# Patient Record
Sex: MALE | Race: WHITE | Employment: OTHER | URBAN - METROPOLITAN AREA
[De-identification: names, ages, dates, MRNs, and addresses within clinical notes are randomized per-mention and may not be internally consistent; named-entity substitution may affect disease eponyms.]

---

## 2017-01-05 DIAGNOSIS — R11.0 NAUSEA: ICD-10-CM

## 2017-01-05 RX ORDER — ONDANSETRON 4 MG/1
4 TABLET, ORALLY DISINTEGRATING ORAL
Qty: 20 TAB | Refills: 0 | Status: SHIPPED | OUTPATIENT
Start: 2017-01-05 | End: 2017-04-17 | Stop reason: SDUPTHER

## 2017-01-10 ENCOUNTER — OFFICE VISIT (OUTPATIENT)
Dept: FAMILY MEDICINE CLINIC | Age: 82
End: 2017-01-10

## 2017-01-10 VITALS
TEMPERATURE: 97.9 F | HEART RATE: 60 BPM | HEIGHT: 64 IN | OXYGEN SATURATION: 99 % | WEIGHT: 155.5 LBS | RESPIRATION RATE: 18 BRPM | DIASTOLIC BLOOD PRESSURE: 92 MMHG | BODY MASS INDEX: 26.55 KG/M2 | SYSTOLIC BLOOD PRESSURE: 147 MMHG

## 2017-01-10 DIAGNOSIS — R11.0 NAUSEA: Primary | ICD-10-CM

## 2017-01-10 DIAGNOSIS — I10 PRIMARY HYPERTENSION: ICD-10-CM

## 2017-01-10 NOTE — PROGRESS NOTES
1. Have you been to the ER, urgent care clinic since your last visit? Hospitalized since your last visit? No    2. Have you seen or consulted any other health care providers outside of the [address] [place] [address] since your last visit? Include any pap smears or colon screening. No   Chief Complaint   Patient presents with    Abdominal Pain     nausea off/on since Dec 2016 - [facility]- X 3 days admission       Nausea--lasts approx 5-10 min several times a day    No vomit, no diarrhea or constipation    Some foods aggravate it, no abd pain, has hx of GB stones and still has GB    Just had neg colonoscopy last month    Chief Complaint   Patient presents with    Abdominal Pain     nausea off/on since Dec 2016 - [facility] 16- X 3 days admission     he is a 80y.o. year old male who presents for evalution. Reviewed PmHx, RxHx, FmHx, SocHx, AllgHx and updated and dated in the chart. Patient Active Problem List    Diagnosis    Insomnia    Primary hypertension    Acquired hypothyroidism    Mixed hyperlipidemia    Coronary atherosclerosis of native coronary artery    Cancer [facility])     prostate         Review of Systems - negative except as listed above in the HPI    Objective:     Vitals:    01/10/17 1018   BP: (!) 147/92   Pulse: 60   Resp: 18   Temp: 97.9 °F (36.6 °C)   TempSrc: Oral   SpO2: 99%   Weight: 155 lb 8 oz (70.5 kg)   Height: 5' 4\" (1.626 m)     Physical Examination: General appearance - alert, well appearing, and in no distress  Neck - supple, no significant adenopathy  Chest - clear to auscultation, no wheezes, rales or rhonchi, symmetric air entry  Heart - normal rate, regular rhythm, normal S1, S2, no murmurs, rubs, clicks or gallops  Abdomen - soft, nontender, nondistended, no masses or organomegaly      Assessment/ Plan:   Arianne Harper was seen today for abdominal pain.     Diagnoses and all orders for this visit:    Nausea  -     US ABD COMP-stat  -suspect gallstones  -refer surgeon if not better  -dwp to avoid fatty foods    Primary hypertension  -at goal for pt age  -sl inc     Follow-up Disposition:  Return if symptoms worsen or fail to improve. I have discussed the diagnosis with the patient and the intended plan as seen in the above orders. The patient understands and agrees with the plan. The patient has received an after-visit summary and questions were answered concerning future plans. Medication Side Effects and Warnings were discussed with patient  Patient Labs were reviewed and or requested:  Patient Past Records were reviewed and or requested    Castro Stark M.D. There are no Patient Instructions on file for this visit.

## 2017-01-10 NOTE — MR AVS SNAPSHOT
Visit Information Date & Time Provider Department Dept. Phone Encounter #  
 1/10/2017 11:10 AM Milton Reinoso MD [facility] [phone] [id number] Follow-up Instructions Return if symptoms worsen or fail to improve. Your Appointments 1/10/2017 11:10 AM  
ESTABLISHED PATIENT with Milton Reinoso MD  
[address] [address] [place] [address]) Appt Note: stomach issues N [address] [place] [id number]  
[phone]  
  
   
 [address] [place] [address]  
  
    
 4/17/2017 11:10 AM  
ESTABLISHED PATIENT with Milton Reinoso MD  
[address] [address] [place] [address]) Appt Note: 6 months follow up  
 [address] [place] [phone]  
[phone] Upcoming Health Maintenance Date Due DTaP/Tdap/Td series (1 - Tdap) 11/18/2006 GLAUCOMA SCREENING Q2Y 4/7/2016 MEDICARE YEARLY EXAM 12/8/2016 COLONOSCOPY 10/6/2021 Allergies as of 1/10/2017  Review Complete On: 1/10/2017 By: Milton Reinoso MD  
  
 Severity Noted Reaction Type Reactions Sulfa (Sulfonamide Antibiotics)  06/16/2010    Unknown (comments) Current Immunizations  Reviewed on 9/26/2016 Name Date Influenza High Dose Vaccine PF 10/17/2016 Influenza Vaccine 8/19/2015, 11/7/2013 Pneumococcal Conjugate (PCV-13) 12/8/2015 Pneumococcal Vaccine (Unspecified Type) 5/19/2005 TD Vaccine 11/17/2006 Zoster 10/1/2009 Zoster Vaccine, Live 8/2/2016 Not reviewed this visit You Were Diagnosed With   
  
 Codes Comments Nausea    -  Primary ICD-10-CM: R11.0 ICD-9-CM: 787.02   
 Primary hypertension     ICD-10-CM: I10 
ICD-9-CM: 401.9 Vitals BP Pulse Temp Resp Height(growth percentile) Weight(growth percentile) (!) 147/92 (BP 1 Location: Left arm, BP Patient Position: Sitting) 60 97.9 °F (36.6 °C) (Oral) 18 5' 4\" (1.626 m) 155 lb 8 oz (70.5 kg) SpO2 BMI Smoking Status 99% 26.69 kg/m2 Former Smoker Vitals History BMI and BSA Data Body Mass Index Body Surface Area  
 26.69 kg/m 2 1.78 m 2 Preferred Pharmacy Pharmacy Name Phone [facility] [address] [place] [address], [facility] [address] [place] [address] [phone] Your Updated Medication List  
  
   
This list is accurate as of: 1/10/17 10:46 AM.  Always use your most recent med list.  
  
  
  
  
 aspirin delayed-release 81 mg tablet Take  by mouth daily. atorvastatin 80 mg tablet Commonly known as:  LIPITOR  
TAKE ONE TABLET BY MOUTH EVERY DAY  
  
 dicyclomine 20 mg tablet Commonly known as:  BENTYL TAKE ONE TABLET BY MOUTH EVERY SIX HOURS  
  
 hydroCHLOROthiazide 25 mg tablet Commonly known as:  HYDRODIURIL Take 1 Tab by mouth daily for 360 days. hyoscyamine SL 0.125 mg SL tablet Commonly known as:  LEVSIN/SL  
1 Tab by SubLINGual route every six (6) hours as needed for Cramping. levothyroxine 112 mcg tablet Commonly known as:  SYNTHROID Take 1 Tab by mouth Daily (before breakfast). loratadine 10 mg tablet Commonly known as:  Michaell Organ Take 1 Tab by mouth daily for 360 days. LORazepam 1 mg tablet Commonly known as:  ATIVAN Take 1 mg by mouth every four (4) hours as needed for Anxiety. metoprolol succinate 200 mg XL tablet Commonly known as:  TOPROL-XL  
TAKE ONE TABLET BY MOUTH EVERY DAY  Indications: Hypertension  
  
 omega-3s-dha-epa-fish oil-D3 360 mg-1,200 mg -1,000 unit Cap Take  by mouth. ondansetron 4 mg disintegrating tablet Commonly known as:  ZOFRAN ODT Take 1 Tab by mouth every eight (8) hours as needed for Nausea. PROBIOTIC 4X 10-15 mg Tbec Generic drug:  B.infantis-B.ani-B.long-B.bifi Take  by mouth. ramipril 10 mg capsule Commonly known as:  ALTACE  
TAKE ONE CAPSULE BY MOUTH EVERY DAY  
  
 triamcinolone 55 mcg nasal inhaler Commonly known as:  NASACORT AQ  
1 [place]lett by Both Nostrils route daily. TYLENOL 325 mg tablet Generic drug:  acetaminophen Take  by mouth every four (4) hours as needed for Pain. Follow-up Instructions Return if symptoms worsen or fail to improve. To-Do List   
 01/10/2017 Imaging:  US ABD COMP [facility] & HEALTH SERVICES! Ariane Cameron introduces [employer] patient portal. Now you can access parts of your medical record, email your doctor's office, and request medication refills online. 1. In your internet browser, go to [email or web address]. [email or web address] 2. Click on the First Time User? Click Here link in the Sign In box. You will see the New Member Sign Up page. 3. Enter your [employer] Access Code exactly as it appears below. You will not need to use this code after youve completed the sign-up process. If you do not sign up before the expiration date, you must request a new code. · [employer] Access Code: [email or web address] Expires: 3/12/2017  2:59 PM 
 
4. Enter the last four digits of your Social Security Number (xxxx) and Date of Birth (mm/dd/yyyy) as indicated and click Submit. You will be taken to the next sign-up page. 5. Create a [employer] ID. This will be your [employer] login ID and cannot be changed, so think of one that is secure and easy to remember. 6. Create a [employer] password. You can change your password at any time. 7. Enter your Password Reset Question and Answer. This can be used at a later time if you forget your password. 8. Enter your e-mail address. You will receive e-mail notification when new information is available in [address] 9. Click Sign Up. You can now view and download portions of your medical record. 10. Click the Download Summary menu link to download a portable copy of your medical information.  
 
If you have questions, please visit the Frequently Asked Questions section of the [employer]. Remember, [employer] is NOT to be used for urgent needs. For medical emergencies, dial 911. Now available from your iPhone and Android! Please provide this summary of care documentation to your next provider. Your primary care clinician is listed as MIREYA PAULINO. If you have any questions after today's visit, please call [phone].

## 2017-01-18 ENCOUNTER — OFFICE VISIT (OUTPATIENT)
Dept: FAMILY MEDICINE CLINIC | Age: 82
End: 2017-01-18

## 2017-01-18 VITALS
DIASTOLIC BLOOD PRESSURE: 78 MMHG | HEART RATE: 56 BPM | WEIGHT: 154 LBS | TEMPERATURE: 98.9 F | BODY MASS INDEX: 26.29 KG/M2 | HEIGHT: 64 IN | SYSTOLIC BLOOD PRESSURE: 160 MMHG | OXYGEN SATURATION: 98 % | RESPIRATION RATE: 22 BRPM

## 2017-01-18 DIAGNOSIS — I10 PRIMARY HYPERTENSION: ICD-10-CM

## 2017-01-18 DIAGNOSIS — Z71.89 ADVANCE CARE PLANNING: ICD-10-CM

## 2017-01-18 DIAGNOSIS — K57.32 DIVERTICULITIS OF LARGE INTESTINE WITHOUT PERFORATION OR ABSCESS WITHOUT BLEEDING: Primary | ICD-10-CM

## 2017-01-18 RX ORDER — CIPROFLOXACIN 500 MG/1
500 TABLET ORAL 2 TIMES DAILY
Qty: 20 TAB | Refills: 0 | Status: SHIPPED | OUTPATIENT
Start: 2017-01-18 | End: 2017-01-28

## 2017-01-18 NOTE — MR AVS SNAPSHOT
Visit Information Date & Time Provider Department Dept. Phone Encounter #  
 1/18/2017  2:00 PM Roosevelt Rosales MD [facility] [phone] [id number] Follow-up Instructions Return if symptoms worsen or fail to improve. Your Appointments 4/17/2017 11:10 AM  
ESTABLISHED PATIENT with Roosevelt Rosales MD  
[address] [address]) Appt Note: 6 months follow up  
 [address] [id number] [phone]  
  
   
 [address] [address] [address] Upcoming Health Maintenance Date Due DTaP/Tdap/Td series (1 - Tdap) 11/18/2006 GLAUCOMA SCREENING Q2Y 4/7/2016 MEDICARE YEARLY EXAM 12/8/2016 COLONOSCOPY 10/6/2021 Allergies as of 1/18/2017  Review Complete On: 1/18/2017 By: Roosevelt Rosales MD  
  
 Severity Noted Reaction Type Reactions Sulfa (Sulfonamide Antibiotics)  06/16/2010    Unknown (comments) Current Immunizations  Reviewed on 9/26/2016 Name Date Influenza High Dose Vaccine PF 10/17/2016 Influenza Vaccine 8/19/2015, 11/7/2013 Pneumococcal Conjugate (PCV-13) 12/8/2015 Pneumococcal Vaccine (Unspecified Type) 5/19/2005 TD Vaccine 11/17/2006 Zoster 10/1/2009 Zoster Vaccine, Live 8/2/2016 Not reviewed this visit You Were Diagnosed With   
  
 Codes Comments Diverticulitis of large intestine without perforation or abscess without bleeding    -  Primary ICD-10-CM: K57.32 
ICD-9-CM: 562.11 Advance care planning     ICD-10-CM: Z71.89 ICD-9-CM: V65.49 Primary hypertension     ICD-10-CM: I10 
ICD-9-CM: 401.9 Vitals BP Pulse Temp Resp Height(growth percentile) Weight(growth percentile) 160/78 (!) 56 98.9 °F (37.2 °C) 22 5' 4\" (1.626 m) 154 lb (69.9 kg) SpO2 BMI Smoking Status 98% 26.43 kg/m2 Former Smoker Vitals History BMI and BSA Data  Body Mass Index Body Surface Area  
 26.43 kg/m 2 1.78 m 2  
  
  
 Preferred Pharmacy Pharmacy Name Phone [facility] [address] [facility],  Jenifer Marinelli [address] [place] [address] [phone] Your Updated Medication List  
  
   
This list is accurate as of: 1/18/17  2:32 PM.  Always use your most recent med list.  
  
  
  
  
 aspirin delayed-release 81 mg tablet Take  by mouth daily. atorvastatin 80 mg tablet Commonly known as:  LIPITOR  
TAKE ONE TABLET BY MOUTH EVERY DAY  
  
 ciprofloxacin HCl 500 mg tablet Commonly known as:  CIPRO Take 1 Tab by mouth two (2) times a day for 10 days. dicyclomine 20 mg tablet Commonly known as:  BENTYL TAKE ONE TABLET BY MOUTH EVERY SIX HOURS  
  
 hydroCHLOROthiazide 25 mg tablet Commonly known as:  HYDRODIURIL Take 1 Tab by mouth daily for 360 days. hyoscyamine SL 0.125 mg SL tablet Commonly known as:  LEVSIN/SL  
1 Tab by SubLINGual route every six (6) hours as needed for Cramping. levothyroxine 112 mcg tablet Commonly known as:  SYNTHROID Take 1 Tab by mouth Daily (before breakfast). loratadine 10 mg tablet Commonly known as:  Alanda Ganong Take 1 Tab by mouth daily for 360 days. LORazepam 1 mg tablet Commonly known as:  ATIVAN Take 1 mg by mouth every four (4) hours as needed for Anxiety. metoprolol succinate 200 mg XL tablet Commonly known as:  TOPROL-XL  
TAKE ONE TABLET BY MOUTH EVERY DAY  Indications: Hypertension  
  
 omega-3s-dha-epa-fish oil-D3 360 mg-1,200 mg -1,000 unit Cap Take  by mouth. ondansetron 4 mg disintegrating tablet Commonly known as:  ZOFRAN ODT Take 1 Tab by mouth every eight (8) hours as needed for Nausea. PROBIOTIC 4X 10-15 mg Tbec Generic drug:  B.infantis-B.ani-B.long-B.bifi Take  by mouth. ramipril 10 mg capsule Commonly known as:  ALTACE  
TAKE ONE CAPSULE BY MOUTH EVERY DAY  
  
 triamcinolone 55 mcg nasal inhaler Commonly known as:  NASACORT AQ  
 1 D Hanis by Both Nostrils route daily. TYLENOL 325 mg tablet Generic drug:  acetaminophen Take  by mouth every four (4) hours as needed for Pain. Prescriptions Sent to Pharmacy Refills  
 ciprofloxacin HCl (CIPRO) 500 mg tablet 0 Sig: Take 1 Tab by mouth two (2) times a day for 10 days. Class: Normal  
 Pharmacy: [facility] [address] [place], [place] [address]y [address] AT [address] [place] [address] #: [phone] Route: Oral  
  
Follow-up Instructions Return if symptoms worsen or fail to improve. Introducing [facility] & HEALTH SERVICES! Abdifatah Hammond introduces [employer] patient portal. Now you can access parts of your medical record, email your doctor's office, and request medication refills online. 1. In your internet browser, go to [email or web address] 2. Click on the First Time User? Click Here link in the Sign In box. You will see the New Member Sign Up page. 3. Enter your [employer] Access Code exactly as it appears below. You will not need to use this code after youve completed the sign-up process. If you do not sign up before the expiration date, you must request a new code. · [employer] Access Code: [email or web address] Expires: 3/12/2017  2:59 PM 
 
4. Enter the last four digits of your Social Security Number (xxxx) and Date of Birth (mm/dd/yyyy) as indicated and click Submit. You will be taken to the next sign-up page. 5. Create a [employer] ID. This will be your [employer] login ID and cannot be changed, so think of one that is secure and easy to remember. 6. Create a [employer] password. You can change your password at any time. 7. Enter your Password Reset Question and Answer. This can be used at a later time if you forget your password. 8. Enter your e-mail address. You will receive e-mail notification when new information is available in [address] 9. Click Sign Up. You can now view and download portions of your medical record. 10. Click the Download Summary menu link to download a portable copy of your medical information. If you have questions, please visit the Frequently Asked Questions section of the [employer] website. Remember, [employer] is NOT to be used for urgent needs. For medical emergencies, dial 911. Now available from your iPhone and Android! Please provide this summary of care documentation to your next provider. Your primary care clinician is listed as MIREYA PAULINO. If you have any questions after today's visit, please call [phone].

## 2017-01-18 NOTE — PROGRESS NOTES
Chief Complaint   Patient presents with    Abdominal Pain     left groin pain     1. Have you been to the ER, urgent care clinic since your last visit? Hospitalized since your last visit? No    2. Have you seen or consulted any other health care providers outside of the [address] [place] [address] since your last visit? Include any pap smears or colon screening. No       Due for med cpx    Chief Complaint   Patient presents with    Abdominal Pain     left groin pain     he is a 80y.o. year old male who presents for evaluation for their Medicare Wellness Visit. Georgette Sables is completed and assessed=yes  Depression Screen is completed and assessed=yes  Medication list reviewed and adjusted for accuracy=yes  Immunizations reviewed and updated=yes  Health/Preventative Screenings reviewed and updated=yes  ADL Functions reviewed=yes    Patient Active Problem List    Diagnosis    Advance care planning     Has a Lw      Insomnia    Primary hypertension    Acquired hypothyroidism    Mixed hyperlipidemia    Coronary atherosclerosis of native coronary artery    Cancer [facility])     prostate         Reviewed PmHx, RxHx, FmHx, SocHx, AllgHx and updated and dated in the chart. Review of Systems - negative except as listed above in the HPI    Objective:     Vitals:    01/18/17 1420   BP: 160/78   Pulse: (!) 56   Resp: 22   Temp: 98.9 °F (37.2 °C)   SpO2: 98%   Weight: 154 lb (69.9 kg)   Height: 5' 4\" (1.626 m)     Physical Examination: General appearance - alert, well appearing, and in no distress  Chest - clear to auscultation, no wheezes, rales or rhonchi, symmetric air entry  Heart - normal rate, regular rhythm, normal S1, S2, no murmurs, rubs, clicks or gallops  Abdomen - tenderness noted LLQ    Assessment/ Plan:   Nathan Shin was seen today for abdominal pain.     Diagnoses and all orders for this visit:    Diverticulitis of large intestine without perforation or abscess without bleeding  -     ciprofloxacin HCl (CIPRO) 500 mg tablet; Take 1 Tab by mouth two (2) times a day for 10 days. Advance care planning  -has LW    Primary hypertension  -ok with illness         -Pain evaluation performed in office  -Cognitive Screen performed in office  -Depression Screen, Fall risks and ADL functionality were addressed  -Medication list updated and reviewed for any changes   -End of life planning was addressed with pt   -Health Screenings for preventions were addressed  -Shingles Vaccine was recommended  -Discussed with patient cancer risk factors   -Patient evaluated for colonoscopy and referred if needed per screeing criteria  -Labs from previous visits were discussed with patient   -Discussed with patient diet and exercise  -Follow-up Disposition:  Return if symptoms worsen or fail to improve. I have discussed the diagnosis with the patient and the intended plan as seen in the above orders. The patient understands and agrees with the plan. The patient has received an after-visit summary and questions were answered concerning future plans. Medication Side Effects and Warnings were discussed with patien  Patient Labs were reviewed and or requested  Patient Past Records were reviewed and or requested    There are no Patient Instructions on file for this visit.       Jeffrey Herring M.D.

## 2017-02-20 ENCOUNTER — OFFICE VISIT (OUTPATIENT)
Dept: FAMILY MEDICINE CLINIC | Age: 82
End: 2017-02-20

## 2017-02-20 VITALS
HEIGHT: 64 IN | DIASTOLIC BLOOD PRESSURE: 88 MMHG | HEART RATE: 69 BPM | TEMPERATURE: 97.6 F | OXYGEN SATURATION: 99 % | BODY MASS INDEX: 26.46 KG/M2 | WEIGHT: 155 LBS | SYSTOLIC BLOOD PRESSURE: 149 MMHG

## 2017-02-20 DIAGNOSIS — I10 PRIMARY HYPERTENSION: Primary | ICD-10-CM

## 2017-02-20 DIAGNOSIS — J06.9 ACUTE URI: ICD-10-CM

## 2017-02-20 RX ORDER — AZITHROMYCIN 250 MG/1
TABLET, FILM COATED ORAL
Qty: 6 TAB | Refills: 0 | Status: SHIPPED | OUTPATIENT
Start: 2017-02-20 | End: 2017-04-17 | Stop reason: ALTCHOICE

## 2017-02-20 RX ORDER — LISINOPRIL 20 MG/1
20 TABLET ORAL DAILY
Qty: 30 TAB | Refills: 1 | Status: SHIPPED | OUTPATIENT
Start: 2017-02-20 | End: 2017-04-24 | Stop reason: SDUPTHER

## 2017-02-20 NOTE — MR AVS SNAPSHOT
Visit Information Date & Time Provider Department Dept. Phone Encounter #  
 2/20/2017  8:45 AM Alexey Tam NP [address] [facility] [phone] [id number] Follow-up Instructions Return if symptoms worsen or fail to improve. Your Appointments 4/17/2017 11:10 AM  
ESTABLISHED PATIENT with Chantale Kumari MD  
[address] [facility] [address]) Appt Note: 6 months follow up  
 [address] [address]  
[phone]  
  
   
 [id number] [place] [address] [address] [address] Upcoming Health Maintenance Date Due DTaP/Tdap/Td series (1 - Tdap) 11/18/2006 GLAUCOMA SCREENING Q2Y 4/7/2016 MEDICARE YEARLY EXAM 1/19/2018 COLONOSCOPY 10/6/2021 Allergies as of 2/20/2017  Review Complete On: 2/20/2017 By: Alexey Tam NP Severity Noted Reaction Type Reactions Sulfa (Sulfonamide Antibiotics)  06/16/2010    Unknown (comments) Current Immunizations  Reviewed on 9/26/2016 Name Date Influenza High Dose Vaccine PF 10/17/2016 Influenza Vaccine 8/19/2015, 11/7/2013 Pneumococcal Conjugate (PCV-13) 12/8/2015 Pneumococcal Vaccine (Unspecified Type) 5/19/2005 TD Vaccine 11/17/2006 Zoster 10/1/2009 Zoster Vaccine, Live 8/2/2016 Not reviewed this visit You Were Diagnosed With   
  
 Codes Comments Primary hypertension    -  Primary ICD-10-CM: I10 
ICD-9-CM: 401.9 Acute URI     ICD-10-CM: J06.9 ICD-9-CM: 465.9 Vitals BP Pulse Temp Height(growth percentile) Weight(growth percentile) SpO2  
 149/88 (BP 1 Location: Left arm, BP Patient Position: Sitting) 69 97.6 °F (36.4 °C) (Oral) 5' 4\" (1.626 m) 155 lb (70.3 kg) 99% BMI Smoking Status 26.61 kg/m2 Former Smoker Vitals History BMI and BSA Data Body Mass Index Body Surface Area  
 26.61 kg/m 2 1.78 m 2 Preferred Pharmacy Pharmacy Name Phone [facility] [address] [place] [address],  Jenifer Kelley [address] [phone] Your Updated Medication List  
  
   
This list is accurate as of: 2/20/17  9:01 AM.  Always use your most recent med list.  
  
  
  
  
 aspirin delayed-release 81 mg tablet Take  by mouth daily. atorvastatin 80 mg tablet Commonly known as:  LIPITOR  
TAKE ONE TABLET BY MOUTH EVERY DAY  
  
 azithromycin 250 mg tablet Commonly known as:  Albarran Congress Take 2 tabs po today then 1 tab po x 4 days  
  
 dicyclomine 20 mg tablet Commonly known as:  BENTYL TAKE ONE TABLET BY MOUTH EVERY SIX HOURS  
  
 hydroCHLOROthiazide 25 mg tablet Commonly known as:  HYDRODIURIL Take 1 Tab by mouth daily for 360 days. hyoscyamine SL 0.125 mg SL tablet Commonly known as:  LEVSIN/SL  
1 Tab by SubLINGual route every six (6) hours as needed for Cramping. levothyroxine 112 mcg tablet Commonly known as:  SYNTHROID  
TAKE 1 TABLET BY MOUTH DAILY BEFORE BREAKFAST  
  
 lisinopril 20 mg tablet Commonly known as:  Jessica Underwood Take 1 Tab by mouth daily. loratadine 10 mg tablet Commonly known as:  Shellye Drape Take 1 Tab by mouth daily for 360 days. LORazepam 1 mg tablet Commonly known as:  ATIVAN Take 1 mg by mouth every four (4) hours as needed for Anxiety. metoprolol succinate 200 mg XL tablet Commonly known as:  TOPROL-XL  
TAKE ONE TABLET BY MOUTH EVERY DAY  Indications: Hypertension  
  
 omega-3s-dha-epa-fish oil-D3 360 mg-1,200 mg -1,000 unit Cap Take  by mouth. ondansetron 4 mg disintegrating tablet Commonly known as:  ZOFRAN ODT Take 1 Tab by mouth every eight (8) hours as needed for Nausea. PROBIOTIC 4X 10-15 mg Tbec Generic drug:  B.infantis-B.ani-B.long-B.bifi Take  by mouth.  
  
 triamcinolone 55 mcg nasal inhaler Commonly known as:  NASACORT AQ  
1 [place]hertz by Both Nostrils route daily. TYLENOL 325 mg tablet Generic drug:  acetaminophen Take  by mouth every four (4) hours as needed for Pain. Prescriptions Sent to Pharmacy Refills  
 lisinopril (PRINIVIL, ZESTRIL) 20 mg tablet 1 Sig: Take 1 Tab by mouth daily. Class: Normal  
 [address] [facility] [address] Ph #: [phone] Route: Oral  
 azithromycin (ZITHROMAX) 250 mg tablet 0 Sig: Take 2 tabs po today then 1 tab po x 4 days Class: Normal  
 Pharmacy[address] Ph #: [phone] Follow-up Instructions Return if symptoms worsen or fail to improve. Patient Instructions Sinusitis: Care Instructions Your Care Instructions Sinusitis is an infection of the lining of the sinus cavities in your head. Sinusitis often follows a cold. It causes pain and pressure in your head and face. In most cases, sinusitis gets better on its own in 1 to 2 weeks. But some mild symptoms may last for several weeks. Sometimes antibiotics are needed. Follow-up care is a key part of your treatment and safety. Be sure to make and go to all appointments, and call your doctor if you are having problems. It's also a good idea to know your test results and keep a list of the medicines you take. How can you care for yourself at home? · Take an over-the-counter pain medicine, such as acetaminophen (Tylenol), ibuprofen (Advil, Motrin), or naproxen (Aleve). Read and follow all instructions on the label. · If the doctor prescribed antibiotics, take them as directed. Do not stop taking them just because you feel better. You need to take the full course of antibiotics. · Be careful when taking over-the-counter cold or flu medicines and Tylenol at the same time. Many of these medicines have acetaminophen, which is Tylenol.  Read the labels to make sure that you are not taking more than the recommended dose. Too much acetaminophen (Tylenol) can be harmful. · Breathe warm, moist air from a steamy shower, a hot bath, or a sink filled with hot water. Avoid cold, dry air. Using a humidifier in your home may help. Follow the directions for cleaning the machine. · Use saline (saltwater) nasal washes to help keep your nasal passages open and wash out mucus and bacteria. You can buy saline nose drops at a grocery store or drugstore. Or you can make your own at home by adding 1 teaspoon of salt and 1 teaspoon of baking soda to 2 cups of distilled water. If you make your own, fill a bulb syringe with the solution, insert the tip into your nostril, and squeeze gently. Jose Shasta your nose. · Put a hot, wet towel or a warm gel pack on your face 3 or 4 times a day for 5 to 10 minutes each time. · Try a decongestant nasal spray like oxymetazoline (Afrin). Do not use it for more than 3 days in a row. Using it for more than 3 days can make your congestion worse. When should you call for help? Call your doctor now or seek immediate medical care if: 
· You have new or worse swelling or redness in your face or around your eyes. · You have a new or higher fever. Watch closely for changes in your health, and be sure to contact your doctor if: 
· You have new or worse facial pain. · The mucus from your nose becomes thicker (like pus) or has new blood in it. · You are not getting better as expected. Where can you learn more? Go to http://camille-joe.info/. Enter L231 in the search box to learn more about \"Sinusitis: Care Instructions. \" Current as of: July 29, 2016 Content Version: 11.1 © 6323-8501 New.[employer]. Care instructions adapted under license by [employer] (which disclaims liability or warranty for this information).  If you have questions about a medical condition or this instruction, always ask your healthcare professional. [employer] disclaims any warranty or liability for your use of this information. Introducing hospitals & HEALTH SERVICES! Carson Stephens introduces [employer] patient portal. Now you can access parts of your medical record, email your doctor's office, and request medication refills online. 1. In your internet browser, go to [email or web address] 2. Click on the First Time User? Click Here link in the Sign In box. You will see the New Member Sign Up page. 3. Enter your [employer] Access Code exactly as it appears below. You will not need to use this code after youve completed the sign-up process. If you do not sign up before the expiration date, you must request a new code. · [employer] Access Code: [email or web address] Expires: 3/12/2017  2:59 PM 
 
4. Enter the last four digits of your Social Security Number (xxxx) and Date of Birth (mm/dd/yyyy) as indicated and click Submit. You will be taken to the next sign-up page. 5. Create a [employer]iwin[employer] ID. This will be your [employer]win[employer] login ID and cannot be changed, so think of one that is secure and easy to remember. 6. Create a [employer]win[employer] password. You can change your password at any time. 7. Enter your Password Reset Question and Answer. This can be used at a later time if you forget your password. 8. Enter your e-mail address. You will receive e-mail notification when new information is available in [address] 9. Click Sign Up. You can now view and download portions of your medical record. 10. Click the Download Summary menu link to download a portable copy of your medical information. If you have questions, please visit the Frequently Asked Questions section of the [employer] website. Remember, [employer] is NOT to be used for urgent needs. For medical emergencies, dial 911. Now available from your iPhone and Android! Please provide this summary of care documentation to your next provider. Your primary care clinician is listed as MIREYA PAULINO. If you have any questions after today's visit, please call [phone].

## 2017-02-20 NOTE — PROGRESS NOTES
Chief Complaint   Patient presents with    Pressure Behind the Eyes     x 1 week    Nasal Discharge    Cough     more of clearing of throat     he is a 80y.o. year old male who presents for evalution. Started having lot of sinus pain and pressure for past week. Has been taking Tylenol and using nasal spray but not helping. Course is worsening. Has had sick contacts. Pt states has been worried about HTN. Recently Metoprolol was increased but pt states blood pressure still running high at home - checks mid- morning and mid-afternoon. Ranging between 150-180s/70s-80s. No chest pain, SOB, dizziness or vision changes. Reviewed PmHx, RxHx, FmHx, SocHx, AllgHx and updated and dated in the chart. Review of Systems - negative except as listed above in the HPI    Objective:     Vitals:    02/20/17 0842   BP: 149/88   Pulse: 69   Temp: 97.6 °F (36.4 °C)   TempSrc: Oral   SpO2: 99%   Weight: 155 lb (70.3 kg)   Height: 5' 4\" (1.626 m)     Physical Examination: General appearance - alert, well appearing, and in no distress  Ears - bilateral TM's and external ear canals normal  Nose - mucosal congestion, mucosal erythema and sinus tenderness noted maxillary   Mouth - mucous membranes moist, pharynx normal without lesions and erythematous  Neck - supple, no significant adenopathy  Chest - clear to auscultation, no wheezes, rales or rhonchi, symmetric air entry  Heart - normal rate, regular rhythm, normal S1, S2, no murmurs, rubs, clicks or gallops    Assessment/ Plan:   Nathan Shin was seen today for pressure behind the eyes, nasal discharge and cough. Diagnoses and all orders for this visit:    Primary hypertension  -     lisinopril (PRINIVIL, ZESTRIL) 20 mg tablet; Take 1 Tab by mouth daily. New rx. Encouraged pt to monitor BP at home, preferably in AM when first wakes up. Goal BP is < 130/90 if on meds.   Discussed consequences of uncontrolled HTN and need for yearly eye exam. Recommended pt limit salt intake and engage in regular exercise. Continue current medications. F/U 3 mo or sooner if needed    Acute URI  -     azithromycin (ZITHROMAX) 250 mg tablet; Take 2 tabs po today then 1 tab po x 4 days  New rx. Discussed and encouraged rest and clear fluids, if congestion is thick should avoid dairy. Recommended hot showers with steam and elevating head of bed. May use Vitamin C or Echinacea in addition to current therapy. Pt voiced understanding regarding plan of care. Follow-up Disposition:  Return if symptoms worsen or fail to improve. I have discussed the diagnosis with the patient and the intended plan as seen in the above orders. The patient has received an after-visit summary and questions were answered concerning future plans.      Medication Side Effects and Warnings were discussed with patient    Dinesh Evans NP

## 2017-02-20 NOTE — PATIENT INSTRUCTIONS
Sinusitis: Care Instructions  Your Care Instructions    Sinusitis is an infection of the lining of the sinus cavities in your head. Sinusitis often follows a cold. It causes pain and pressure in your head and face. In most cases, sinusitis gets better on its own in 1 to 2 weeks. But some mild symptoms may last for several weeks. Sometimes antibiotics are needed. Follow-up care is a key part of your treatment and safety. Be sure to make and go to all appointments, and call your doctor if you are having problems. It's also a good idea to know your test results and keep a list of the medicines you take. How can you care for yourself at home? · Take an over-the-counter pain medicine, such as acetaminophen (Tylenol), ibuprofen (Advil, Motrin), or naproxen (Aleve). Read and follow all instructions on the label. · If the doctor prescribed antibiotics, take them as directed. Do not stop taking them just because you feel better. You need to take the full course of antibiotics. · Be careful when taking over-the-counter cold or flu medicines and Tylenol at the same time. Many of these medicines have acetaminophen, which is Tylenol. Read the labels to make sure that you are not taking more than the recommended dose. Too much acetaminophen (Tylenol) can be harmful. · Breathe warm, moist air from a steamy shower, a hot bath, or a sink filled with hot water. Avoid cold, dry air. Using a humidifier in your home may help. Follow the directions for cleaning the machine. · Use saline (saltwater) nasal washes to help keep your nasal passages open and wash out mucus and bacteria. You can buy saline nose drops at a grocery store or drugstore. Or you can make your own at home by adding 1 teaspoon of salt and 1 teaspoon of baking soda to 2 cups of distilled water. If you make your own, fill a bulb syringe with the solution, insert the tip into your nostril, and squeeze gently. Sidonie Cheryl your nose.   · Put a hot, wet towel or a warm gel pack on your face 3 or 4 times a day for 5 to 10 minutes each time. · Try a decongestant nasal spray like oxymetazoline (Afrin). Do not use it for more than 3 days in a row. Using it for more than 3 days can make your congestion worse. When should you call for help? Call your doctor now or seek immediate medical care if:  · You have new or worse swelling or redness in your face or around your eyes. · You have a new or higher fever. Watch closely for changes in your health, and be sure to contact your doctor if:  · You have new or worse facial pain. · The mucus from your nose becomes thicker (like pus) or has new blood in it. · You are not getting better as expected. Where can you learn more? Go to http://camille-joe.info/. Enter G084 in the search box to learn more about \"Sinusitis: Care Instructions. \"  Current as of: July 29, 2016  Content Version: 11.1  © [id number] [employer]. Care instructions adapted under license by RIIS[employer] (which disclaims liability or warranty for this information). If you have questions about a medical condition or this instruction, always ask your healthcare professional. [place] [address] any warranty or liability for your use of this information.

## 2017-02-27 RX ORDER — DICYCLOMINE HYDROCHLORIDE 20 MG/1
TABLET ORAL
Qty: 270 TAB | Refills: 0 | Status: SHIPPED | OUTPATIENT
Start: 2017-02-27 | End: 2017-08-21 | Stop reason: SDUPTHER

## 2017-03-05 DIAGNOSIS — I10 ESSENTIAL HYPERTENSION WITH GOAL BLOOD PRESSURE LESS THAN 140/90: ICD-10-CM

## 2017-03-05 DIAGNOSIS — I10 HTN (HYPERTENSION), MALIGNANT: ICD-10-CM

## 2017-03-05 RX ORDER — METOPROLOL SUCCINATE 200 MG/1
TABLET, EXTENDED RELEASE ORAL
Qty: 30 TAB | Refills: 0 | Status: SHIPPED | OUTPATIENT
Start: 2017-03-05 | End: 2017-07-20 | Stop reason: ALTCHOICE

## 2017-04-17 ENCOUNTER — HOSPITAL ENCOUNTER (OUTPATIENT)
Dept: LAB | Age: 82
Discharge: HOME | End: 2017-04-17
Payer: MEDICARE

## 2017-04-17 ENCOUNTER — OFFICE VISIT (OUTPATIENT)
Dept: FAMILY MEDICINE CLINIC | Age: 82
End: 2017-04-17

## 2017-04-17 VITALS
SYSTOLIC BLOOD PRESSURE: 164 MMHG | WEIGHT: 143.5 LBS | RESPIRATION RATE: 16 BRPM | HEART RATE: 74 BPM | OXYGEN SATURATION: 97 % | TEMPERATURE: 97.6 F | HEIGHT: 64 IN | DIASTOLIC BLOOD PRESSURE: 79 MMHG | BODY MASS INDEX: 24.5 KG/M2

## 2017-04-17 DIAGNOSIS — R11.0 NAUSEA: ICD-10-CM

## 2017-04-17 DIAGNOSIS — E03.9 ACQUIRED HYPOTHYROIDISM: ICD-10-CM

## 2017-04-17 DIAGNOSIS — I10 PRIMARY HYPERTENSION: Primary | ICD-10-CM

## 2017-04-17 DIAGNOSIS — E78.2 MIXED HYPERLIPIDEMIA: ICD-10-CM

## 2017-04-17 PROCEDURE — 84443 ASSAY THYROID STIM HORMONE: CPT

## 2017-04-17 PROCEDURE — 80053 COMPREHEN METABOLIC PANEL: CPT

## 2017-04-17 PROCEDURE — 80061 LIPID PANEL: CPT

## 2017-04-17 RX ORDER — ONDANSETRON 4 MG/1
4 TABLET, ORALLY DISINTEGRATING ORAL
Qty: 20 TAB | Refills: 0 | Status: SHIPPED | OUTPATIENT
Start: 2017-04-17 | End: 2017-06-27 | Stop reason: SDUPTHER

## 2017-04-17 NOTE — PROGRESS NOTES
Chief Complaint   Patient presents with    Labs    Diverticulitis    Medication Refill     1. Have you been to the ER, urgent care clinic since your last visit? Hospitalized since your last visit? No    2. Have you seen or consulted any other health care providers outside of the [address] [place] [address] since your last visit? Include any pap smears or colon screening. No        SOAP NOTE:    Chief Complaint   Patient presents with    Labs    Diverticulitis    Medication Refill     he is a 80y.o. year old male who presents for evalution. Reviewed PmHx, RxHx, FmHx, SocHx, AllgHx and updated and dated in the chart. Patient Active Problem List    Diagnosis    Advance care planning     Has a Lw      Insomnia    Primary hypertension    Acquired hypothyroidism    Mixed hyperlipidemia    Coronary atherosclerosis of native coronary artery    Cancer [facility])     prostate         Review of Systems - negative except as listed above in the HPI    Objective:     Vitals:    04/17/17 1158   BP: 164/79   Pulse: 74   Resp: 16   Temp: 97.6 °F (36.4 °C)   TempSrc: Oral   SpO2: 97%   Weight: 143 lb 8 oz (65.1 kg)   Height: 5' 4\" (1.626 m)     Physical Examination: General appearance - alert, well appearing, and in no distress  Neck - supple, no significant adenopathy  Chest - clear to auscultation, no wheezes, rales or rhonchi, symmetric air entry  Heart - normal rate, regular rhythm, normal S1, S2, no murmurs, rubs, clicks or gallops  Abdomen - soft, nontender, nondistended, no masses or organomegaly      Assessment/ Plan:   Violette Cotto was seen today for labs, diverticulitis and medication refill.     Diagnoses and all orders for this visit:    Primary hypertension  -     LIPID PANEL  -     METABOLIC PANEL, COMPREHENSIVE  -inc but no rx today and better at home per pt    Acquired hypothyroidism  -     TSH 3RD GENERATION    Mixed hyperlipidemia  -     LIPID PANEL  -     METABOLIC PANEL, COMPREHENSIVE    Nausea  - ondansetron (ZOFRAN ODT) 4 mg disintegrating tablet; Take 1 Tab by mouth every eight (8) hours as needed for Nausea. Follow-up Disposition:  Return in about 6 months (around 10/17/2017), or if symptoms worsen or fail to improve. I have discussed the diagnosis with the patient and the intended plan as seen in the above orders. The patient understands and agrees with the plan. The patient has received an after-visit summary and questions were answered concerning future plans. Medication Side Effects and Warnings were discussed with patient  Patient Labs were reviewed and or requested:  Patient Past Records were reviewed and or requested    Jose Murray M.D. There are no Patient Instructions on file for this visit.

## 2017-04-17 NOTE — MR AVS SNAPSHOT
Visit Information Date & Time Provider Department Dept. Phone Encounter #  
 4/17/2017 11:10 AM Yohana Avalos MD [address] [facility] [phone] [id number] Follow-up Instructions Return in about 6 months (around 10/17/2017), or if symptoms worsen or fail to improve. Upcoming Health Maintenance Date Due DTaP/Tdap/Td series (1 - Tdap) 11/18/2006 GLAUCOMA SCREENING Q2Y 4/7/2016 MEDICARE YEARLY EXAM 1/19/2018 COLONOSCOPY 10/6/2021 Allergies as of 4/17/2017  Review Complete On: 4/17/2017 By: Yhoana Avalos MD  
  
 Severity Noted Reaction Type Reactions Sulfa (Sulfonamide Antibiotics)  06/16/2010    Unknown (comments) Current Immunizations  Reviewed on 9/26/2016 Name Date Influenza High Dose Vaccine PF 10/17/2016 Influenza Vaccine 8/19/2015, 11/7/2013 Pneumococcal Conjugate (PCV-13) 12/8/2015 Pneumococcal Vaccine (Unspecified Type) 5/19/2005 TD Vaccine 11/17/2006 Zoster 10/1/2009 Zoster Vaccine, Live 8/2/2016 Not reviewed this visit You Were Diagnosed With   
  
 Codes Comments Primary hypertension    -  Primary ICD-10-CM: I10 
ICD-9-CM: 401.9 Acquired hypothyroidism     ICD-10-CM: E03.9 ICD-9-CM: 244.9 Mixed hyperlipidemia     ICD-10-CM: E78.2 ICD-9-CM: 272.2 Nausea     ICD-10-CM: R11.0 ICD-9-CM: 787.02 Vitals BP Pulse Temp Resp Height(growth percentile) Weight(growth percentile) 164/79 74 97.6 °F (36.4 °C) (Oral) 16 5' 4\" (1.626 m) 143 lb 8 oz (65.1 kg) SpO2 BMI Smoking Status 97% 24.63 kg/m2 Former Smoker Vitals History BMI and BSA Data Body Mass Index Body Surface Area  
 24.63 kg/m 2 1.71 m 2 Preferred Pharmacy Pharmacy Name Phone [facility] [address] [place] [address], [address] [place] [address] [phone] Your Updated Medication List  
  
   
 This list is accurate as of: 4/17/17 12:10 PM.  Always use your most recent med list.  
  
  
  
  
 aspirin delayed-release 81 mg tablet Take  by mouth daily. atorvastatin 80 mg tablet Commonly known as:  LIPITOR  
TAKE ONE TABLET BY MOUTH EVERY DAY  
  
 dicyclomine 20 mg tablet Commonly known as:  BENTYL TAKE ONE TABLET BY MOUTH EVERY SIX HOURS  
  
 hydroCHLOROthiazide 25 mg tablet Commonly known as:  HYDRODIURIL Take 1 Tab by mouth daily for 360 days. hyoscyamine SL 0.125 mg SL tablet Commonly known as:  LEVSIN/SL  
1 Tab by SubLINGual route every six (6) hours as needed for Cramping. levothyroxine 112 mcg tablet Commonly known as:  SYNTHROID  
TAKE 1 TABLET BY MOUTH DAILY BEFORE BREAKFAST  
  
 lisinopril 20 mg tablet Commonly known as:  Lauryn Aneta Take 1 Tab by mouth daily. loratadine 10 mg tablet Commonly known as:  Alben Jobs Take 1 Tab by mouth daily for 360 days. LORazepam 1 mg tablet Commonly known as:  ATIVAN Take 1 mg by mouth every four (4) hours as needed for Anxiety. metoprolol succinate 200 mg XL tablet Commonly known as:  TOPROL-XL  
TAKE 1 TABLET BY MOUTH EVERY DAY FOR HIGH BLOOD PRESSURE  
  
 omega-3s-dha-epa-fish oil-D3 360 mg-1,200 mg -1,000 unit Cap Take  by mouth. ondansetron 4 mg disintegrating tablet Commonly known as:  ZOFRAN ODT Take 1 Tab by mouth every eight (8) hours as needed for Nausea. PROBIOTIC 4X 10-15 mg Tbec Generic drug:  B.infantis-B.ani-B.long-B.bifi Take  by mouth.  
  
 triamcinolone 55 mcg nasal inhaler Commonly known as:  NASACORT AQ  
1 [place] by Both Nostrils route daily. TYLENOL 325 mg tablet Generic drug:  acetaminophen Take  by mouth every four (4) hours as needed for Pain. Prescriptions Sent to Pharmacy Refills  
 ondansetron (ZOFRAN ODT) 4 mg disintegrating tablet 0 Sig: Take 1 Tab by mouth every eight (8) hours as needed for Nausea. Class: Normal  
 Pharmacy: [facility] [address] [place] [address] [address] [place] [address] #: [phone] Route: Oral  
  
We Performed the Following LIPID PANEL [56058 CPT(R)] METABOLIC PANEL, COMPREHENSIVE [20264 CPT(R)] TSH 3RD GENERATION [72995 CPT(R)] Follow-up Instructions Return in about 6 months (around 10/17/2017), or if symptoms worsen or fail to improve. Introducing [facility]! [employer] introduces [employer] patient portal. Now you can access parts of your medical record, email your doctor's office, and request medication refills online. 1. In your internet browser, go to [email or web address] 2. Click on the First Time User? Click Here link in the Sign In box. You will see the New Member Sign Up page. 3. Enter your [employer] Access Code exactly as it appears below. You will not need to use this code after youve completed the sign-up process. If you do not sign up before the expiration date, you must request a new code. · [employer] Access Code: [email or web address] Expires: 7/16/2017 12:09 PM 
 
4. Enter the last four digits of your Social Security Number (xxxx) and Date of Birth (mm/dd/yyyy) as indicated and click Submit. You will be taken to the next sign-up page. 5. Create a [employer] ID. This will be your [employer] login ID and cannot be changed, so think of one that is secure and easy to remember. 6. Create a [employer] password. You can change your password at any time. 7. Enter your Password Reset Question and Answer. This can be used at a later time if you forget your password. 8. Enter your e-mail address. You will receive e-mail notification when new information is available in [address] 9. Click Sign Up. You can now view and download portions of your medical record. 10. Click the Download Summary menu link to download a portable copy of your medical information. If you have questions, please visit the Frequently Asked Questions section of the [employer] website. Remember, [employer] is NOT to be used for urgent needs. For medical emergencies, dial 911. Now available from your iPhone and Android! Please provide this summary of care documentation to your next provider. Your primary care clinician is listed as MIREYA PAULINO. If you have any questions after today's visit, please call [phone].

## 2017-04-17 NOTE — LETTER
4/19/2017 5:00 PM 
 
[place]. [place] [facility] [place] [address] [place] [phone] Dear Rogers Hi: 
 
Please find your most recent results below. Resulted Orders LIPID PANEL Result Value Ref Range Cholesterol, total 160 100 - 199 mg/dL Triglyceride 225 (H) 0 - 149 mg/dL HDL Cholesterol 35 (L) >39 mg/dL VLDL, calculated 45 (H) 5 - 40 mg/dL LDL, calculated 80 0 - 99 mg/dL Narrative Performed at:  [address]  [id number] : Donte Rowan MD, Phone:  [phone] METABOLIC PANEL, COMPREHENSIVE Result Value Ref Range Glucose 92 65 - 99 mg/dL BUN 18 8 - 27 mg/dL Creatinine 0.90 0.76 - 1.27 mg/dL GFR est non-AA 77 >59 mL/min/1.73 GFR est AA 89 >59 mL/min/1.73  
 BUN/Creatinine ratio 20 10 - 24 Sodium 136 134 - 144 mmol/L Potassium 4.9 3.5 - 5.2 mmol/L Chloride 96 96 - 106 mmol/L  
 CO2 20 18 - 29 mmol/L Calcium 9.7 8.6 - 10.2 mg/dL Protein, total 7.1 6.0 - 8.5 g/dL Albumin 4.3 3.5 - 4.7 g/dL GLOBULIN, TOTAL 2.8 1.5 - 4.5 g/dL A-G Ratio 1.5 1.2 - 2.2 Bilirubin, total 0.4 0.0 - 1.2 mg/dL Alk. phosphatase 77 39 - 117 IU/L  
 AST (SGOT) 19 0 - 40 IU/L  
 ALT (SGPT) 15 0 - 44 IU/L Narrative Performed at:  [address]  [id number] : Donte Rowan MD, Phone:  [phone] TSH 3RD GENERATION Result Value Ref Range TSH 0.874 0.450 - 4.500 uIU/mL Narrative Performed at:  [address]  [id number] : Donte Rowan MD, Phone:  [phone] CVD REPORT Result Value Ref Range INTERPRETATION Note Comment:  
   Supplement report is available. Narrative Performed at:  [address] [place] [address]  [id number] : Rickie Hutchinson PhD, Phone:  [phone] RECOMMENDATIONS: 
 After reviewing your medical history and your lab results, they appear at goal for you!    
 
Let us make 2017 a great year! Dr. Kamla Asher M.D. Good Help to those in Need! !!  
    
   
 
 
 
Please call me if you have any questions: [phone] Sincerely, Yohana Avalos MD

## 2017-04-19 LAB
ALBUMIN SERPL-MCNC: 4.3 G/DL (ref 3.5–4.7)
ALBUMIN/GLOB SERPL: 1.5 {RATIO} (ref 1.2–2.2)
ALP SERPL-CCNC: 77 IU/L (ref 39–117)
ALT SERPL-CCNC: 15 IU/L (ref 0–44)
AST SERPL-CCNC: 19 IU/L (ref 0–40)
BILIRUB SERPL-MCNC: 0.4 MG/DL (ref 0–1.2)
BUN SERPL-MCNC: 18 MG/DL (ref 8–27)
BUN/CREAT SERPL: 20 (ref 10–24)
CALCIUM SERPL-MCNC: 9.7 MG/DL (ref 8.6–10.2)
CHLORIDE SERPL-SCNC: 96 MMOL/L (ref 96–106)
CHOLEST SERPL-MCNC: 160 MG/DL (ref 100–199)
CO2 SERPL-SCNC: 20 MMOL/L (ref 18–29)
CREAT SERPL-MCNC: 0.9 MG/DL (ref 0.76–1.27)
GLOBULIN SER CALC-MCNC: 2.8 G/DL (ref 1.5–4.5)
GLUCOSE SERPL-MCNC: 92 MG/DL (ref 65–99)
HDLC SERPL-MCNC: 35 MG/DL
INTERPRETATION, 910389: NORMAL
LDLC SERPL CALC-MCNC: 80 MG/DL (ref 0–99)
POTASSIUM SERPL-SCNC: 4.9 MMOL/L (ref 3.5–5.2)
PROT SERPL-MCNC: 7.1 G/DL (ref 6–8.5)
SODIUM SERPL-SCNC: 136 MMOL/L (ref 134–144)
TRIGL SERPL-MCNC: 225 MG/DL (ref 0–149)
TSH SERPL DL<=0.005 MIU/L-ACNC: 0.87 UIU/ML (ref 0.45–4.5)
VLDLC SERPL CALC-MCNC: 45 MG/DL (ref 5–40)

## 2017-04-19 NOTE — PROGRESS NOTES
A letter was sent, to the address on file, with lab results and Dr. Jeff Leon recommendations for the pt.

## 2017-04-19 NOTE — PROGRESS NOTES
After reviewing your medical history and your lab results, they appear at goal for you! Let us make 2017 a great year! Dr. Brad Patton M.D.       Good Help to those in Need!!!

## 2017-04-24 DIAGNOSIS — I10 PRIMARY HYPERTENSION: ICD-10-CM

## 2017-04-24 RX ORDER — LORAZEPAM 1 MG/1
TABLET ORAL
Qty: 90 TAB | Refills: 2 | OUTPATIENT
Start: 2017-04-24 | End: 2017-10-12 | Stop reason: SDUPTHER

## 2017-04-24 RX ORDER — LISINOPRIL 20 MG/1
TABLET ORAL
Qty: 30 TAB | Refills: 0 | Status: SHIPPED | OUTPATIENT
Start: 2017-04-24 | End: 2017-05-22 | Stop reason: SDUPTHER

## 2017-05-22 DIAGNOSIS — I10 PRIMARY HYPERTENSION: ICD-10-CM

## 2017-05-22 RX ORDER — LISINOPRIL 20 MG/1
TABLET ORAL
Qty: 30 TAB | Refills: 3 | Status: SHIPPED | OUTPATIENT
Start: 2017-05-22 | End: 2017-09-18 | Stop reason: SDUPTHER

## 2017-05-31 DIAGNOSIS — E03.9 ACQUIRED HYPOTHYROIDISM: ICD-10-CM

## 2017-05-31 RX ORDER — LEVOTHYROXINE SODIUM 112 UG/1
TABLET ORAL
Qty: 90 TAB | Refills: 0 | Status: SHIPPED | OUTPATIENT
Start: 2017-05-31 | End: 2017-08-27 | Stop reason: SDUPTHER

## 2017-06-27 DIAGNOSIS — R11.0 NAUSEA: ICD-10-CM

## 2017-06-28 RX ORDER — ONDANSETRON 4 MG/1
4 TABLET, ORALLY DISINTEGRATING ORAL
Qty: 20 TAB | Refills: 0 | Status: SHIPPED | OUTPATIENT
Start: 2017-06-28 | End: 2017-07-17 | Stop reason: SDUPTHER

## 2017-07-10 ENCOUNTER — PATIENT OUTREACH (OUTPATIENT)
Dept: FAMILY MEDICINE CLINIC | Age: 82
End: 2017-07-10

## 2017-07-10 RX ORDER — LEVOFLOXACIN 500 MG/1
TABLET, FILM COATED ORAL DAILY
COMMUNITY
End: 2017-07-20 | Stop reason: ALTCHOICE

## 2017-07-10 RX ORDER — METRONIDAZOLE 500 MG/1
TABLET ORAL 3 TIMES DAILY
COMMUNITY
End: 2017-07-20 | Stop reason: ALTCHOICE

## 2017-07-10 NOTE — PROGRESS NOTES
7/10/17, This writer/NN received message, [facility] hospitalization. 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 Patient also Hyponatremic(Na at 124, resolved with IV Fluids) and Leukocytic(WBC at 20,000, resolved). Printed H&P, Consult Report and Discharge Summary for Dr Katie Sawyer to review. Per Discharge Summary: Continue Levaquin and Flagyl for 5 days, increase fiber intake, F/U with PCP and GI/Dr Blade Mensah. Per chart review, keeps F/U appt, seen recently by Dr Katie Sawyer, 4/17/17. Had Annual Wellness, 1/18/17. Past medical history includes: CAD/CABG x in 2001, Hyperlipidemia, HTN, Hypothyroidism and Prostate Cancer. Of note, wife passed away, June 2016.  7/10/17, This writer/NN contacted patient at listed phone number, HIPAA verified with 2 identifiers. Patient allowed this writer/NN to explain purpose of call, post hospitalization. Patient verbalizes understanding, agrees to allow this writer/NN to assist with scheduling F/U appt, scheduled for 7/12/17 at 2:20pm with Dr Katie Sawyer, patient verbalizes understanding and plans to attend as scheduled. Patient participated in medication reconciliation, added Levaquin and Flagyl to current list, provided instruction on importance to complete course of antibiotics as ordered, patient verbalizes understanding and agrees to take as ordered. Patient denies need/not taking, Bentyl, Claritin, NasaCort, Levsin and states Lisinopril is only current blood pressure medication, no HCTZ or Metoprolol at this time. Patient states pain has improved, denies diarrhea or confusion at this time. Instruction provided on importance to continue to monitor symptoms(reviewed symptoms of dehydration), monitor pain, notify MD of changes or concerns and seek 911/medical attention as needed.  Patient verbalizes understanding, states Daughter available to assist as needed, patient continues to live alone at this time. Patient denies need for [facility] assistance at this time. Patient agrees to allow this writer/NN to continue to follow. Patient denies further questions or concerns at this time. PLAN:  Continue transitions of care, discuss further symptoms or concerns, ?complete antibiotics, ? GI F/U appt, ?pain management, ?diet/increase fiber and recent blood pressure readings. Provide instruction, goal work and resource connection as indicated. See Previous NN Documentation:  10/7/16, This writer/NN out of office, received message, PASSPORT REPORT, with alert of [facility]/Hospitalization. 10/11/16, This writer/NN and [facility] access, able to confirm patient had Colonoscopy 10/5/16(2 polyps removed) and required hospitalization, post procedure, 10/6/16 related to abdominal pain, CT scan shows Colitis, seen by GI, repeat Colonoscopy shows internal hemorrhoids and stigmata of bleeding at splenic flexure secondary to previous polypectomy. Recommended to keep holding Aspirin for at least one week and F/U with PCP. Printed H&P, Discharge Summary and Colonoscopy Report for Dr Kemi Zepeda to review. Per chart review, patient keeps F/U appts with Dr Kemi Zepeda, seen recently, 9/26/16. RISK SCORE = 10, Moderate Risk for Re-Admission.

## 2017-07-11 ENCOUNTER — PATIENT OUTREACH (OUTPATIENT)
Dept: FAMILY MEDICINE CLINIC | Age: 82
End: 2017-07-11

## 2017-07-11 NOTE — Clinical Note
Dr Alexis Ayala, Hopefully he will make it to appt tomorrow, 7/12----met with Dr Alexis Ayala to discuss reported symptoms/continued diarrhea. Dr Alexis Ayala recommends to continue antibiotics and push po fluid(Pedialyte/Gatorade), keep scheduled appt 7/12/17, and should symptoms worsen, seek 911/medical attention as needed. This writer/NN agrees to notify patient. 7/11/17, This writer/NN returned call to patient to discuss above recommendations per Dr Alexis Ayala. Patient verbalizes understanding, denies dizziness or falls, denies blood in stools, states will try to increase fluid intake and get some gatorade/pedialyte. Patient agrees to call as needed and seek medical attention as needed.  Thank you!!  Ashley Brito

## 2017-07-11 NOTE — PROGRESS NOTES
7/11/17, This writer/NN met with Dr Dimitry Salgado to discuss reported symptoms/continued diarrhea. Dr Dimitry Salgado recommends to continue antibiotics and push po fluid(Pedialyte/Gatorade), keep scheduled appt 7/12/17, and should symptoms worsen, seek 911/medical attention as needed. This writer/NN agrees to notify patient. 7/11/17, This writer/NN returned call to patient to discuss above recommendations per Dr Dimitry Salgado. Patient verbalizes understanding, denies dizziness or falls, denies blood in stools, states will try to increase fluid intake and get some gatorade/pedialyte. Patient agrees to call as needed and seek medical attention as needed. Patient denies further questions or concerns at this time. 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  7/11/17, This writer/NN received return call from patient, HIPAA verified with 2 identifiers. Patient calling to report return of diarrhea, asking to notify Dr Dimitry Salgado for further recommendations until appt tomorrow, 7/12/17. 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 Patient also Hyponatremic(Na at 124, resolved with IV Fluids) and Leukocytic(WBC at 20,000, resolved). Printed H&P, Consult Report and Discharge Summary for Dr Dimitry Salgado to review. Per Discharge Summary: Continue Levaquin and Flagyl for 5 days, increase fiber intake, F/U with PCP and GI/Dr Chip Jimenez. Per chart review, keeps F/U appt, seen recently by Dr Dimitry Salgado, 4/17/17. Had Annual Wellness, 1/18/17. Past medical history includes: CAD/CABG x in 2001, Hyperlipidemia, HTN, Hypothyroidism and Prostate Cancer. Of note, wife passed away, June 2016.  7/10/17, This writer/NN contacted patient at listed phone number, HIPAA verified with 2 identifiers. Patient allowed this writer/NN to explain purpose of call, post hospitalization. Patient verbalizes understanding, agrees to allow this writer/NN to assist with scheduling F/U appt, scheduled for 7/12/17 at 2:20pm with Dr Eileen De La Cruz, patient verbalizes understanding and plans to attend as scheduled. Patient participated in medication reconciliation, added Levaquin and Flagyl to current list, provided instruction on importance to complete course of antibiotics as ordered, patient verbalizes understanding and agrees to take as ordered. Patient denies need/not taking, Bentyl, Claritin, NasaCort, Levsin and states Lisinopril is only current blood pressure medication, no HCTZ or Metoprolol at this time.  Patient states pain has improved, denies diarrhea or confusion at this time. Instruction provided on importance to continue to monitor symptoms(reviewed symptoms of dehydration), monitor pain, notify MD of changes or concerns and seek 911/medical attention as needed. Patient verbalizes understanding, states Daughter available to assist as needed, patient continues to live alone at this time. Patient denies need for [facility] assistance at this time. Patient agrees to allow this writer/NN to continue to follow. Patient denies further questions or concerns at this time. PLAN:  Continue transitions of care, discuss further symptoms or concerns, ?complete antibiotics, ? GI F/U appt, ?pain management, ?diet/increase fiber and recent blood pressure readings. Provide instruction, goal work and resource connection as indicated. See Previous NN Documentation:  10/7/16, This writer/NN out of office, received message, PASSPORT REPORT, with alert of [facility]/Hospitalization. 10/11/16, This writer/NN and [facility] access, able to confirm patient had Colonoscopy 10/5/16(2 polyps removed) and required hospitalization, post procedure, 10/6/16 related to abdominal pain, CT scan shows Colitis, seen by GI, repeat Colonoscopy shows internal hemorrhoids and stigmata of bleeding at splenic flexure secondary to previous polypectomy. Recommended to keep holding Aspirin for at least one week and F/U with PCP. Printed H&P, Discharge Summary and Colonoscopy Report for Dr Markie Stephenson to review. Per chart review, patient keeps F/U appts with Dr Markei Stephenson, seen recently, 9/26/16. RISK SCORE = 10, Moderate Risk for Re-Admission.

## 2017-07-11 NOTE — PROGRESS NOTES
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  7/11/17, This writer/NN received return call from patient, HIPAA verified with 2 identifiers. Patient calling to report return of diarrhea, asking to notify Dr Stanton Rubi for further recommendations until appt tomorrow, 7/12/17. 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 Patient also Hyponatremic(Na at 124, resolved with IV Fluids) and Leukocytic(WBC at 20,000, resolved). Printed H&P, Consult Report and Discharge Summary for Dr Stanton Rubi to review. Per Discharge Summary: Continue Levaquin and Flagyl for 5 days, increase fiber intake, F/U with PCP and GI/Dr Jarvis Cantu. Per chart review, keeps F/U appt, seen recently by Dr Stanton Rubi, 4/17/17. Had Annual Wellness, 1/18/17. Past medical history includes: CAD/CABG x in 2001, Hyperlipidemia, HTN, Hypothyroidism and Prostate Cancer. Of note, wife passed away, June 2016.  7/10/17, This writer/NN contacted patient at listed phone number, HIPAA verified with 2 identifiers. Patient allowed this writer/NN to explain purpose of call, post hospitalization. Patient verbalizes understanding, agrees to allow this writer/NN to assist with scheduling F/U appt, scheduled for 7/12/17 at 2:20pm with Dr Stanton Rubi, patient verbalizes understanding and plans to attend as scheduled. Patient participated in medication reconciliation, added Levaquin and Flagyl to current list, provided instruction on importance to complete course of antibiotics as ordered, patient verbalizes understanding and agrees to take as ordered. Patient denies need/not taking, Bentyl, Claritin, NasaCort, Levsin and states Lisinopril is only current blood pressure medication, no HCTZ or Metoprolol at this time. Patient states pain has improved, denies diarrhea or confusion at this time.  Instruction provided on importance to continue to monitor symptoms(reviewed symptoms of dehydration), monitor pain, notify MD of changes or concerns and seek 911/medical attention as needed. Patient verbalizes understanding, states Daughter available to assist as needed, patient continues to live alone at this time. Patient denies need for [facility] assistance at this time. Patient agrees to allow this writer/NN to continue to follow. Patient denies further questions or concerns at this time. PLAN:  Continue transitions of care, discuss further symptoms or concerns, ?complete antibiotics, ? GI F/U appt, ?pain management, ?diet/increase fiber and recent blood pressure readings. Provide instruction, goal work and resource connection as indicated. See Previous NN Documentation:  10/7/16, This writer/NN out of office, received message, PASSPORT REPORT, with alert of [facility]/Hospitalization. 10/11/16, This writer/NN and [facility] access, able to confirm patient had Colonoscopy 10/5/16(2 polyps removed) and required hospitalization, post procedure, 10/6/16 related to abdominal pain, CT scan shows Colitis, seen by GI, repeat Colonoscopy shows internal hemorrhoids and stigmata of bleeding at splenic flexure secondary to previous polypectomy. Recommended to keep holding Aspirin for at least one week and F/U with PCP. Printed H&P, Discharge Summary and Colonoscopy Report for Dr Myra Rock to review. Per chart review, patient keeps F/U appts with Dr Myra Rock, seen recently, 9/26/16. RISK SCORE = 10, Moderate Risk for Re-Admission.

## 2017-07-11 NOTE — Clinical Note
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 7/11/17, This writer/NN received return call from patient, HIPAA verified with 2 identifiers. Patient calling to report return of diarrhea, asking to notify Dr Kemi Zepeda for further recommendations until appt tomorrow, 7/12/17. See Previous NN/Patient Outreach Documentation: Patient also Hyponatremic(Na at 124, resolved with IV Fluids) and Leukocytic(WBC at 20,000, resolved). Printed H&P, Consult Report and Discharge Summary for Dr Kemi Zepeda to review. Per Discharge Summary: Continue Levaquin and Flagyl for 5 days, increase fiber intake, F/U with PCP and GI/Dr Rik Arthur. 7/10/17, This writer/NN contacted patient,agrees to allow this writer/NN to assist with scheduling F/U appt, scheduled for 7/12/17 at 2:20pm with Dr Kemi Zepeda. Let me know your recommendations.  Thanks, Nohemy Naidu

## 2017-07-12 ENCOUNTER — OFFICE VISIT (OUTPATIENT)
Dept: FAMILY MEDICINE CLINIC | Age: 82
End: 2017-07-12

## 2017-07-12 ENCOUNTER — PATIENT OUTREACH (OUTPATIENT)
Dept: FAMILY MEDICINE CLINIC | Age: 82
End: 2017-07-12

## 2017-07-12 VITALS
WEIGHT: 156 LBS | OXYGEN SATURATION: 96 % | DIASTOLIC BLOOD PRESSURE: 70 MMHG | RESPIRATION RATE: 20 BRPM | SYSTOLIC BLOOD PRESSURE: 135 MMHG | TEMPERATURE: 98.4 F | HEIGHT: 64 IN | BODY MASS INDEX: 26.63 KG/M2 | HEART RATE: 100 BPM

## 2017-07-12 DIAGNOSIS — I10 PRIMARY HYPERTENSION: ICD-10-CM

## 2017-07-12 DIAGNOSIS — K55.9 ISCHEMIC COLITIS (HCC): Primary | ICD-10-CM

## 2017-07-12 NOTE — MR AVS SNAPSHOT
Visit Information Date & Time Provider Department Dept. Phone Encounter #  
 7/12/2017  2:20 PM Rosie Amin MD [address] [facility] [phone] [id number] Follow-up Instructions Return if symptoms worsen or fail to improve. Upcoming Health Maintenance Date Due DTaP/Tdap/Td series (1 - Tdap) 11/18/2006 INFLUENZA AGE 9 TO ADULT 8/1/2017 MEDICARE YEARLY EXAM 1/19/2018 GLAUCOMA SCREENING Q2Y 4/17/2019 COLONOSCOPY 10/6/2021 Allergies as of 7/12/2017  Review Complete On: 7/12/2017 By: Rosie Amin MD  
  
 Severity Noted Reaction Type Reactions Sulfa (Sulfonamide Antibiotics)  06/16/2010    Unknown (comments) Current Immunizations  Reviewed on 7/12/2017 Name Date Influenza High Dose Vaccine PF 10/17/2016 Influenza Vaccine 8/19/2015, 11/7/2013 Pneumococcal Conjugate (PCV-13) 12/8/2015 Pneumococcal Vaccine (Unspecified Type) 5/19/2005 TD Vaccine 11/17/2006 Zoster 10/1/2009 Zoster Vaccine, Live 8/2/2016 Reviewed by Rosie Amin MD on 7/12/2017 at  3:04 PM  
You Were Diagnosed With   
  
 Codes Comments [facility]schemic [facility] ([facility]    -  Primary ICD-10-CM: K55.9 ICD-9-CM: 557.9 Primary hypertension     ICD-10-CM: I10 
ICD-9-CM: 401.9 Vitals BP Pulse Temp Resp Height(growth percentile) Weight(growth percentile) 135/70 100 98.4 °F (36.9 °C) 20 5' 4\" (1.626 m) 156 lb (70.8 kg) SpO2 BMI Smoking Status 96% 26.78 kg/m2 Former Smoker Vitals History BMI and BSA Data Body Mass Index Body Surface Area  
 26.78 kg/m 2 1.79 m 2 Preferred Pharmacy Pharmacy Name Phone [facility] [address] [facility], [address] [place] [address] [phone] Your Updated Medication List  
  
   
This list is accurate as of: 7/12/17  3:06 PM.  Always use your most recent med list.  
  
  
  
  
 aspirin delayed-release 81 mg tablet Take  by mouth daily. atorvastatin 80 mg tablet Commonly known as:  LIPITOR  
TAKE ONE TABLET BY MOUTH EVERY DAY  
  
 dicyclomine 20 mg tablet Commonly known as:  BENTYL TAKE ONE TABLET BY MOUTH EVERY SIX HOURS FLAGYL 500 mg tablet Generic drug:  metroNIDAZOLE Take  by mouth three (3) times daily. hydroCHLOROthiazide 25 mg tablet Commonly known as:  HYDRODIURIL Take 1 Tab by mouth daily for 360 days. hyoscyamine SL 0.125 mg SL tablet Commonly known as:  LEVSIN/SL  
1 Tab by SubLINGual route every six (6) hours as needed for Cramping. LEVAQUIN 500 mg tablet Generic drug:  levoFLOXacin Take  by mouth daily. levothyroxine 112 mcg tablet Commonly known as:  SYNTHROID  
TAKE 1 TABLET BY MOUTH DAILY BEFORE BREAKFAST  
  
 lisinopril 20 mg tablet Commonly known as:  PRINIVIL, ZESTRIL  
TAKE 1 TABLET BY MOUTH DAILY  
  
 loratadine 10 mg tablet Commonly known as:  Mari Buddle Take 1 Tab by mouth daily for 360 days. LORazepam 1 mg tablet Commonly known as:  ATIVAN  
TAKE 1 TABLET BY MOUTH EVERY 8 HOURS AS NEEDED  
  
 metoprolol succinate 200 mg XL tablet Commonly known as:  TOPROL-XL  
TAKE 1 TABLET BY MOUTH EVERY DAY FOR HIGH BLOOD PRESSURE  
  
 omega-3s-dha-epa-fish oil-D3 360 mg-1,200 mg -1,000 unit Cap Take  by mouth. ondansetron 4 mg disintegrating tablet Commonly known as:  ZOFRAN ODT Take 1 Tab by mouth every eight (8) hours as needed for Nausea. PROBIOTIC 4X 10-15 mg Tbec Generic drug:  B.infantis-B.ani-B.long-B.bifi Take  by mouth.  
  
 triamcinolone 55 mcg nasal inhaler Commonly known as:  NASACORT AQ  
1 [place] by Both Nostrils route daily. TYLENOL 325 mg tablet Generic drug:  acetaminophen Take  by mouth every four (4) hours as needed for Pain. Follow-up Instructions Return if symptoms worsen or fail to improve. Introducing [address]! Jenny Reardon introduces [employer] patient portal. Now you can access parts of your medical record, email your doctor's office, and request medication refills online. 1. In your internet browser, go to [email or web address] 2. Click on the First Time User? Click Here link in the Sign In box. You will see the New Member Sign Up page. 3. Enter your [employer] Access Code exactly as it appears below. You will not need to use this code after youve completed the sign-up process. If you do not sign up before the expiration date, you must request a new code. · [employer] Access Code: [email or web address] Expires: 7/16/2017 12:09 PM 
 
4. Enter the last four digits of your Social Security Number (xxxx) and Date of Birth (mm/dd/yyyy) as indicated and click Submit. You will be taken to the next sign-up page. 5. Create a [employer] ID. This will be your [employer] login ID and cannot be changed, so think of one that is secure and easy to remember. 6. Create a [employer] password. You can change your password at any time. 7. Enter your Password Reset Question and Answer. This can be used at a later time if you forget your password. 8. Enter your e-mail address. You will receive e-mail notification when new information is available in [address] 9. Click Sign Up. You can now view and download portions of your medical record. 10. Click the Download Summary menu link to download a portable copy of your medical information. If you have questions, please visit the Frequently Asked Questions section of the [employer] website. Remember, [employer] is NOT to be used for urgent needs. For medical emergencies, dial 911. Now available from your iPhone and Android! Please provide this summary of care documentation to your next provider. Your primary care clinician is listed as MIREYA PAULINO. If you have any questions after today's visit, please call [phone].

## 2017-07-12 NOTE — PROGRESS NOTES
7/12/17, Met with patient during office visit with Dr Fabiola Soares. Patient states feeling better, diarrhea seems to have ended at this time, agrees to complete full course of antibiotics as ordered, states continues to hold down fluids and solid food at this time. Patient states Pneumonia vaccine was given at [facility], this writer/NN agrees to access [facility] to confirm and report to Dr Fabiola Soares. Allowed patient time to complete visit with Dr Fabiola Soares. Patient expresses appreciation for care and agrees to allow this writer to continue to follow. Patient denies further questions or concerns at this time. 7/12/17, This writer/NN and [facility] access, able to confirm immunization, printed for Dr Fabiola Soares to review. See Previous NN/Patient Outreach Documentation:  7/11/17, This writer/NN met with Dr Fabiola Soares to discuss reported symptoms/continued diarrhea. Dr Fabiola Soares recommends to continue antibiotics and push po fluid(Pedialyte/Gatorade), keep scheduled appt 7/12/17, and should symptoms worsen, seek 911/medical attention as needed. This writer/NN agrees to notify patient. 7/11/17, This writer/NN returned call to patient to discuss above recommendations per Dr Fabiola Soares. Patient verbalizes understanding, denies dizziness or falls, denies blood in stools, states will try to increase fluid intake and get some gatorade/pedialyte. Patient agrees to call as needed and seek medical attention as needed. Patient denies further questions or concerns at this time. See Previous NN/Patient Outreach Documentation:  Patient S/P 7/6/17 - 7/8/17 at [facility], related to abdominal pain/ischemis colitis, scheduled to see Dr Fabiola Soares, 7/12/17.  7/11/17, This writer/NN received return call from patient, HIPAA verified with 2 identifiers. Patient calling to report return of diarrhea, asking to notify Dr Fabiola Soares for further recommendations until appt tomorrow, 7/12/17.   See Previous NN/Patient Outreach Documentation:  7/6/17 - 7/8/17 at [facility], related to abdominal pain/ischemis colitis. Patient also Hyponatremic(Na at 124, resolved with IV Fluids) and Leukocytic(WBC at 20,000, resolved). Printed H&P, Consult Report and Discharge Summary for Dr Milena Self to review. Per Discharge Summary: Continue Levaquin and Flagyl for 5 days, increase fiber intake, F/U with PCP and GI/Dr Arley Gallegos. Per chart review, keeps F/U appt, seen recently by Dr Milena Self, 4/17/17. Had Annual [facility]ness, 1/18/17. Past medical history includes: CAD/CABG x in 2001, Hyperlipidemia, HTN, Hypothyroidism and Prostate Cancer. Of note, wife passed away, June 2016.  7/10/17, This writer/NN contacted patient at listed phone number, HIPAA verified with 2 identifiers. Patient allowed this writer/NN to explain purpose of call, post hospitalization. Patient verbalizes understanding, agrees to allow this writer/NN to assist with scheduling F/U appt, scheduled for 7/12/17 at 2:20pm with Dr Milena Self, patient verbalizes understanding and plans to attend as scheduled. Patient participated in medication reconciliation, added Levaquin and Flagyl to current list, provided instruction on importance to complete course of antibiotics as ordered, patient verbalizes understanding and agrees to take as ordered. Patient denies need/not taking, Bentyl, Claritin, NasaCort, Levsin and states Lisinopril is only current blood pressure medication, no HCTZ or Metoprolol at this time.  Patient states pain has improved, denies diarrhea or confusion at this time. Instruction provided on importance to continue to monitor symptoms(reviewed symptoms of dehydration), monitor pain, notify MD of changes or concerns and seek 911/medical attention as needed. Patient verbalizes understanding, states Daughter available to assist as needed, patient continues to live alone at this time. [facility] at this time. Patient agrees to allow this writer/NN to continue to follow. Patient denies further questions or concerns at this time.   PLAN:  Continue transitions of care, discuss further symptoms or concerns, ?complete antibiotics, ? GI F/U appt, ?pain management, ?diet/increase fiber and recent blood pressure readings. Provide instruction, goal work and resource connection as indicated. See Previous NN Documentation:  10/7/16, This writer/NN out of office, received message, PASSPORT REPORT, with alert of [facility]/Hospitalization. 10/11/16, This writer/NN and [facility] access, able to confirm patient had Colonoscopy 10/5/16(2 polyps removed) and required hospitalization, post procedure, 10/6/16 related to abdominal pain, CT scan shows Colitis, seen by GI, repeat Colonoscopy shows internal hemorrhoids and stigmata of bleeding at splenic flexure secondary to previous polypectomy. Recommended to keep holding Aspirin for at least one week and F/U with PCP. Printed H&P, Discharge Summary and Colonoscopy Report for Dr Neil Jolly to review. Per chart review, patient keeps F/U appts with Dr Neil Jolly, seen recently, 9/26/16. RISK SCORE = 10, Moderate Risk for Re-Admission.

## 2017-07-12 NOTE — PROGRESS NOTES
Patient here for hosp f/u 7/6/2017-7/8/2017 [facility] abd pain, ischemic colitis. 1. Have you been to the ER, urgent care clinic since your last visit? Hospitalized since your last visit? Yes When: see notes    2. Have you seen or consulted any other health care providers outside of the [address] [place] [address] since your last visit? Include any pap smears or colon screening. No     All sxs resolved    See NN note from [facility] admit      Chief Complaint   Patient presents with   [facility] Follow Up     [facility] abd pain, ischemic colitis. Still taking flagyl and levaquin. he is a 80y.o. year old male who presents for evalution. Reviewed PmHx, RxHx, FmHx, SocHx, AllgHx and updated and dated in the chart. Patient Active Problem List    Diagnosis    Ischemic colitis ([facility]    Advance care planning     Has a Lw      Insomnia    Primary hypertension    Acquired hypothyroidism    Mixed hyperlipidemia    Coronary atherosclerosis of native coronary artery    Cancer [facility])     prostate         Review of Systems - negative except as listed above in the HPI    Objective:     Vitals:    07/12/17 1451   BP: 135/70   Pulse: 100   Resp: 20   Temp: 98.4 °F (36.9 °C)   SpO2: 96%   Weight: 156 lb (70.8 kg)   Height: 5' 4\" (1.626 m)     Physical Examination: General appearance - alert, well appearing, and in no distress  Chest - clear to auscultation, no wheezes, rales or rhonchi, symmetric air entry  Heart - normal rate, regular rhythm, normal S1, S2, no murmurs, rubs, clicks or gallops  Abdomen - soft, nontender, nondistended, no masses or organomegaly    Assessment/ Plan:   Palak Reyna was seen today for hospital follow up. Diagnoses and all orders for this visit:    Ischemic colitis ([facility]  -doing better  -comp rx  -see NN note  -nn met with pt as well today    Primary hypertension  -at goal       Follow-up Disposition:  Return if symptoms worsen or fail to improve.     I have discussed the diagnosis with the patient and the intended plan as seen in the above orders. The patient understands and agrees with the plan. The patient has received an after-visit summary and questions were answered concerning future plans. Medication Side Effects and Warnings were discussed with patient  Patient Labs were reviewed and or requested:  Patient Past Records were reviewed and or requested    Heidy Smith M.D. There are no Patient Instructions on file for this visit.

## 2017-07-17 DIAGNOSIS — R11.0 NAUSEA: ICD-10-CM

## 2017-07-17 RX ORDER — ONDANSETRON 4 MG/1
4 TABLET, ORALLY DISINTEGRATING ORAL
Qty: 20 TAB | Refills: 0 | Status: SHIPPED | OUTPATIENT
Start: 2017-07-17 | End: 2017-07-20 | Stop reason: SDUPTHER

## 2017-07-20 ENCOUNTER — OFFICE VISIT (OUTPATIENT)
Dept: FAMILY MEDICINE CLINIC | Age: 82
End: 2017-07-20

## 2017-07-20 VITALS
RESPIRATION RATE: 20 BRPM | OXYGEN SATURATION: 98 % | TEMPERATURE: 97.7 F | HEIGHT: 64 IN | WEIGHT: 152 LBS | SYSTOLIC BLOOD PRESSURE: 155 MMHG | DIASTOLIC BLOOD PRESSURE: 80 MMHG | BODY MASS INDEX: 25.95 KG/M2 | HEART RATE: 69 BPM

## 2017-07-20 DIAGNOSIS — R11.0 NAUSEA: Primary | ICD-10-CM

## 2017-07-20 DIAGNOSIS — F32.A DEPRESSION, UNSPECIFIED DEPRESSION TYPE: ICD-10-CM

## 2017-07-20 DIAGNOSIS — I10 PRIMARY HYPERTENSION: ICD-10-CM

## 2017-07-20 RX ORDER — CITALOPRAM 10 MG/1
10 TABLET ORAL DAILY
Qty: 30 TAB | Refills: 1 | Status: SHIPPED | OUTPATIENT
Start: 2017-07-20 | End: 2017-08-22 | Stop reason: SDUPTHER

## 2017-07-20 RX ORDER — ONDANSETRON 4 MG/1
4 TABLET, ORALLY DISINTEGRATING ORAL
Qty: 20 TAB | Refills: 0 | Status: SHIPPED | OUTPATIENT
Start: 2017-07-20 | End: 2017-10-17

## 2017-07-20 RX ORDER — HYDROCHLOROTHIAZIDE 25 MG/1
25 TABLET ORAL DAILY
Qty: 30 TAB | Refills: 11 | Status: SHIPPED | OUTPATIENT
Start: 2017-07-20 | End: 2017-11-30

## 2017-07-20 NOTE — MR AVS SNAPSHOT
Visit Information Date & Time Provider Department Dept. Phone Encounter #  
 7/20/2017  2:40 PM Michelle Portillo MD [address] [facility] [phone] [id number] Follow-up Instructions Return in about 1 month (around 8/20/2017). Upcoming Health Maintenance Date Due DTaP/Tdap/Td series (1 - Tdap) 11/18/2006 INFLUENZA AGE 9 TO ADULT 8/1/2017 MEDICARE YEARLY EXAM 1/19/2018 GLAUCOMA SCREENING Q2Y 4/17/2019 COLONOSCOPY 10/6/2021 Allergies as of 7/20/2017  Review Complete On: 7/20/2017 By: Michelle Portillo MD  
  
 Severity Noted Reaction Type Reactions Sulfa (Sulfonamide Antibiotics)  06/16/2010    Unknown (comments) Current Immunizations  Reviewed on 7/12/2017 Name Date Influenza High Dose Vaccine PF 10/17/2016 Influenza Vaccine 8/19/2015, 11/7/2013 Pneumococcal Conjugate (PCV-13) 7/8/2017, 12/8/2015 Pneumococcal Vaccine (Unspecified Type) 5/19/2005 TD Vaccine 11/17/2006 Zoster 10/1/2009 Zoster Vaccine, Live 8/2/2016 Not reviewed this visit You Were Diagnosed With   
  
 Codes Comments Nausea    -  Primary ICD-10-CM: R11.0 ICD-9-CM: 787.02   
 Primary hypertension     ICD-10-CM: I10 
ICD-9-CM: 401.9 Depression, unspecified depression type     ICD-10-CM: F32.9 ICD-9-CM: 979 Vitals BP Pulse Temp Resp Height(growth percentile) Weight(growth percentile) 155/80 69 97.7 °F (36.5 °C) 20 5' 4\" (1.626 m) 152 lb (68.9 kg) SpO2 BMI Smoking Status 98% 26.09 kg/m2 Former Smoker Vitals History BMI and BSA Data Body Mass Index Body Surface Area 26.09 kg/m 2 1.76 m 2 Preferred Pharmacy Pharmacy Name Phone [facility]ORE [address] [place] [address], [address] [place] [address] Drive [phone] Your Updated Medication List  
  
   
This list is accurate as of: 7/20/17  3:40 PM.  Always use your most recent med list.  
  
  
  
  
 aspirin delayed-release 81 mg tablet Take  by mouth daily. atorvastatin 80 mg tablet Commonly known as:  LIPITOR  
TAKE ONE TABLET BY MOUTH EVERY DAY  
  
 citalopram 10 mg tablet Commonly known as:  Michael Sanchez Take 1 Tab by mouth daily. Indications: ANXIETY WITH DEPRESSION  
  
 dicyclomine 20 mg tablet Commonly known as:  BENTYL TAKE ONE TABLET BY MOUTH EVERY SIX HOURS  
  
 hydroCHLOROthiazide 25 mg tablet Commonly known as:  HYDRODIURIL Take 1 Tab by mouth daily for 360 days. hyoscyamine SL 0.125 mg SL tablet Commonly known as:  LEVSIN/SL  
1 Tab by SubLINGual route every six (6) hours as needed for Cramping. levothyroxine 112 mcg tablet Commonly known as:  SYNTHROID  
TAKE 1 TABLET BY MOUTH DAILY BEFORE BREAKFAST  
  
 lisinopril 20 mg tablet Commonly known as:  PRINIVIL, ZESTRIL  
TAKE 1 TABLET BY MOUTH DAILY  
  
 loratadine 10 mg tablet Commonly known as:  Kip Manual Take 1 Tab by mouth daily for 360 days. LORazepam 1 mg tablet Commonly known as:  ATIVAN  
TAKE 1 TABLET BY MOUTH EVERY 8 HOURS AS NEEDED  
  
 omega-3s-dha-epa-fish oil-D3 360 mg-1,200 mg -1,000 unit Cap Take  by mouth. ondansetron 4 mg disintegrating tablet Commonly known as:  ZOFRAN ODT Take 1 Tab by mouth every eight (8) hours as needed for Nausea. PROBIOTIC 4X 10-15 mg Tbec Generic drug:  B.infantis-B.ani-B.long-B.bifi Take  by mouth. TYLENOL 325 mg tablet Generic drug:  acetaminophen Take  by mouth every four (4) hours as needed for Pain. Prescriptions Sent to Pharmacy Refills  
 ondansetron (ZOFRAN ODT) 4 mg disintegrating tablet 0 Sig: Take 1 Tab by mouth every eight (8) hours as needed for Nausea. Class: Normal  
 Pharmacy: [facility] [address] [place], [place] [address]y [address]  #: [phone]  Route: Oral  
 citalopram (CELEXA) 10 mg tablet 1  
 Sig: Take 1 Tab by mouth daily. Indications: ANXIETY WITH DEPRESSION Class: Normal  
 Pharmacy: [facility] [address] [place], [place] [address]y [address] [facility] #: [phone] Route: Oral  
  
Follow-up Instructions Return in about 1 month (around 8/20/2017). Patient Instructions I have started you on an anti-depressant combo anti-anxiety medication today. The most common side effect that you can experience is nausea during the first week but this will subside. If for some reason the nausea persists for more than two weeks or any other unwanted side effect, call our office to discuss changes in your therapy. You must take this medication daily and not miss a dose. Be aware that you will not feel any difference in the way you feel until approximately two weeks after taking your pill. The full effect of your medication will be in effect at one month, at which time we will re-evaluate your progress. Remember, this is not an addictive medication, and I recommend that you take this for at least 6 months for best resolution of your symptoms. Dr. Raisa Lambert Introducing [facility]! Erika Sr introduces [employer] patient portal. Now you can access parts of your medical record, email your doctor's office, and request medication refills online. 1. In your internet browser, go to [email or web address]. [employer] 2. Click on the First Time User? Click Here link in the Sign In box. You will see the New Member Sign Up page. 3. Enter your [employer] Access Code exactly as it appears below. You will not need to use this code after youve completed the sign-up process. If you do not sign up before the expiration date, you must request a new code. · [employer] Access Code: [email or web address] Expires: 10/18/2017  3:40 PM 
 
4.  Enter the last four digits of your Social Security Number (xxxx) and Date of Birth (mm/dd/yyyy) as indicated and click Submit. You will be taken to the next sign-up page. 5. Create a [employer] ID. This will be your [employer] login ID and cannot be changed, so think of one that is secure and easy to remember. 6. Create a [employer] password. You can change your password at any time. 7. Enter your Password Reset Question and Answer. This can be used at a later time if you forget your password. 8. Enter your e-mail address. You will receive e-mail notification when new information is available in [address] 9. Click Sign Up. You can now view and download portions of your medical record. 10. Click the Download Summary menu link to download a portable copy of your medical information. If you have questions, please visit the Frequently Asked Questions section of the [employer] website. Remember, [employer] is NOT to be used for urgent needs. For medical emergencies, dial 911. Now available from your iPhone and Android! Please provide this summary of care documentation to your next provider. Your primary care clinician is listed as MIREYA PAULINO. If you have any questions after today's visit, please call [phone].

## 2017-07-20 NOTE — PROGRESS NOTES
Patient here for nausea, fatigue, no energy and he feels like his afib is acting up. He feels like his heart \" skips\" Patient states he feels like a depression since his wife  last year. \" Like I can't get over it. . Maybe that's what my whole problem is. .\"    1. Have you been to the ER, urgent care clinic since your last visit? Hospitalized since your last visit? No    2. Have you seen or consulted any other health care providers outside of the [address] [place] [address] since your last visit? Include any pap smears or colon screening. No       Chief Complaint   Patient presents with    Nausea     low energy x 7 days, was hospitalized at [facility] for low sodium levels    Irregular Heart Beat     a-fib    Depression     loss wife one year ago and can not seem to get over it.      he is a 80y.o. year old male who presents for evalution. Reviewed PmHx, RxHx, FmHx, SocHx, AllgHx and updated and dated in the chart. Patient Active Problem List    Diagnosis    Ischemic colitis ([facility]    Advance care planning     Has a Lw      Insomnia    Primary hypertension    Acquired hypothyroidism    Mixed hyperlipidemia    Coronary atherosclerosis of native coronary artery    Cancer [facility])     prostate         Review of Systems - negative except as listed above in the HPI    Objective:     Vitals:    17 1525   BP: 155/80   Pulse: 69   Resp: 20   Temp: 97.7 °F (36.5 °C)   SpO2: 98%   Weight: 152 lb (68.9 kg)   Height: 5' 4\" (1.626 m)     Physical Examination: General appearance - alert, well appearing, and in no distress  Chest - clear to auscultation, no wheezes, rales or rhonchi, symmetric air entry  Heart - normal rate, regular rhythm, normal S1, S2, no murmurs, rubs, clicks or gallops    Assessment/ Plan:   Neha Montoya was seen today for nausea, irregular heart beat and depression. Diagnoses and all orders for this visit:    Nausea  -     ondansetron (ZOFRAN ODT) 4 mg disintegrating tablet;  Take 1 Tab by mouth every eight (8) hours as needed for Nausea. Primary hypertension  -inc but pt is upset  -go back on HCTZ    Depression, unspecified depression type  -     citalopram (CELEXA) 10 mg tablet; Take 1 Tab by mouth daily. Indications: ANXIETY WITH DEPRESSION       Follow-up Disposition:  Return in about 1 month (around 8/20/2017). I have discussed the diagnosis with the patient and the intended plan as seen in the above orders. The patient understands and agrees with the plan. The patient has received an after-visit summary and questions were answered concerning future plans. Medication Side Effects and Warnings were discussed with patient  Patient Labs were reviewed and or requested:  Patient Past Records were reviewed and or requested    Leydi Zee M.D. Patient Instructions   I have started you on an anti-depressant combo anti-anxiety medication today. The most common side effect that you can experience is nausea during the first week but this will subside. If for some reason the nausea persists for more than two weeks or any other unwanted side effect, call our office to discuss changes in your therapy. You must take this medication daily and not miss a dose. Be aware that you will not feel any difference in the way you feel until approximately two weeks after taking your pill. The full effect of your medication will be in effect at one month, at which time we will re-evaluate your progress. Remember, this is not an addictive medication, and I recommend that you take this for at least 6 months for best resolution of your symptoms.     Dr. Raisa Lambert

## 2017-07-20 NOTE — PATIENT INSTRUCTIONS
I have started you on an anti-depressant combo anti-anxiety medication today. The most common side effect that you can experience is nausea during the first week but this will subside. If for some reason the nausea persists for more than two weeks or any other unwanted side effect, call our office to discuss changes in your therapy. You must take this medication daily and not miss a dose. Be aware that you will not feel any difference in the way you feel until approximately two weeks after taking your pill. The full effect of your medication will be in effect at one month, at which time we will re-evaluate your progress. Remember, this is not an addictive medication, and I recommend that you take this for at least 6 months for best resolution of your symptoms.     Dr. Hayes Sanders

## 2017-08-21 RX ORDER — DICYCLOMINE HYDROCHLORIDE 20 MG/1
TABLET ORAL
Qty: 270 TAB | Refills: 0 | Status: SHIPPED | OUTPATIENT
Start: 2017-08-21 | End: 2017-11-22 | Stop reason: SDUPTHER

## 2017-08-22 ENCOUNTER — OFFICE VISIT (OUTPATIENT)
Dept: FAMILY MEDICINE CLINIC | Age: 82
End: 2017-08-22

## 2017-08-22 VITALS
OXYGEN SATURATION: 98 % | RESPIRATION RATE: 20 BRPM | BODY MASS INDEX: 26.29 KG/M2 | TEMPERATURE: 97.5 F | WEIGHT: 154 LBS | HEIGHT: 64 IN | DIASTOLIC BLOOD PRESSURE: 76 MMHG | HEART RATE: 72 BPM | SYSTOLIC BLOOD PRESSURE: 161 MMHG

## 2017-08-22 DIAGNOSIS — I10 PRIMARY HYPERTENSION: Primary | ICD-10-CM

## 2017-08-22 DIAGNOSIS — F32.A DEPRESSION, UNSPECIFIED DEPRESSION TYPE: ICD-10-CM

## 2017-08-22 RX ORDER — CITALOPRAM 10 MG/1
10 TABLET ORAL DAILY
Qty: 30 TAB | Refills: 5 | Status: SHIPPED | OUTPATIENT
Start: 2017-08-22 | End: 2017-10-17

## 2017-08-22 NOTE — PROGRESS NOTES
Patient here for 1 month f/u htn, depression. 1. Have you been to the ER, urgent care clinic since your last visit? Hospitalized since your last visit? No    2. Have you seen or consulted any other health care providers outside of the [address] [place] [address] since your last visit? Include any pap smears or colon screening. No         Chief Complaint   Patient presents with    Follow-up     depression better    Hypertension     he is a 80y.o. year old male who presents for evalution. Reviewed PmHx, RxHx, FmHx, SocHx, AllgHx and updated and dated in the chart. Patient Active Problem List    Diagnosis    Ischemic colitis ([facility] [address]    Advance care planning     Has a Lw      Insomnia    Primary hypertension    Acquired hypothyroidism    Mixed hyperlipidemia    Coronary atherosclerosis of native coronary artery    Cancer [facility])     prostate         Review of Systems - negative except as listed above in the HPI    Objective:     Vitals:    08/22/17 1441   BP: 161/76   Pulse: 72   Resp: 20   Temp: 97.5 °F (36.4 °C)   SpO2: 98%   Weight: 154 lb (69.9 kg)   Height: 5' 4\" (1.626 m)     Physical Examination: General appearance - alert, well appearing, and in no distress  Chest - clear to auscultation, no wheezes, rales or rhonchi, symmetric air entry  Heart - normal rate, regular rhythm, normal S1, S2, no murmurs, rubs, clicks or gallops    Assessment/ Plan:   Diagnoses and all orders for this visit:    1. Primary hypertension  -better at home  -no changes at his age for now    2. Depression, unspecified depression type  -     citalopram (CELEXA) 10 mg tablet; Take 1 Tab by mouth daily. Indications: ANXIETY WITH DEPRESSION  -better with rx       Follow-up Disposition:  Return in about 3 months (around 11/22/2017). I have discussed the diagnosis with the patient and the intended plan as seen in the above orders. The patient understands and agrees with the plan.  The patient has received an after-visit summary and questions were answered concerning future plans. Medication Side Effects and Warnings were discussed with patient  Patient Labs were reviewed and or requested:  Patient Past Records were reviewed and or requested    Autumn Wiley M.D. There are no Patient Instructions on file for this visit.

## 2017-08-22 NOTE — MR AVS SNAPSHOT
Visit Information Date & Time Provider Department Dept. Phone Encounter #  
 8/22/2017  2:10 PM Jeaneth Cormier MD [address] [facility] [phone] [id number] Follow-up Instructions Return in about 3 months (around 11/22/2017). Upcoming Health Maintenance Date Due DTaP/Tdap/Td series (1 - Tdap) 11/18/2006 INFLUENZA AGE 9 TO ADULT 8/1/2017 MEDICARE YEARLY EXAM 1/19/2018 GLAUCOMA SCREENING Q2Y 4/17/2019 COLONOSCOPY 10/6/2021 Allergies as of 8/22/2017  Review Complete On: 8/22/2017 By: Jeaneth Cormier MD  
  
 Severity Noted Reaction Type Reactions Sulfa (Sulfonamide Antibiotics)  06/16/2010    Unknown (comments) Current Immunizations  Reviewed on 7/12/2017 Name Date Influenza High Dose Vaccine PF 10/17/2016 Influenza Vaccine 8/19/2015, 11/7/2013 Pneumococcal Conjugate (PCV-13) 7/8/2017, 12/8/2015 TD Vaccine 11/17/2006 ZZZ-RETIRED (DO NOT USE) Pneumococcal Vaccine (Unspecified Type) 5/19/2005 Zoster 10/1/2009 Zoster Vaccine, Live 8/2/2016 Not reviewed this visit You Were Diagnosed With   
  
 Codes Comments Primary hypertension    -  Primary ICD-10-CM: I10 
ICD-9-CM: 401.9 Depression, unspecified depression type     ICD-10-CM: F32.9 ICD-9-CM: 721 Vitals BP Pulse Temp Resp Height(growth percentile) Weight(growth percentile) 161/76 72 97.5 °F (36.4 °C) 20 5' 4\" (1.626 m) 154 lb (69.9 kg) SpO2 BMI Smoking Status 98% 26.43 kg/m2 Former Smoker Vitals History BMI and BSA Data Body Mass Index Body Surface Area  
 26.43 kg/m 2 1.78 m 2 Preferred Pharmacy Pharmacy Name Phone [facility] [address] [place] [address], [address] [place] [address] [phone] Your Updated Medication List  
  
   
This list is accurate as of: 8/22/17  2:54 PM.  Always use your most recent med list.  
  
  
  
  
 aspirin delayed-release 81 mg tablet Take  by mouth daily. atorvastatin 80 mg tablet Commonly known as:  LIPITOR  
TAKE ONE TABLET BY MOUTH EVERY DAY  
  
 citalopram 10 mg tablet Commonly known as:  Stephenie Salvador Take 1 Tab by mouth daily. Indications: ANXIETY WITH DEPRESSION  
  
 dicyclomine 20 mg tablet Commonly known as:  BENTYL TAKE ONE TABLET BY MOUTH EVERY SIX HOURS * hydroCHLOROthiazide 25 mg tablet Commonly known as:  HYDRODIURIL Take 1 Tab by mouth daily for 360 days. * hydroCHLOROthiazide 25 mg tablet Commonly known as:  HYDRODIURIL Take 1 Tab by mouth daily for 360 days. hyoscyamine SL 0.125 mg SL tablet Commonly known as:  LEVSIN/SL  
1 Tab by SubLINGual route every six (6) hours as needed for Cramping. levothyroxine 112 mcg tablet Commonly known as:  SYNTHROID  
TAKE 1 TABLET BY MOUTH DAILY BEFORE BREAKFAST  
  
 lisinopril 20 mg tablet Commonly known as:  PRINIVIL, ZESTRIL  
TAKE 1 TABLET BY MOUTH DAILY  
  
 loratadine 10 mg tablet Commonly known as:  Melissa Galea Take 1 Tab by mouth daily for 360 days. LORazepam 1 mg tablet Commonly known as:  ATIVAN  
TAKE 1 TABLET BY MOUTH EVERY 8 HOURS AS NEEDED  
  
 omega-3s-dha-epa-fish oil-D3 360 mg-1,200 mg -1,000 unit Cap Take  by mouth. ondansetron 4 mg disintegrating tablet Commonly known as:  ZOFRAN ODT Take 1 Tab by mouth every eight (8) hours as needed for Nausea. PROBIOTIC 4X 10-15 mg Tbec Generic drug:  B.infantis-B.ani-B.long-B.bifi Take  by mouth. TYLENOL 325 mg tablet Generic drug:  acetaminophen Take  by mouth every four (4) hours as needed for Pain. * Notice: This list has 2 medication(s) that are the same as other medications prescribed for you. Read the directions carefully, and ask your doctor or other care provider to review them with you. Prescriptions Sent to Pharmacy  Refills  
 citalopram (CELEXA) 10 mg tablet 5  
 Sig: Take 1 Tab by mouth daily. Indications: ANXIETY WITH DEPRESSION Class: Normal  
 Pharmacy: [facility] [address] [place] [address] [address] [place] [address] #: [phone] Route: Oral  
  
Follow-up Instructions Return in about 3 months (around 11/22/2017). Introducing [facility] SERVICES! Darrick Sandhu introduces Kids Calendar patient portal. Now you can access parts of your medical record, email your doctor's office, and request medication refills online. 1. In your internet browser, go to [email or web address] 2. Click on the First Time User? Click Here link in the Sign In box. You will see the New Member Sign Up page. 3. Enter your Kids Calendar Access Code exactly as it appears below. You will not need to use this code after youve completed the sign-up process. If you do not sign up before the expiration date, you must request a new code. · Kids Calendar Access Code: [email or web address] Expires: 10/18/2017  3:40 PM 
 
4. Enter the last four digits of your Social Security Number (xxxx) and Date of Birth (mm/dd/yyyy) as indicated and click Submit. You will be taken to the next sign-up page. 5. Create a Kids Calendar ID. This will be your Kids Calendar login ID and cannot be changed, so think of one that is secure and easy to remember. 6. Create a Kids Calendar password. You can change your password at any time. 7. Enter your Password Reset Question and Answer. This can be used at a later time if you forget your password. 8. Enter your e-mail address. You will receive e-mail notification when new information is available in [address] 9. Click Sign Up. You can now view and download portions of your medical record. 10. Click the Download Summary menu link to download a portable copy of your medical information. If you have questions, please visit the Frequently Asked Questions section of the Kids Calendar website.  Remember, Kids Calendar is NOT to be used for urgent needs. For medical emergencies, dial 911. Now available from your iPhone and Android! Please provide this summary of care documentation to your next provider. Your primary care clinician is listed as MIREYA PAULINO. If you have any questions after today's visit, please call [phone].

## 2017-08-27 DIAGNOSIS — E03.9 ACQUIRED HYPOTHYROIDISM: ICD-10-CM

## 2017-08-27 RX ORDER — LEVOTHYROXINE SODIUM 112 UG/1
TABLET ORAL
Qty: 90 TAB | Refills: 0 | Status: SHIPPED | OUTPATIENT
Start: 2017-08-27 | End: 2017-11-22 | Stop reason: SDUPTHER

## 2017-08-28 RX ORDER — ATORVASTATIN CALCIUM 80 MG/1
TABLET, FILM COATED ORAL
Qty: 30 TAB | Refills: 0 | Status: SHIPPED | OUTPATIENT
Start: 2017-08-28 | End: 2017-08-28 | Stop reason: SDUPTHER

## 2017-08-28 RX ORDER — ATORVASTATIN CALCIUM 80 MG/1
TABLET, FILM COATED ORAL
Qty: 90 TAB | Refills: 0 | Status: SHIPPED | OUTPATIENT
Start: 2017-08-28 | End: 2017-11-25 | Stop reason: SDUPTHER

## 2017-09-18 DIAGNOSIS — I10 PRIMARY HYPERTENSION: ICD-10-CM

## 2017-09-18 RX ORDER — LISINOPRIL 20 MG/1
TABLET ORAL
Qty: 30 TAB | Refills: 3 | Status: SHIPPED | OUTPATIENT
Start: 2017-09-18 | End: 2018-06-25 | Stop reason: SDUPTHER

## 2017-10-12 RX ORDER — LORAZEPAM 1 MG/1
TABLET ORAL
Qty: 90 TAB | Refills: 2 | OUTPATIENT
Start: 2017-10-12 | End: 2018-01-16 | Stop reason: ALTCHOICE

## 2017-10-12 NOTE — TELEPHONE ENCOUNTER
Nurse informed the pt and pharmacy, the medication that was requested, was approved and sent to the pharmacy, pt verbalize understanding.

## 2017-10-13 ENCOUNTER — HOSPITAL ENCOUNTER (OUTPATIENT)
Dept: LAB | Age: 82
Discharge: HOME | End: 2017-10-13
Payer: MEDICARE

## 2017-10-13 ENCOUNTER — OFFICE VISIT (OUTPATIENT)
Dept: FAMILY MEDICINE CLINIC | Age: 82
End: 2017-10-13

## 2017-10-13 VITALS
DIASTOLIC BLOOD PRESSURE: 80 MMHG | SYSTOLIC BLOOD PRESSURE: 132 MMHG | OXYGEN SATURATION: 94 % | TEMPERATURE: 98.2 F | RESPIRATION RATE: 18 BRPM | HEIGHT: 64 IN | BODY MASS INDEX: 26.98 KG/M2 | WEIGHT: 158 LBS | HEART RATE: 65 BPM

## 2017-10-13 DIAGNOSIS — E03.9 ACQUIRED HYPOTHYROIDISM: ICD-10-CM

## 2017-10-13 DIAGNOSIS — I10 PRIMARY HYPERTENSION: Primary | ICD-10-CM

## 2017-10-13 DIAGNOSIS — E78.2 MIXED HYPERLIPIDEMIA: ICD-10-CM

## 2017-10-13 DIAGNOSIS — B07.8 OTHER VIRAL WARTS: ICD-10-CM

## 2017-10-13 PROCEDURE — 80053 COMPREHEN METABOLIC PANEL: CPT

## 2017-10-13 PROCEDURE — 84443 ASSAY THYROID STIM HORMONE: CPT

## 2017-10-13 PROCEDURE — 80061 LIPID PANEL: CPT

## 2017-10-13 NOTE — PATIENT INSTRUCTIONS
Warts: Care Instructions  Your Care Instructions  A wart is a harmless skin growth caused by a virus. The virus makes the top layer of skin grow quickly, causing a wart. Warts usually go away on their own in months or years. There are several types of warts. Common warts appear most often on the hands, but they may be anywhere on the body. Plantar warts occur on the soles of the feet and may cause pain when you walk. Warts spread easily. You can reinfect yourself by touching the wart and then touching another part of your body. You can infect others by sharing towels, razors, or other personal items. Most warts do not need treatment and go away on their own. But if warts cause pain or spread, your doctor may recommend that you use an over-the-counter treatment. These include salicylic acid or duct tape. Or your doctor may prescribe a stronger medicine to put on warts or may inject them with medicine. The doctor also can remove warts through surgery or by freezing them. Follow-up care is a key part of your treatment and safety. Be sure to make and go to all appointments, and call your doctor if you are having problems. It's also a good idea to know your test results and keep a list of the medicines you take. How can you care for yourself at home? For common warts  · Use salicylic acid or duct tape as your doctor directs. You put the medicine or the tape on a wart for several days and then file down the dead skin on the wart. You use the salicylic acid treatment for 2 to 3 months or the tape for 1 to 2 months. · If your doctor prescribes medicine to put on warts, use it exactly as directed. Call your doctor if you think you are having a problem with your medicine. For plantar (foot) warts  · Wear comfortable shoes and socks. Avoid high heels and shoes that put a lot of pressure on your foot. · Pad the wart with doughnut-shaped felt or a moleskin patch. You can buy these at a [employer].  Put the pad around the plantar wart so that it relieves pressure on the wart. You also can place pads or cushions in your shoes to make walking more comfortable. · Take an over-the-counter pain medicine, such as acetaminophen (Tylenol), ibuprofen (Advil, Motrin), or naproxen (Aleve). Read and follow all instructions on the label. · Do not take two or more pain medicines at the same time unless the doctor told you to. Many pain medicines have acetaminophen, which is Tylenol. Too much acetaminophen (Tylenol) can be harmful. To avoid spreading warts  · Keep warts covered with a bandage or athletic tape. · Do not bite your nails or cuticles. This may spread warts from one finger to another. When should you call for help? Call your doctor now or seek immediate medical care if:  · You have signs of infection, such as:  ¨ Increased pain, swelling, warmth, or redness. ¨ Red streaks leading from a wart. ¨ Pus draining from a wart. ¨ A fever. Watch closely for changes in your health, and be sure to contact your doctor if:  · You do not get better as expected. Where can you learn more? Go to http://camille-joe.info/. Enter Z337 in the search box to learn more about \"Warts: Care Instructions. \"  Current as of: October 19, 2016  Content Version: 11.3  © 2526-3908 [employer]. Care instructions adapted under license by [employer] (which disclaims liability or warranty for this information). If you have questions about a medical condition or this instruction, always ask your healthcare professional. [place] [address] any warranty or liability for your use of this information.

## 2017-10-13 NOTE — PROGRESS NOTES
Michela Kehr is a 80 y.o. male   Chief Complaint   Patient presents with    Warts    pt here for what he thibnks may be a mole or a wart that recently came up on L side of abd, does nopt hurt just bothers him. Did itch some. Pt is also due for fasting labs and recheck of his TSH, pt is having no issues with his medication and hias BP remains very well controlled on medication. he is a 80y.o. year old male who presents for evalution. Reviewed PmHx, RxHx, FmHx, SocHx, AllgHx and updated and dated in the chart. Review of Systems - negative except as listed above in the HPI    Objective:     Vitals:    10/13/17 0817   BP: 132/80   Pulse: 65   Resp: 18   Temp: 98.2 °F (36.8 °C)   TempSrc: Oral   SpO2: 94%   Weight: 158 lb (71.7 kg)   Height: 5' 4\" (1.626 m)       Current Outpatient Prescriptions   Medication Sig    LORazepam (ATIVAN) 1 mg tablet TAKE 1 TABLET BY MOUTH EVERY 8 HOURS AS NEEDED    lisinopril (PRINIVIL, ZESTRIL) 20 mg tablet TAKE 1 TABLET BY MOUTH DAILY    atorvastatin (LIPITOR) 80 mg tablet TAKE 1 TABLET BY MOUTH EVERY DAY    levothyroxine (SYNTHROID) 112 mcg tablet TAKE 1 TABLET BY MOUTH DAILY BEFORE BREAKFAST    citalopram (CELEXA) 10 mg tablet Take 1 Tab by mouth daily. Indications: ANXIETY WITH DEPRESSION    dicyclomine (BENTYL) 20 mg tablet TAKE ONE TABLET BY MOUTH EVERY SIX HOURS    ondansetron (ZOFRAN ODT) 4 mg disintegrating tablet Take 1 Tab by mouth every eight (8) hours as needed for Nausea.  hydroCHLOROthiazide (HYDRODIURIL) 25 mg tablet Take 1 Tab by mouth daily for 360 days.  loratadine (CLARITIN) 10 mg tablet Take 1 Tab by mouth daily for 360 days. (Patient taking differently: Take 10 mg by mouth daily as needed.)    B.infantis-B.ani-B.long-B.bifi (PROBIOTIC 4X) 10-15 mg TbEC Take  by mouth.  hyoscyamine SL (LEVSIN/SL) 0.125 mg SL tablet 1 Tab by SubLINGual route every six (6) hours as needed for Cramping.     aspirin delayed-release 81 mg tablet Take  by mouth daily.  acetaminophen (TYLENOL) 325 mg tablet Take  by mouth every four (4) hours as needed for Pain.  omega-3s-dha-epa-fish oil-D3 360 mg-1,200 mg -1,000 unit cap Take  by mouth. No current facility-administered medications for this visit. Physical Examination: General appearance - alert, well appearing, and in no distress  Mental status - alert, oriented to person, place, and time  Eyes - pupils equal and reactive, extraocular eye movements intact  Chest - clear to auscultation, no wheezes, rales or rhonchi, symmetric air entry  Heart - normal rate, regular rhythm, normal S1, S2, no murmurs, rubs, clicks or gallops  Abdomen - soft, nontender, nondistended, no masses or organomegaly  Wart of L side of abd      Assessment/ Plan:   Diagnoses and all orders for this visit:    1. Primary hypertension  -     METABOLIC PANEL, COMPREHENSIVE    2. Acquired hypothyroidism  -     TSH 3RD GENERATION    3. Mixed hyperlipidemia  -     METABOLIC PANEL, COMPREHENSIVE  -     LIPID PANEL    4. Other viral warts  -     [id number] - DESTRUC BENIGN LESION, UP TO 14 LESIONS       Follow-up Disposition:  Return in about 6 months (around 4/13/2018), or if symptoms worsen or fail to improve. I have discussed the diagnosis with the patient and the intended plan as seen in the above orders. The patient has received an after-visit summary and questions were answered concerning future plans. Pt conveyed understanding of plan. Medication Side Effects and Warnings were discussed with patient      Charly Nielson, DO   Patient has agreed to the procedure and understands the risk of adverse reactions. Procedure:  Cryotherapy to 1 wart on L abd    Cryo with 3 freeze thaw cycles approx 10-15 sec each no adverse reaction to procedure. Patient given instructions and expectations of after visit care.        [facility]  OFFICE PROCEDURE PROGRESS NOTE        Chart reviewed for the following:   I, Charly Nielson, , have reviewed the History, Physical and updated the Allergic reactions for Patrick Parker     TIME OUT performed immediately prior to start of procedure:   I, Kait Parker DO, have performed the following reviews on [place] prior to the start of the procedure:            * Patient was identified by name and date of birth   * Agreement on procedure being performed was verified  * Risks and Benefits explained to the patient  * Procedure site verified and marked as necessary  * Patient was positioned for comfort  * Consent was verbally obtaine     Time: 830a      Date of procedure: 5/18/2017    Procedure performed by:  Kait Parker DO    Provider assisted by: self DO    Patient assisted by: self    How tolerated by patient: tolerated the procedure well with no complications    Post Procedural Pain Scale: 2 - Hurts Little Bit    Comments: none

## 2017-10-13 NOTE — LETTER
10/17/2017 8:15 AM 
 
Mr. Doni Foreman [address] [facility]. [address] [place] [id number] Dear Doni Foreman: 
 
Please find your most recent results below. Resulted Orders METABOLIC PANEL, COMPREHENSIVE Result Value Ref Range Glucose 93 65 - 99 mg/dL BUN 19 8 - 27 mg/dL Creatinine 1.04 0.76 - 1.27 mg/dL GFR est non-AA 65 >59 mL/min/1.73 GFR est AA 75 >59 mL/min/1.73  
 BUN/Creatinine ratio 18 10 - 24 Sodium 137 134 - 144 mmol/L Potassium 4.7 3.5 - 5.2 mmol/L Chloride 98 96 - 106 mmol/L  
 CO2 25 18 - 29 mmol/L Calcium 9.0 8.6 - 10.2 mg/dL Protein, total 6.7 6.0 - 8.5 g/dL Albumin 4.1 3.5 - 4.7 g/dL GLOBULIN, TOTAL 2.6 1.5 - 4.5 g/dL A-G Ratio 1.6 1.2 - 2.2 Bilirubin, total 0.3 0.0 - 1.2 mg/dL Alk. phosphatase 94 39 - 117 IU/L  
 AST (SGOT) 17 0 - 40 IU/L  
 ALT (SGPT) 10 0 - 44 IU/L Narrative Performed at:  [address]  [id number] : Freida Gomes MD, Phone:  [phone] LIPID PANEL Result Value Ref Range Cholesterol, total 144 100 - 199 mg/dL Triglyceride 197 (H) 0 - 149 mg/dL HDL Cholesterol 33 (L) >39 mg/dL VLDL, calculated 39 5 - 40 mg/dL LDL, calculated 72 0 - 99 mg/dL Narrative Performed at:  [address]  [id number] : Freida Gomes MD, Phone:  [phone] TSH 3RD GENERATION Result Value Ref Range TSH 2.390 0.450 - 4.500 uIU/mL Narrative Performed at:  [address]  [id number] : Freida Gomes MD, Phone:  [phone] SPECIMEN STATUS REPORT Result Value Ref Range SPECIMEN STATUS REPORT COMMENT Comment:  
   No Test Indicated A lavender top tube was received with no test indicated Dear Doctor, The requisition we received for the above patient has no test 
indicated on the request form for one or more of the specimens submitted. The [place] Code of [employer] requires a 
written and signed request be forwarded to the testing laboratory 
following the verbal order of a laboratory test. 
Date:_________________________________ ICD-9/10 Diagnosis Code(s):___________________________________________ Physician or Authorized Designee Signature: 
______________________________________________________________________ Your signature confirms your order of the test(s) listed Required test name(s):________________________________________________ Required test number(s):______________________________________________ Please provide requested information and fax to [phone] 
to expedite testing. Narrative Performed at:  [address]  [id number] : Tacos Randall MD, Phone:  [phone] CVD REPORT Result Value Ref Range INTERPRETATION Note Comment:  
   Supplement report is available. Narrative Performed at:  [address] [address] [place], [place]  [id number] : Tiffany De La Vega PhD, Phone:  [phone] RECOMMENDATIONS: 
Labs look good, trigs high but improved keep up the good work. [employer] 6 months Please call me if you have any questions: [phone] Sincerely, [address], DO

## 2017-10-13 NOTE — MR AVS SNAPSHOT
Visit Information Date & Time Provider Department Dept. Phone Encounter #  
 10/13/2017  8:20 AM Brad Menjivar, Panda Murguia [phone] [id number] Follow-up Instructions Return if symptoms worsen or fail to improve. Upcoming Health Maintenance Date Due DTaP/Tdap/Td series (1 - Tdap) 11/18/2006 MEDICARE YEARLY EXAM 1/19/2018 GLAUCOMA SCREENING Q2Y 4/17/2019 COLONOSCOPY 10/6/2021 Allergies as of 10/13/2017  Review Complete On: 10/13/2017 By: Brad Menjivar DO Severity Noted Reaction Type Reactions Sulfa (Sulfonamide Antibiotics)  06/16/2010    Unknown (comments) Current Immunizations  Reviewed on 7/12/2017 Name Date Influenza High Dose Vaccine PF 8/15/2017, 10/17/2016 Influenza Vaccine 8/19/2015, 11/7/2013 Pneumococcal Conjugate (PCV-13) 7/8/2017, 12/8/2015 TD Vaccine 11/17/2006 ZZZ-RETIRED (DO NOT USE) Pneumococcal Vaccine (Unspecified Type) 5/19/2005 Zoster 10/1/2009 Zoster Vaccine, Live 8/2/2016 Not reviewed this visit You Were Diagnosed With   
  
 Codes Comments Primary hypertension    -  Primary ICD-10-CM: I10 
ICD-9-CM: 401.9 Acquired hypothyroidism     ICD-10-CM: E03.9 ICD-9-CM: 244.9 Mixed hyperlipidemia     ICD-10-CM: E78.2 ICD-9-CM: 272.2 Other viral warts     ICD-10-CM: B07.8 ICD-9-CM: 078.19 Vitals BP Pulse Temp Resp Height(growth percentile) Weight(growth percentile) 132/80 65 98.2 °F (36.8 °C) (Oral) 18 5' 4\" (1.626 m) 158 lb (71.7 kg) SpO2 BMI Smoking Status 94% 27.12 kg/m2 Former Smoker Vitals History BMI and BSA Data Body Mass Index Body Surface Area  
 27.12 kg/m 2 1.8 m 2 Preferred Pharmacy Pharmacy Name Phone [facility] [address] [place] [address], [address] [place] [address] Drive [phone] Your Updated Medication List  
  
   
 This list is accurate as of: 10/13/17  8:43 AM.  Always use your most recent med list.  
  
  
  
  
 aspirin delayed-release 81 mg tablet Take  by mouth daily. atorvastatin 80 mg tablet Commonly known as:  LIPITOR  
TAKE 1 TABLET BY MOUTH EVERY DAY  
  
 citalopram 10 mg tablet Commonly known as:  Lenward Campanile Take 1 Tab by mouth daily. Indications: ANXIETY WITH DEPRESSION  
  
 dicyclomine 20 mg tablet Commonly known as:  BENTYL TAKE ONE TABLET BY MOUTH EVERY SIX HOURS  
  
 hydroCHLOROthiazide 25 mg tablet Commonly known as:  HYDRODIURIL Take 1 Tab by mouth daily for 360 days. hyoscyamine SL 0.125 mg SL tablet Commonly known as:  LEVSIN/SL  
1 Tab by SubLINGual route every six (6) hours as needed for Cramping. levothyroxine 112 mcg tablet Commonly known as:  SYNTHROID  
TAKE 1 TABLET BY MOUTH DAILY BEFORE BREAKFAST  
  
 lisinopril 20 mg tablet Commonly known as:  PRINIVIL, ZESTRIL  
TAKE 1 TABLET BY MOUTH DAILY  
  
 loratadine 10 mg tablet Commonly known as:  Nelda Martinet Take 1 Tab by mouth daily for 360 days. LORazepam 1 mg tablet Commonly known as:  ATIVAN  
TAKE 1 TABLET BY MOUTH EVERY 8 HOURS AS NEEDED  
  
 omega-3s-dha-epa-fish oil-D3 360 mg-1,200 mg -1,000 unit Cap Take  by mouth. ondansetron 4 mg disintegrating tablet Commonly known as:  ZOFRAN ODT Take 1 Tab by mouth every eight (8) hours as needed for Nausea. PROBIOTIC 4X 10-15 mg Tbec Generic drug:  B.infantis-B.ani-B.long-B.bifi Take  by mouth. TYLENOL 325 mg tablet Generic drug:  acetaminophen Take  by mouth every four (4) hours as needed for Pain. We Performed the Following LIPID PANEL [56150 CPT(R)] METABOLIC PANEL, COMPREHENSIVE [53072 CPT(R)] TSH 3RD GENERATION [85667 CPT(R)] Follow-up Instructions Return if symptoms worsen or fail to improve. Patient Instructions Warts: Care Instructions Your Care Instructions A wart is a harmless skin growth caused by a virus. The virus makes the top layer of skin grow quickly, causing a wart. Warts usually go away on their own in months or years. There are several types of warts. Common warts appear most often on the hands, but they may be anywhere on the body. Plantar warts occur on the soles of the feet and may cause pain when you walk. Warts spread easily. You can reinfect yourself by touching the wart and then touching another part of your body. You can infect others by sharing towels, razors, or other personal items. Most warts do not need treatment and go away on their own. But if warts cause pain or spread, your doctor may recommend that you use an over-the-counter treatment. These include salicylic acid or duct tape. Or your doctor may prescribe a stronger medicine to put on warts or may inject them with medicine. The doctor also can remove warts through surgery or by freezing them. Follow-up care is a key part of your treatment and safety. Be sure to make and go to all appointments, and call your doctor if you are having problems. It's also a good idea to know your test results and keep a list of the medicines you take. How can you care for yourself at home? For common warts · Use salicylic acid or duct tape as your doctor directs. You put the medicine or the tape on a wart for several days and then file down the dead skin on the wart. You use the salicylic acid treatment for 2 to 3 months or the tape for 1 to 2 months. · If your doctor prescribes medicine to put on warts, use it exactly as directed. Call your doctor if you think you are having a problem with your medicine. For plantar (foot) warts · Wear comfortable shoes and socks. Avoid high heels and shoes that put a lot of pressure on your foot. · Pad the wart with doughnut-shaped felt or a moleskin patch. You can buy these at a [facility].  Put the pad around the plantar wart so that it relieves pressure on the wart. You also can place pads or cushions in your shoes to make walking more comfortable. · Take an over-the-counter pain medicine, such as acetaminophen (Tylenol), ibuprofen (Advil, Motrin), or naproxen (Aleve). Read and follow all instructions on the label. · Do not take two or more pain medicines at the same time unless the doctor told you to. Many pain medicines have acetaminophen, which is Tylenol. Too much acetaminophen (Tylenol) can be harmful. To avoid spreading warts · Keep warts covered with a bandage or athletic tape. · Do not bite your nails or cuticles. This may spread warts from one finger to another. When should you call for help? Call your doctor now or seek immediate medical care if: 
· You have signs of infection, such as: 
¨ Increased pain, swelling, warmth, or redness. ¨ Red streaks leading from a wart. ¨ Pus draining from a wart. ¨ A fever. Watch closely for changes in your health, and be sure to contact your doctor if: 
· You do not get better as expected. Where can you learn more? Go to http://camille-joe.info/. Enter S461 in the search box to learn more about \"Warts: Care Instructions. \" Current as of: October 19, 2016 Content Version: 11.3 © 6792-2564 Healthwise, Incorporated. Care instructions adapted under license by [employer] (which disclaims liability or warranty for this information). If you have questions about a medical condition or this instruction, always ask your healthcare professional. [place] [address] any warranty or liability for your use of this information. Introducing [facility]! [address] introduces ChipX patient portal. Now you can access parts of your medical record, email your doctor's office, and request medication refills online. 1. In your internet browser, go to [email or web address] 2. Click on the First Time User? Click Here link in the Sign In box. You will see the New Member Sign Up page. 3. Enter your Dishable Access Code exactly as it appears below. You will not need to use this code after youve completed the sign-up process. If you do not sign up before the expiration date, you must request a new code. · Dishable Access Code: [email or web address] Expires: 10/18/2017  3:40 PM 
 
4. Enter the last four digits of your Social Security Number (xxxx) and Date of Birth (mm/dd/yyyy) as indicated and click Submit. You will be taken to the next sign-up page. 5. Create a Dishable ID. This will be your Dishable login ID and cannot be changed, so think of one that is secure and easy to remember. 6. Create a Dishable password. You can change your password at any time. 7. Enter your Password Reset Question and Answer. This can be used at a later time if you forget your password. 8. Enter your e-mail address. You will receive e-mail notification when new information is available in [address] 9. Click Sign Up. You can now view and download portions of your medical record. 10. Click the Download Summary menu link to download a portable copy of your medical information. If you have questions, please visit the Frequently Asked Questions section of the Dishable website. Remember, Dishable is NOT to be used for urgent needs. For medical emergencies, dial 911. Now available from your iPhone and Android! Please provide this summary of care documentation to your next provider. Your primary care clinician is listed as MIREYA PAULINO. If you have any questions after today's visit, please call [phone].

## 2017-10-16 LAB — SPECIMEN STATUS REPORT, ROLRST: NORMAL

## 2017-10-17 ENCOUNTER — OFFICE VISIT (OUTPATIENT)
Dept: FAMILY MEDICINE CLINIC | Age: 82
End: 2017-10-17

## 2017-10-17 VITALS
SYSTOLIC BLOOD PRESSURE: 145 MMHG | HEIGHT: 64 IN | HEART RATE: 81 BPM | RESPIRATION RATE: 16 BRPM | TEMPERATURE: 97.9 F | WEIGHT: 158 LBS | DIASTOLIC BLOOD PRESSURE: 74 MMHG | OXYGEN SATURATION: 98 % | BODY MASS INDEX: 26.98 KG/M2

## 2017-10-17 DIAGNOSIS — H61.23 BILATERAL IMPACTED CERUMEN: ICD-10-CM

## 2017-10-17 DIAGNOSIS — J01.00 ACUTE MAXILLARY SINUSITIS, RECURRENCE NOT SPECIFIED: Primary | ICD-10-CM

## 2017-10-17 LAB
ALBUMIN SERPL-MCNC: 4.1 G/DL (ref 3.5–4.7)
ALBUMIN/GLOB SERPL: 1.6 {RATIO} (ref 1.2–2.2)
ALP SERPL-CCNC: 94 IU/L (ref 39–117)
ALT SERPL-CCNC: 10 IU/L (ref 0–44)
AST SERPL-CCNC: 17 IU/L (ref 0–40)
BILIRUB SERPL-MCNC: 0.3 MG/DL (ref 0–1.2)
BUN SERPL-MCNC: 19 MG/DL (ref 8–27)
BUN/CREAT SERPL: 18 (ref 10–24)
CALCIUM SERPL-MCNC: 9 MG/DL (ref 8.6–10.2)
CHLORIDE SERPL-SCNC: 98 MMOL/L (ref 96–106)
CHOLEST SERPL-MCNC: 144 MG/DL (ref 100–199)
CO2 SERPL-SCNC: 25 MMOL/L (ref 18–29)
CREAT SERPL-MCNC: 1.04 MG/DL (ref 0.76–1.27)
GLOBULIN SER CALC-MCNC: 2.6 G/DL (ref 1.5–4.5)
GLUCOSE SERPL-MCNC: 93 MG/DL (ref 65–99)
HDLC SERPL-MCNC: 33 MG/DL
INTERPRETATION, 910389: NORMAL
LDLC SERPL CALC-MCNC: 72 MG/DL (ref 0–99)
POTASSIUM SERPL-SCNC: 4.7 MMOL/L (ref 3.5–5.2)
PROT SERPL-MCNC: 6.7 G/DL (ref 6–8.5)
SODIUM SERPL-SCNC: 137 MMOL/L (ref 134–144)
TRIGL SERPL-MCNC: 197 MG/DL (ref 0–149)
TSH SERPL DL<=0.005 MIU/L-ACNC: 2.39 UIU/ML (ref 0.45–4.5)
VLDLC SERPL CALC-MCNC: 39 MG/DL (ref 5–40)

## 2017-10-17 RX ORDER — CEFDINIR 300 MG/1
300 CAPSULE ORAL 2 TIMES DAILY
Qty: 20 CAP | Refills: 0 | Status: SHIPPED | OUTPATIENT
Start: 2017-10-17 | End: 2017-10-27

## 2017-10-17 NOTE — PATIENT INSTRUCTIONS
I have prescribed you the antibiotic Omnicef. Take this medication as directed and complete the entire course, even if you're feeling better. If you miss a dose, take it as soon as possible. Common side effects of this medication include diarrhea, vomiting, and rash. Notify your provider if symptoms do not improve one week after completing the course of this antibiotic.

## 2017-10-17 NOTE — MR AVS SNAPSHOT
Visit Information Date & Time Provider Department Dept. Phone Encounter #  
 10/17/2017  3:00 PM Estuardo Crystal NP [address] [facility] [phone] [id number] Follow-up Instructions Return if symptoms worsen or fail to improve. Upcoming Health Maintenance Date Due DTaP/Tdap/Td series (1 - Tdap) 11/18/2006 MEDICARE YEARLY EXAM 1/19/2018 GLAUCOMA SCREENING Q2Y 4/17/2019 COLONOSCOPY 10/6/2021 Allergies as of 10/17/2017  Review Complete On: 10/17/2017 By: Estuardo Crystal NP Severity Noted Reaction Type Reactions Sulfa (Sulfonamide Antibiotics)  06/16/2010    Unknown (comments) Current Immunizations  Reviewed on 7/12/2017 Name Date Influenza High Dose Vaccine PF 8/29/2017, 10/17/2016 Influenza Vaccine 8/19/2015, 11/7/2013 Pneumococcal Conjugate (PCV-13) 7/8/2017, 12/8/2015 TD Vaccine 11/17/2006 Tdap 8/29/2017 ZZZ-RETIRED (DO NOT USE) Pneumococcal Vaccine (Unspecified Type) 5/19/2005 Zoster 10/1/2009 Zoster Vaccine, Live 8/2/2016 Not reviewed this visit You Were Diagnosed With   
  
 Codes Comments Acute maxillary sinusitis, recurrence not specified    -  Primary ICD-10-CM: J01.00 ICD-9-CM: 461.0 Bilateral impacted cerumen     ICD-10-CM: H61.23 
ICD-9-CM: 380.4 Vitals BP Pulse Temp Resp Height(growth percentile) Weight(growth percentile) 145/74 (BP 1 Location: Right arm, BP Patient Position: Sitting) 81 97.9 °F (36.6 °C) (Oral) 16 5' 4\" (1.626 m) 158 lb (71.7 kg) SpO2 BMI Smoking Status 98% 27.12 kg/m2 Former Smoker Vitals History BMI and BSA Data Body Mass Index Body Surface Area  
 27.12 kg/m 2 1.8 m 2 Preferred Pharmacy Pharmacy Name Phone [facility] [address] [place] [address], [address] [place] [address] [phone] Your Updated Medication List  
  
   
 This list is accurate as of: 10/17/17  3:32 PM.  Always use your most recent med list.  
  
  
  
  
 aspirin delayed-release 81 mg tablet Take  by mouth daily. atorvastatin 80 mg tablet Commonly known as:  LIPITOR  
TAKE 1 TABLET BY MOUTH EVERY DAY  
  
 cefdinir 300 mg capsule Commonly known as:  OMNICEF Take 1 Cap by mouth two (2) times a day for 10 days. dicyclomine 20 mg tablet Commonly known as:  BENTYL TAKE ONE TABLET BY MOUTH EVERY SIX HOURS  
  
 hydroCHLOROthiazide 25 mg tablet Commonly known as:  HYDRODIURIL Take 1 Tab by mouth daily for 360 days. levothyroxine 112 mcg tablet Commonly known as:  SYNTHROID  
TAKE 1 TABLET BY MOUTH DAILY BEFORE BREAKFAST  
  
 lisinopril 20 mg tablet Commonly known as:  PRINIVIL, ZESTRIL  
TAKE 1 TABLET BY MOUTH DAILY LORazepam 1 mg tablet Commonly known as:  ATIVAN  
TAKE 1 TABLET BY MOUTH EVERY 8 HOURS AS NEEDED  
  
 omega-3s-dha-epa-fish oil-D3 360 mg-1,200 mg -1,000 unit Cap Take  by mouth. PROBIOTIC 4X 10-15 mg Tbec Generic drug:  B.infantis-B.ani-B.long-B.bifi Take  by mouth. TYLENOL 325 mg tablet Generic drug:  acetaminophen Take  by mouth every four (4) hours as needed for Pain. Prescriptions Sent to Pharmacy Refills  
 cefdinir (OMNICEF) 300 mg capsule 0 Sig: Take 1 Cap by mouth two (2) times a day for 10 days. Class: Normal  
 Pharmacy: [facility] [address] [place] [address] #: [phone] Route: Oral  
  
We Performed the Following [place] REMOVAL IMPACTED CERUMEN IRRIGATION/LVG UNILAT [43477 CPT(R)] Follow-up Instructions Return if symptoms worsen or fail to improve. Patient Instructions I have prescribed you the antibiotic Omnicef. Take this medication as directed and complete the entire course, even if you're feeling better.  If you miss a dose, take it as soon as possible. Common side effects of this medication include diarrhea, vomiting, and rash. Notify your provider if symptoms do not improve one week after completing the course of this antibiotic. Introducing [facility]! [facility] introduces Quippi patient portal. Now you can access parts of your medical record, email your doctor's office, and request medication refills online. 1. In your internet browser, go to [email or web address] 2. Click on the First Time User? Click Here link in the Sign In box. You will see the New Member Sign Up page. 3. Enter your Quippi Access Code exactly as it appears below. You will not need to use this code after youve completed the sign-up process. If you do not sign up before the expiration date, you must request a new code. · Quippi Access Code: [email or web address] Expires: 10/18/2017  3:40 PM 
 
4. Enter the last four digits of your Social Security Number (xxxx) and Date of Birth (mm/dd/yyyy) as indicated and click Submit. You will be taken to the next sign-up page. 5. Create a Quippi ID. This will be your Quippi login ID and cannot be changed, so think of one that is secure and easy to remember. 6. Create a Quippi password. You can change your password at any time. 7. Enter your Password Reset Question and Answer. This can be used at a later time if you forget your password. 8. Enter your e-mail address. You will receive e-mail notification when new information is available in [address] 9. Click Sign Up. You can now view and download portions of your medical record. 10. Click the Download Summary menu link to download a portable copy of your medical information. If you have questions, please visit the Frequently Asked Questions section of the Quippi website. Remember, Quippi is NOT to be used for urgent needs. For medical emergencies, dial 911. Now available from your iPhone and Android! Please provide this summary of care documentation to your next provider. Your primary care clinician is listed as MIREYA PAULINO. If you have any questions after today's visit, please call [phone].

## 2017-10-17 NOTE — PROGRESS NOTES
Chief Complaint   Patient presents with    Cough    Nasal Congestion     Patient in office today for sx that began one week ago; have treated with tylenol. 1. Have you been to the ER, urgent care clinic since your last visit? Hospitalized since your last visit? No    2. Have you seen or consulted any other health care providers outside of the [address] [place] [address] since your last visit? Include any pap smears or colon screening. No    Sx started about 10 days ago with sinus congestion, head congestion, sneezing. Denies any cough. Denies any fever. Denies any sick contacts. Denies any  OTCs other than tylenol. Denies any other concerns at this time. Chief Complaint   Patient presents with    Cough    Nasal Congestion     he is a 80y.o. year old male who presents for evalution. Reviewed PmHx, RxHx, FmHx, SocHx, AllgHx and updated and dated in the chart. Review of Systems - negative except as listed above in the HPI    Objective:     Vitals:    10/17/17 1450   BP: 145/74   Pulse: 81   Resp: 16   Temp: 97.9 °F (36.6 °C)   TempSrc: Oral   SpO2: 98%   Weight: 158 lb (71.7 kg)   Height: 5' 4\" (1.626 m)     Physical Examination: General appearance - alert, well appearing, and in no distress  Eyes - pupils equal and reactive, extraocular eye movements intact  Ears - bilateral TM's and external ear canals normal, ceruminosis noted, cerumen removed  Nose - mucosal congestion, mucosal erythema, clear rhinorrhea and sinus tenderness noted bilateral maxillary sinuses  Mouth - mucous membranes moist, pharynx normal without lesions  Neck - supple, no significant adenopathy  Chest - clear to auscultation, no wheezes, rales or rhonchi, symmetric air entry, productive sounding cough  Heart - normal rate, regular rhythm, normal S1, S2, no murmurs    Assessment/ Plan:   Diagnoses and all orders for this visit:    1.  Acute maxillary sinusitis, recurrence not specified  -     cefdinir (OMNICEF) 300 mg capsule; Take 1 Cap by mouth two (2) times a day for 10 days. Start and complete full course of omnicef. Dwp ADRs/SEs of medication. Push fluids. Rest. Saline nasal spray for nasal congestion. OTC motrin/apap for fevers. RTC if sx persist or worsen. 2. Bilateral impacted cerumen  -     [place] REMOVAL IMPACTED CERUMEN IRRIGATION/LVG UNILAT  Resolved. Follow-up Disposition:  Return if symptoms worsen or fail to improve. I have discussed the diagnosis with the patient and the intended plan as seen in the above orders. The patient has received an after-visit summary and questions were answered concerning future plans. Medication Side Effects and Warnings were discussed with patient: yes  Patient Labs were reviewed and or requested: no  Patient Past Records were reviewed and or requested  yes  Patient / Caregiver Understanding of treatment plan was verbalized during office visit YES    Mandy Perez, BRIAN-C    Patient Instructions   I have prescribed you the antibiotic Omnicef. Take this medication as directed and complete the entire course, even if you're feeling better. If you miss a dose, take it as soon as possible. Common side effects of this medication include diarrhea, vomiting, and rash. Notify your provider if symptoms do not improve one week after completing the course of this antibiotic.

## 2017-10-17 NOTE — PROGRESS NOTES
Chief Complaint   Patient presents with    Cough    Nasal Congestion     Patient in office today for sx that began one week ago; have treated with tylenol. 1. Have you been to the ER, urgent care clinic since your last visit? Hospitalized since your last visit? No    2. Have you seen or consulted any other health care providers outside of the [address] [place] [address] since your last visit? Include any pap smears or colon screening.  No

## 2017-10-30 DIAGNOSIS — R11.0 NAUSEA: ICD-10-CM

## 2017-10-30 RX ORDER — ONDANSETRON 4 MG/1
4 TABLET, ORALLY DISINTEGRATING ORAL
Qty: 30 TAB | Refills: 5 | Status: SHIPPED | OUTPATIENT
Start: 2017-10-30 | End: 2018-01-16 | Stop reason: ALTCHOICE

## 2017-10-30 NOTE — TELEPHONE ENCOUNTER
Pt came into the office today to request refill, he is also requesting the quantitiy to be increased. Please call pt when done.

## 2017-11-16 ENCOUNTER — OFFICE VISIT (OUTPATIENT)
Dept: FAMILY MEDICINE CLINIC | Age: 82
End: 2017-11-16

## 2017-11-16 VITALS
WEIGHT: 161 LBS | SYSTOLIC BLOOD PRESSURE: 124 MMHG | RESPIRATION RATE: 18 BRPM | HEART RATE: 80 BPM | HEIGHT: 64 IN | OXYGEN SATURATION: 99 % | BODY MASS INDEX: 27.49 KG/M2 | TEMPERATURE: 98.6 F | DIASTOLIC BLOOD PRESSURE: 72 MMHG

## 2017-11-16 DIAGNOSIS — K57.92 ACUTE DIVERTICULITIS: Primary | ICD-10-CM

## 2017-11-16 RX ORDER — METRONIDAZOLE 500 MG/1
500 TABLET ORAL 3 TIMES DAILY
Qty: 30 TAB | Refills: 0 | Status: SHIPPED | OUTPATIENT
Start: 2017-11-16 | End: 2017-11-26

## 2017-11-16 RX ORDER — CIPROFLOXACIN 500 MG/1
500 TABLET ORAL 2 TIMES DAILY
Qty: 20 TAB | Refills: 0 | Status: SHIPPED | OUTPATIENT
Start: 2017-11-16 | End: 2017-11-26

## 2017-11-16 NOTE — MR AVS SNAPSHOT
Visit Information Date & Time Provider Department Dept. Phone Encounter #  
 11/16/2017  3:15 PM Juliettoni MartinezPanda [phone] [id number] Upcoming Health Maintenance Date Due  
 MEDICARE YEARLY EXAM 1/19/2018 GLAUCOMA SCREENING Q2Y 4/17/2019 COLONOSCOPY 10/6/2021 DTaP/Tdap/Td series (2 - Td) 8/29/2027 Allergies as of 11/16/2017  Review Complete On: 11/16/2017 By: Juliet Martienz DO Severity Noted Reaction Type Reactions Sulfa (Sulfonamide Antibiotics)  06/16/2010    Unknown (comments) Current Immunizations  Reviewed on 7/12/2017 Name Date Influenza High Dose Vaccine PF 8/29/2017, 10/17/2016 Influenza Vaccine 8/19/2015, 11/7/2013 Pneumococcal Conjugate (PCV-13) 7/8/2017, 12/8/2015 TD Vaccine 11/17/2006 Tdap 8/29/2017 ZZZ-RETIRED (DO NOT USE) Pneumococcal Vaccine (Unspecified Type) 5/19/2005 Zoster 10/1/2009 Zoster Vaccine, Live 8/2/2016 Not reviewed this visit You Were Diagnosed With   
  
 Codes Comments Acute diverticulitis    -  Primary ICD-10-CM: K57.92 
ICD-9-CM: 562.11 Vitals BP Pulse Temp Resp Height(growth percentile) Weight(growth percentile) 124/72 80 98.6 °F (37 °C) (Oral) 18 5' 4\" (1.626 m) 161 lb (73 kg) SpO2 BMI Smoking Status 99% 27.64 kg/m2 Former Smoker Vitals History BMI and BSA Data Body Mass Index Body Surface Area  
 27.64 kg/m 2 1.82 m 2 Preferred Pharmacy Pharmacy Name Phone [facility] [address] [place] [address], [address] [place] [address] [phone] Your Updated Medication List  
  
   
This list is accurate as of: 11/16/17  3:36 PM.  Always use your most recent med list.  
  
  
  
  
 aspirin delayed-release 81 mg tablet Take  by mouth daily. atorvastatin 80 mg tablet Commonly known as:  LIPITOR  
TAKE 1 TABLET BY MOUTH EVERY DAY  
  
 ciprofloxacin HCl 500 mg tablet Commonly known as:  CIPRO Take 1 Tab by mouth two (2) times a day for 10 days. dicyclomine 20 mg tablet Commonly known as:  BENTYL TAKE ONE TABLET BY MOUTH EVERY SIX HOURS  
  
 hydroCHLOROthiazide 25 mg tablet Commonly known as:  HYDRODIURIL Take 1 Tab by mouth daily for 360 days. levothyroxine 112 mcg tablet Commonly known as:  SYNTHROID  
TAKE 1 TABLET BY MOUTH DAILY BEFORE BREAKFAST  
  
 lisinopril 20 mg tablet Commonly known as:  PRINIVIL, ZESTRIL  
TAKE 1 TABLET BY MOUTH DAILY LORazepam 1 mg tablet Commonly known as:  ATIVAN  
TAKE 1 TABLET BY MOUTH EVERY 8 HOURS AS NEEDED  
  
 metroNIDAZOLE 500 mg tablet Commonly known as:  FLAGYL Take 1 Tab by mouth three (3) times daily for 10 days. omega-3s-dha-epa-fish oil-D3 360 mg-1,200 mg -1,000 unit Cap Take  by mouth. ondansetron 4 mg disintegrating tablet Commonly known as:  ZOFRAN ODT Take 1 Tab by mouth every eight (8) hours as needed for Nausea. PROBIOTIC 4X 10-15 mg Tbec Generic drug:  B.infantis-B.ani-B.long-B.bifi Take  by mouth. TYLENOL 325 mg tablet Generic drug:  acetaminophen Take  by mouth every four (4) hours as needed for Pain. Prescriptions Sent to Pharmacy Refills  
 metroNIDAZOLE (FLAGYL) 500 mg tablet 0 Sig: Take 1 Tab by mouth three (3) times daily for 10 days. Class: Normal  
 Pharmacy: [facility] [address] [place] [address] [address] [place] [address] Ph #: [phone] Route: Oral  
 ciprofloxacin HCl (CIPRO) 500 mg tablet 0 Sig: Take 1 Tab by mouth two (2) times a day for 10 days. Class: Normal  
 Pharmacy: [facility] [address] [place] [address] [address] [place] [address] E Ph #: [phone] Route: Oral  
  
Patient Instructions Diverticulitis: Care Instructions Your Care Instructions Diverticulitis occurs when pouches form in the wall of the colon and become inflamed or infected. It can be very painful. Doctors aren't sure what causes diverticulitis. There is no proof that foods such as nuts, seeds, or berries cause it or make it worse. A low-fiber diet may cause the colon to work harder to push stool forward. Pouches may form because of this extra work. It may be hard to think about healthy eating while you're in pain. But as you recover, you might think about how you can use healthy eating for overall better health. Healthy eating may help you avoid future attacks. Follow-up care is a key part of your treatment and safety. Be sure to make and go to all appointments, and call your doctor if you are having problems. It's also a good idea to know your test results and keep a list of the medicines you take. How can you care for yourself at home? · Drink plenty of fluids, enough so that your urine is light yellow or clear like water. If you have kidney, heart, or liver disease and have to limit fluids, talk with your doctor before you increase the amount of fluids you drink. · Stick to liquids or a bland diet (plain rice, bananas, dry toast or crackers, applesauce) until you are feeling better. Then you can return to regular foods and gradually increase the amount of fiber in your diet. · Use a heating pad set on low on your belly to relieve mild cramps and pain. · Get extra rest until you are feeling better. · Be safe with medicines. Read and follow all instructions on the label. ¨ If the doctor gave you a prescription medicine for pain, take it as prescribed. ¨ If you are not taking a prescription pain medicine, ask your doctor if you can take an over-the-counter medicine. · If your doctor prescribed antibiotics, take them as directed. Do not stop taking them just because you feel better. You need to take the full course of antibiotics. To prevent future attacks of diverticulitis · Avoid constipation: ¨ Include fruits, vegetables, beans, and whole grains in your diet each day. These foods are high in fiber. ¨ Drink plenty of fluids, enough so that your urine is light yellow or clear like water. If you have kidney, heart, or liver disease and have to limit fluids, talk with your doctor before you increase the amount of fluids you drink. ¨ Get some exercise every day. Build up slowly to 30 to 60 minutes a day on 5 or more days of the week. ¨ Take a fiber supplement, such as Citrucel or Metamucil, every day if needed. Read and follow all instructions on the label. ¨ Schedule time each day for a bowel movement. Having a daily routine may help. Take your time and do not strain when having a bowel movement. When should you call for help? Call your doctor now or seek immediate medical care if: 
? · You have a fever. ? · You are vomiting. ? · You have new or worse belly pain. ? · You cannot pass stools or gas. ? Watch closely for changes in your health, and be sure to contact your doctor if you have any problems. Where can you learn more? Go to http://camille-joe.info/. Enter H901 in the search box to learn more about \"Diverticulitis: Care Instructions. \" Current as of: May 12, 2017 Content Version: 11.4 © 0240-5260 [employer]. Care instructions adapted under license by [employer] (which disclaims liability or warranty for this information). If you have questions about a medical condition or this instruction, always ask your healthcare professional. [place] [address] any warranty or liability for your use of this information. Introducing [facility]! Stephany Londono introduces [employer] patient portal. Now you can access parts of your medical record, email your doctor's office, and request medication refills online. 1. In your internet browser, go to [email or web address] 2. Click on the First Time User? Click Here link in the Sign In box. You will see the New Member Sign Up page. 3. Enter your [employer] Access Code exactly as it appears below. You will not need to use this code after youve completed the sign-up process. If you do not sign up before the expiration date, you must request a new code. · [employer] Access Code: [email or web address] Expires: 2/14/2018  2:53 PM 
 
4. Enter the last four digits of your Social Security Number (xxxx) and Date of Birth (mm/dd/yyyy) as indicated and click Submit. You will be taken to the next sign-up page. 5. Create a [employer] ID. This will be your [employer] login ID and cannot be changed, so think of one that is secure and easy to remember. 6. Create a [employer] password. You can change your password at any time. 7. Enter your Password Reset Question and Answer. This can be used at a later time if you forget your password. 8. Enter your e-mail address. You will receive e-mail notification when new information is available in [address] 9. Click Sign Up. You can now view and download portions of your medical record. 10. Click the Download Summary menu link to download a portable copy of your medical information. If you have questions, please visit the Frequently Asked Questions section of the [employer] website. Remember, [employer] is NOT to be used for urgent needs. For medical emergencies, dial 911. Now available from your iPhone and Android! Please provide this summary of care documentation to your next provider. Your primary care clinician is listed as MIREYA PAULINO. If you have any questions after today's visit, please call [phone].

## 2017-11-16 NOTE — PROGRESS NOTES
Eric Gray is a 80 y.o. male   Chief Complaint   Patient presents with    Abdominal Pain    pt states he has had stomach issues most of his adult life and has bloating and reflux. Uses maalox with some relief. Pt has never been tested for H Pylori and haf prev been using probiotics with relief. Pt has not been using any ppi's. Pt is having pain in LLQ and has a hx of diverticulitis. No fever no chills  he is a 80y.o. year old male who presents for evalution. Reviewed PmHx, RxHx, FmHx, SocHx, AllgHx and updated and dated in the chart. Review of Systems - negative except as listed above in the HPI    Objective:     Vitals:    11/16/17 1517   BP: 124/72   Pulse: 80   Resp: 18   Temp: 98.6 °F (37 °C)   TempSrc: Oral   SpO2: 99%   Weight: 161 lb (73 kg)   Height: 5' 4\" (1.626 m)       Current Outpatient Prescriptions   Medication Sig    metroNIDAZOLE (FLAGYL) 500 mg tablet Take 1 Tab by mouth three (3) times daily for 10 days.  ciprofloxacin HCl (CIPRO) 500 mg tablet Take 1 Tab by mouth two (2) times a day for 10 days.  lisinopril (PRINIVIL, ZESTRIL) 20 mg tablet TAKE 1 TABLET BY MOUTH DAILY    atorvastatin (LIPITOR) 80 mg tablet TAKE 1 TABLET BY MOUTH EVERY DAY    levothyroxine (SYNTHROID) 112 mcg tablet TAKE 1 TABLET BY MOUTH DAILY BEFORE BREAKFAST    B.infantis-B.ani-B.long-B.bifi (PROBIOTIC 4X) 10-15 mg TbEC Take  by mouth.  omega-3s-dha-epa-fish oil-D3 360 mg-1,200 mg -1,000 unit cap Take  by mouth.  ondansetron (ZOFRAN ODT) 4 mg disintegrating tablet Take 1 Tab by mouth every eight (8) hours as needed for Nausea.  LORazepam (ATIVAN) 1 mg tablet TAKE 1 TABLET BY MOUTH EVERY 8 HOURS AS NEEDED    dicyclomine (BENTYL) 20 mg tablet TAKE ONE TABLET BY MOUTH EVERY SIX HOURS    hydroCHLOROthiazide (HYDRODIURIL) 25 mg tablet Take 1 Tab by mouth daily for 360 days.  aspirin delayed-release 81 mg tablet Take  by mouth daily.     acetaminophen (TYLENOL) 325 mg tablet Take  by mouth every four (4) hours as needed for Pain. No current facility-administered medications for this visit. Physical Examination: General appearance - alert, well appearing, and in no distress  Eyes - pupils equal and reactive, extraocular eye movements intact  Chest - clear to auscultation, no wheezes, rales or rhonchi, symmetric air entry  Heart - normal rate, regular rhythm, normal S1, S2, no murmurs, rubs, clicks or gallops  Abdomen - tenderness noted LLQ      Assessment/ Plan:   Diagnoses and all orders for this visit:    1. Acute diverticulitis  -     metroNIDAZOLE (FLAGYL) 500 mg tablet; Take 1 Tab by mouth three (3) times daily for 10 days. -     ciprofloxacin HCl (CIPRO) 500 mg tablet; Take 1 Tab by mouth two (2) times a day for 10 days. Follow-up Disposition:  Return if symptoms worsen or fail to improve. I have discussed the diagnosis with the patient and the intended plan as seen in the above orders. The patient has received an after-visit summary and questions were answered concerning future plans. Pt conveyed understanding of plan.     Medication Side Effects and Warnings were discussed with patient      Jenny Romo, DO

## 2017-11-16 NOTE — PATIENT INSTRUCTIONS
Diverticulitis: Care Instructions  Your Care Instructions    Diverticulitis occurs when pouches form in the wall of the colon and become inflamed or infected. It can be very painful. Doctors aren't sure what causes diverticulitis. There is no proof that foods such as nuts, seeds, or berries cause it or make it worse. A low-fiber diet may cause the colon to work harder to push stool forward. Pouches may form because of this extra work. It may be hard to think about healthy eating while you're in pain. But as you recover, you might think about how you can use healthy eating for overall better health. Healthy eating may help you avoid future attacks. Follow-up care is a key part of your treatment and safety. Be sure to make and go to all appointments, and call your doctor if you are having problems. It's also a good idea to know your test results and keep a list of the medicines you take. How can you care for yourself at home? · Drink plenty of fluids, enough so that your urine is light yellow or clear like water. If you have kidney, heart, or liver disease and have to limit fluids, talk with your doctor before you increase the amount of fluids you drink. · Stick to liquids or a bland diet (plain rice, bananas, dry toast or crackers, applesauce) until you are feeling better. Then you can return to regular foods and gradually increase the amount of fiber in your diet. · Use a heating pad set on low on your belly to relieve mild cramps and pain. · Get extra rest until you are feeling better. · Be safe with medicines. Read and follow all instructions on the label. ¨ If the doctor gave you a prescription medicine for pain, take it as prescribed. ¨ If you are not taking a prescription pain medicine, ask your doctor if you can take an over-the-counter medicine. · If your doctor prescribed antibiotics, take them as directed. Do not stop taking them just because you feel better.  You need to take the full course of antibiotics. To prevent future attacks of diverticulitis  · Avoid constipation:  ¨ Include fruits, vegetables, beans, and whole grains in your diet each day. These foods are high in fiber. ¨ Drink plenty of fluids, enough so that your urine is light yellow or clear like water. If you have kidney, heart, or liver disease and have to limit fluids, talk with your doctor before you increase the amount of fluids you drink. ¨ Get some exercise every day. Build up slowly to 30 to 60 minutes a day on 5 or more days of the week. ¨ Take a fiber supplement, such as Citrucel or Metamucil, every day if needed. Read and follow all instructions on the label. ¨ Schedule time each day for a bowel movement. Having a daily routine may help. Take your time and do not strain when having a bowel movement. When should you call for help? Call your doctor now or seek immediate medical care if:  ? · You have a fever. ? · You are vomiting. ? · You have new or worse belly pain. ? · You cannot pass stools or gas. ? Watch closely for changes in your health, and be sure to contact your doctor if you have any problems. Where can you learn more? Go to http://camille-joe.info/. Enter H901 in the search box to learn more about \"Diverticulitis: Care Instructions. \"  Current as of: May 12, 2017  Content Version: 11.4  © 8157-6739 [employer]. Care instructions adapted under license by [employer] (which disclaims liability or warranty for this information). If you have questions about a medical condition or this instruction, always ask your healthcare professional. [place] [address] any warranty or liability for your use of this information.

## 2017-11-22 DIAGNOSIS — E03.9 ACQUIRED HYPOTHYROIDISM: ICD-10-CM

## 2017-11-22 RX ORDER — DICYCLOMINE HYDROCHLORIDE 20 MG/1
TABLET ORAL
Qty: 270 TAB | Refills: 0 | Status: SHIPPED | OUTPATIENT
Start: 2017-11-22 | End: 2018-01-16 | Stop reason: ALTCHOICE

## 2017-11-22 RX ORDER — LEVOTHYROXINE SODIUM 112 UG/1
TABLET ORAL
Qty: 90 TAB | Refills: 0 | Status: SHIPPED | OUTPATIENT
Start: 2017-11-22 | End: 2018-02-17 | Stop reason: SDUPTHER

## 2017-11-27 RX ORDER — ATORVASTATIN CALCIUM 80 MG/1
TABLET, FILM COATED ORAL
Qty: 90 TAB | Refills: 0 | Status: SHIPPED | OUTPATIENT
Start: 2017-11-27 | End: 2019-07-25

## 2017-11-30 ENCOUNTER — OFFICE VISIT (OUTPATIENT)
Dept: FAMILY MEDICINE CLINIC | Age: 82
End: 2017-11-30

## 2017-11-30 VITALS
OXYGEN SATURATION: 98 % | DIASTOLIC BLOOD PRESSURE: 73 MMHG | WEIGHT: 157 LBS | HEIGHT: 64 IN | HEART RATE: 84 BPM | TEMPERATURE: 97.7 F | BODY MASS INDEX: 26.8 KG/M2 | RESPIRATION RATE: 16 BRPM | SYSTOLIC BLOOD PRESSURE: 144 MMHG

## 2017-11-30 DIAGNOSIS — F33.8 SEASONAL AFFECTIVE DISORDER (HCC): ICD-10-CM

## 2017-11-30 DIAGNOSIS — R14.0 BLOATING: ICD-10-CM

## 2017-11-30 DIAGNOSIS — F43.21 SITUATIONAL DEPRESSION: ICD-10-CM

## 2017-11-30 DIAGNOSIS — F33.8 SEASONAL AFFECTIVE DISORDER (HCC): Primary | ICD-10-CM

## 2017-11-30 RX ORDER — PAROXETINE HYDROCHLORIDE 20 MG/1
20 TABLET, FILM COATED ORAL
Qty: 30 TAB | Refills: 2 | Status: SHIPPED | OUTPATIENT
Start: 2017-11-30 | End: 2017-11-30 | Stop reason: SDUPTHER

## 2017-11-30 RX ORDER — SAME BUTANEDISULFONATE/BETAINE 400-600 MG
250 POWDER IN PACKET (EA) ORAL 2 TIMES DAILY
Qty: 14 CAP | Refills: 0 | Status: SHIPPED | OUTPATIENT
Start: 2017-11-30 | End: 2017-12-07

## 2017-11-30 RX ORDER — PAROXETINE HYDROCHLORIDE 20 MG/1
TABLET, FILM COATED ORAL
Qty: 90 TAB | Refills: 2 | Status: SHIPPED | OUTPATIENT
Start: 2017-11-30 | End: 2018-01-16 | Stop reason: ALTCHOICE

## 2017-11-30 NOTE — PROGRESS NOTES
Chief Complaint   Patient presents with    Abdominal Pain    Bloated     Patient in office for sx that have been present since lov;   have been treating with dicyclomine with relief noted. 1. Have you been to the ER, urgent care clinic since your last visit? Hospitalized since your last visit? No    2. Have you seen or consulted any other health care providers outside of the [address] [place] [address] since your last visit? Include any pap smears or colon screening.  No

## 2017-11-30 NOTE — MR AVS SNAPSHOT
Visit Information Date & Time Provider Department Dept. Phone Encounter #  
 11/30/2017  9:45 AM Jamar Vzaquez NP [address] [facility] [phone] [id number] Follow-up Instructions Return in about 6 weeks (around 1/11/2018). Upcoming Health Maintenance Date Due  
 MEDICARE YEARLY EXAM 1/19/2018 GLAUCOMA SCREENING Q2Y 4/17/2019 COLONOSCOPY 10/6/2021 DTaP/Tdap/Td series (2 - Td) 8/29/2027 Allergies as of 11/30/2017  Review Complete On: 11/30/2017 By: Jamar Vazquez NP Severity Noted Reaction Type Reactions Sulfa (Sulfonamide Antibiotics)  06/16/2010    Unknown (comments) Current Immunizations  Reviewed on 7/12/2017 Name Date Influenza High Dose Vaccine PF 8/29/2017, 10/17/2016 Influenza Vaccine 8/19/2015, 11/7/2013 Pneumococcal Conjugate (PCV-13) 7/8/2017, 12/8/2015 TD Vaccine 11/17/2006 Tdap 8/29/2017 ZZZ-RETIRED (DO NOT USE) Pneumococcal Vaccine (Unspecified Type) 5/19/2005 Zoster 10/1/2009 Zoster Vaccine, Live 8/2/2016 Not reviewed this visit You Were Diagnosed With   
  
 Codes Comments Seasonal affective disorder ([facility] 75.)    -  Primary ICD-10-CM: F33.9 ICD-9-CM: 296.99 Situational depression     ICD-10-CM: S73.25 ICD-9-CM: 309.0 Bloating     ICD-10-CM: R14.0 ICD-9-CM: 384. 3 Vitals BP Pulse Temp Resp Height(growth percentile) Weight(growth percentile) 144/73 (BP 1 Location: Right arm, BP Patient Position: Sitting) 84 97.7 °F (36.5 °C) (Oral) 16 5' 4\" (1.626 m) 157 lb (71.2 kg) SpO2 BMI Smoking Status 98% 26.95 kg/m2 Former Smoker Vitals History BMI and BSA Data Body Mass Index Body Surface Area  
 26.95 kg/m 2 1.79 m 2 Preferred Pharmacy Pharmacy Name Phone [facility] [address] [place] [address], [address] [place] [address] [phone] Your Updated Medication List  
  
   
 This list is accurate as of: 11/30/17 10:19 AM.  Always use your most recent med list.  
  
  
  
  
 aspirin delayed-release 81 mg tablet Take  by mouth daily. atorvastatin 80 mg tablet Commonly known as:  LIPITOR  
TAKE 1 TABLET BY MOUTH EVERY DAY  
  
 dicyclomine 20 mg tablet Commonly known as:  BENTYL TAKE ONE TABLET BY MOUTH EVERY SIX HOURS  
  
 levothyroxine 112 mcg tablet Commonly known as:  SYNTHROID  
TAKE 1 TABLET BY MOUTH DAILY BEFORE BREAKFAST  
  
 lisinopril 20 mg tablet Commonly known as:  PRINIVIL, ZESTRIL  
TAKE 1 TABLET BY MOUTH DAILY LORazepam 1 mg tablet Commonly known as:  ATIVAN  
TAKE 1 TABLET BY MOUTH EVERY 8 HOURS AS NEEDED  
  
 omega-3s-dha-epa-fish oil-D3 360 mg-1,200 mg -1,000 unit Cap Take  by mouth. ondansetron 4 mg disintegrating tablet Commonly known as:  ZOFRAN ODT Take 1 Tab by mouth every eight (8) hours as needed for Nausea. PARoxetine 20 mg tablet Commonly known as:  PAXIL Take 1 Tab by mouth nightly. PROBIOTIC 4X 10-15 mg Tbec Generic drug:  B.infantis-B.ani-B.long-B.bifi Take  by mouth. Saccharomyces boulardii 250 mg capsule Commonly known as:  T[employer] Take 1 Cap by mouth two (2) times a day for 7 days. TYLENOL 325 mg tablet Generic drug:  acetaminophen Take  by mouth every four (4) hours as needed for Pain. Prescriptions Sent to Pharmacy Refills PARoxetine (PAXIL) 20 mg tablet 2 Sig: Take 1 Tab by mouth nightly. Class: Normal  
 Pharmacy: [address] [place] [address] E Ph #: [phone] Route: Oral  
 Saccharomyces boulardii (FLORASTOR) 250 mg capsule 0 Sig: Take 1 Cap by mouth two (2) times a day for 7 days. Class: Normal  
 Pharmacy: [address] [place] [address] E Ph #: [phone]  Route: Oral  
  
 Follow-up Instructions Return in about 6 weeks (around 1/11/2018). Patient Instructions Paroxetine (By mouth) Paroxetine ([id number]-SN-u-teen) Treats depression, anxiety disorders, obsessive-compulsive disorder (OCD), and premenstrual dysphoric disorder (PMDD). Brisdelle treats hot flashes caused by menopause. This medicine is an SSRI. Brand Name(s): Brisdelle, Paxil, Paxil CR, Monie Mayito There may be other brand names for this medicine. When This Medicine Should Not Be Used: This medicine is not right for everyone. Do not use it if you had an allergic reaction to paroxetine, or if you are pregnant. How to Use This Medicine:  
Capsule, Liquid, Tablet, Long Acting Tablet · Take your medicine as directed. Your dose may need to be changed several times to find what works best for you. · Oral liquid, Tablet, Extended-release Tablet: These are usually taken in the morning. · Brisdelle capsule: Take at bedtime. · Oral liquid: Measure the oral liquid medicine with a marked measuring spoon, oral syringe, or medicine cup. Shake the bottle well just before you measure each dose. · Tablet or Extended-release Tablet: Swallow whole. Do not crush, break, or chew it. Do not use an extended-release tablet that is cracked or chipped. · You may need to take this medicine for a month or longer before you feel better. If you feel that the medicine is not working well, do not take more than your normal dose. Call your doctor for instructions. · This medicine should come with a Medication Guide. Ask your pharmacist for a copy if you do not have one. · Missed dose: Take a dose as soon as you remember. If it is almost time for your next dose, wait until then and take a regular dose. Do not take extra medicine to make up for a missed dose. · Store the medicine in a closed container at room temperature, away from heat, moisture, and direct light. Drugs and Foods to Avoid: Ask your doctor or pharmacist before using any other medicine, including over-the-counter medicines, vitamins, and herbal products. · Do not use paroxetine and an MAO inhibitor within 14 days of each other. Do not use this medicine if you are using pimozide or thioridazine. · Some medicines can affect how paroxetine works. Tell your doctor if you are using any of the following: 
¨ Buspirone, cimetidine, digoxin, fentanyl, fosamprenavir/ritonavir, lithium, procyclidine, risperidone, Suha's wort, tamoxifen, theophylline, tramadol, or tryptophan supplements ¨ Amphetamines ¨ Blood thinner (including warfarin) ¨ Diuretic (water pill) ¨ Medicine for heart rhythm problems ¨ NSAID pain or arthritis medicine (including aspirin, celecoxib, diclofenac, ibuprofen, naproxen) ¨ Other medicine for depression or anxiety ¨ Phenothiazine medicine (including chlorpromazine, perphenazine, prochlorperazine, promethazine) ¨ Triptan medicine for migraine headaches · Do not drink alcohol while you are using this medicine. Warnings While Using This Medicine: · It is not safe to take this medicine during pregnancy. It could harm an unborn baby. Tell your doctor right away if you become pregnant. · Tell your doctor if you are breastfeeding, or if you have kidney disease, liver disease, glaucoma, or a history of epilepsy or seizures. · For some children, teenagers, and young adults, this medicine may increase mental or emotional problems. This may lead to thoughts of suicide and violence. Talk with your doctor right away if you have any thoughts or behavior changes that concern you. Tell your doctor if you or anyone in your family has a history of bipolar disorder or suicide attempts. · This medicine may cause the following problems: 
¨ Serotonin syndrome (may be life-threatening when used with certain other medicines) ¨ Low sodium levels in the blood ¨ Higher risk of bleeding problems ¨ Higher risk of broken bones · Do not stop using this medicine suddenly. Your doctor will need to slowly decrease your dose before you stop it completely. · This medicine may make you dizzy or drowsy. Do not drive or do anything that could be dangerous until you know how this medicine affects you. · Keep all medicine out of the reach of children. Never share your medicine with anyone. Possible Side Effects While Using This Medicine:  
Call your doctor right away if you notice any of these side effects: · Allergic reaction: Itching or hives, swelling in your face or hands, swelling or tingling in your mouth or throat, chest tightness, trouble breathing · Anxiety, restlessness, fast heartbeat, fever, sweating, muscle spasms, nausea, vomiting, diarrhea, seeing or hearing things that are not there · Bone pain, tenderness, swelling, or bruising · Changes in behavior, thoughts of hurting yourself or others · Confusion, weakness, and muscle twitching · Eye pain, vision changes, seeing halos around lights · Trouble keeping still, feeling restless and agitated, racing thoughts, excessive energy, trouble sleeping · Unusual bleeding or bruising If you notice these less serious side effects, talk with your doctor: · Blurred vision, dizziness, drowsiness, or sleepiness · Constipation, upset stomach, loss of appetite, weight loss · Dry mouth · Sexual problems If you notice other side effects that you think are caused by this medicine, tell your doctor. Call your doctor for medical advice about side effects. You may report side effects to FDA at 7-704-FDA-5153 © 2017 [address] Information is for End User's use only and may not be sold, redistributed or otherwise used for commercial purposes. The above information is an  only. It is not intended as medical advice for individual conditions or treatments.  Talk to your doctor, nurse or pharmacist before following any medical regimen to see if it is safe and effective for you. Introducing [facility]! Carson Stephens introduces [employer] patient portal. Now you can access parts of your medical record, email your doctor's office, and request medication refills online. 1. In your internet browser, go to [email or web address] 2. Click on the First Time User? Click Here link in the Sign In box. You will see the New Member Sign Up page. 3. Enter your [employer] Access Code exactly as it appears below. You will not need to use this code after youve completed the sign-up process. If you do not sign up before the expiration date, you must request a new code. · [employer] Access Code: [email or web address] Expires: 2/14/2018  2:53 PM 
 
4. Enter the last four digits of your Social Security Number (xxxx) and Date of Birth (mm/dd/yyyy) as indicated and click Submit. You will be taken to the next sign-up page. 5. Create a [employer] ID. This will be your [employer] login ID and cannot be changed, so think of one that is secure and easy to remember. 6. Create a [employer] password. You can change your password at any time. 7. Enter your Password Reset Question and Answer. This can be used at a later time if you forget your password. 8. Enter your e-mail address. You will receive e-mail notification when new information is available in [address] 9. Click Sign Up. You can now view and download portions of your medical record. 10. Click the Download Summary menu link to download a portable copy of your medical information. If you have questions, please visit the Frequently Asked Questions section of the [employer] website. Remember, [employer] is NOT to be used for urgent needs. For medical emergencies, dial 911. Now available from your iPhone and Android! Please provide this summary of care documentation to your next provider. Your primary care clinician is listed as MIREYA PAULINO.  If you have any questions after today's visit, please call [phone].

## 2017-11-30 NOTE — PATIENT INSTRUCTIONS
Paroxetine (By mouth)   Paroxetine (uzn-XQ-a-teen)  Treats depression, anxiety disorders, obsessive-compulsive disorder (OCD), and premenstrual dysphoric disorder (PMDD). Brisdelle treats hot flashes caused by menopause. This medicine is an SSRI. Brand Name(s): Brisdelle, Paxil, Paxil CR, Pexeva   There may be other brand names for this medicine. When This Medicine Should Not Be Used: This medicine is not right for everyone. Do not use it if you had an allergic reaction to paroxetine, or if you are pregnant. How to Use This Medicine:   Capsule, Liquid, Tablet, Long Acting Tablet  · Take your medicine as directed. Your dose may need to be changed several times to find what works best for you. · Oral liquid, Tablet, Extended-release Tablet: These are usually taken in the morning. · Brisdelle capsule: Take at bedtime. · Oral liquid: Measure the oral liquid medicine with a marked measuring spoon, oral syringe, or medicine cup. Shake the bottle well just before you measure each dose. · Tablet or Extended-release Tablet: Swallow whole. Do not crush, break, or chew it. Do not use an extended-release tablet that is cracked or chipped. · You may need to take this medicine for a month or longer before you feel better. If you feel that the medicine is not working well, do not take more than your normal dose. Call your doctor for instructions. · This medicine should come with a Medication Guide. Ask your pharmacist for a copy if you do not have one. · Missed dose: Take a dose as soon as you remember. If it is almost time for your next dose, wait until then and take a regular dose. Do not take extra medicine to make up for a missed dose. · Store the medicine in a closed container at room temperature, away from heat, moisture, and direct light. Drugs and Foods to Avoid:   Ask your doctor or pharmacist before using any other medicine, including over-the-counter medicines, vitamins, and herbal products.   · Do not use paroxetine and an MAO inhibitor within 14 days of each other. Do not use this medicine if you are using pimozide or thioridazine. · Some medicines can affect how paroxetine works. Tell your doctor if you are using any of the following:  ¨ Buspirone, cimetidine, digoxin, fentanyl, fosamprenavir/ritonavir, lithium, procyclidine, risperidone, Suha's wort, tamoxifen, theophylline, tramadol, or tryptophan supplements  ¨ Amphetamines  ¨ Blood thinner (including warfarin)  ¨ Diuretic (water pill)  ¨ Medicine for heart rhythm problems  ¨ NSAID pain or arthritis medicine (including aspirin, celecoxib, diclofenac, ibuprofen, naproxen)  ¨ Other medicine for depression or anxiety  ¨ Phenothiazine medicine (including chlorpromazine, perphenazine, prochlorperazine, promethazine)  ¨ Triptan medicine for migraine headaches  · Do not drink alcohol while you are using this medicine. Warnings While Using This Medicine:   · It is not safe to take this medicine during pregnancy. It could harm an unborn baby. Tell your doctor right away if you become pregnant. · Tell your doctor if you are breastfeeding, or if you have kidney disease, liver disease, glaucoma, or a history of epilepsy or seizures. · For some children, teenagers, and young adults, this medicine may increase mental or emotional problems. This may lead to thoughts of suicide and violence. Talk with your doctor right away if you have any thoughts or behavior changes that concern you. Tell your doctor if you or anyone in your family has a history of bipolar disorder or suicide attempts. · This medicine may cause the following problems:  ¨ Serotonin syndrome (may be life-threatening when used with certain other medicines)  ¨ Low sodium levels in the blood  ¨ Higher risk of bleeding problems  ¨ Higher risk of broken bones  · Do not stop using this medicine suddenly. Your doctor will need to slowly decrease your dose before you stop it completely.   · This medicine may make you dizzy or drowsy. Do not drive or do anything that could be dangerous until you know how this medicine affects you. · Keep all medicine out of the reach of children. Never share your medicine with anyone. Possible Side Effects While Using This Medicine:   Call your doctor right away if you notice any of these side effects:  · Allergic reaction: Itching or hives, swelling in your face or hands, swelling or tingling in your mouth or throat, chest tightness, trouble breathing  · Anxiety, restlessness, fast heartbeat, fever, sweating, muscle spasms, nausea, vomiting, diarrhea, seeing or hearing things that are not there  · Bone pain, tenderness, swelling, or bruising  · Changes in behavior, thoughts of hurting yourself or others  · Confusion, weakness, and muscle twitching  · Eye pain, vision changes, seeing halos around lights  · Trouble keeping still, feeling restless and agitated, racing thoughts, excessive energy, trouble sleeping  · Unusual bleeding or bruising  If you notice these less serious side effects, talk with your doctor:   · Blurred vision, dizziness, drowsiness, or sleepiness  · Constipation, upset stomach, loss of appetite, weight loss  · Dry mouth  · Sexual problems  If you notice other side effects that you think are caused by this medicine, tell your doctor. Call your doctor for medical advice about side effects. You may report side effects to FDA at 6-705-FDA-2882  © 2017 [address] Information is for End User's use only and may not be sold, redistributed or otherwise used for commercial purposes. The above information is an  only. It is not intended as medical advice for individual conditions or treatments. Talk to your doctor, nurse or pharmacist before following any medical regimen to see if it is safe and effective for you.

## 2017-11-30 NOTE — PROGRESS NOTES
Chief Complaint   Patient presents with    Abdominal Pain    Bloated     Patient in office for sx that have been present since lov;   Have been treating with dicyclomine with relief noted. Pt thinks that his problem could be r/t situational depression as we approach marian. This is the second marian without his wife. Having problems with poor sleep. Pt complete abx prescribed by Dr. Ben Walsh. Continues to have bloating intermittently, worse after eating. Also has nausea. Relieved by bentyl PRN. Denies any vomiting. Denies any diarrhea. Denies any blood in the stool. Has been constipated some. Has an appt with Dr. Kinsey Burkett middle of next month. Denies any other concerns at this time. Chief Complaint   Patient presents with    Abdominal Pain   Arelis Peres     he is a 80y.o. year old male who presents for evalution. Reviewed PmHx, RxHx, FmHx, SocHx, AllgHx and updated and dated in the chart. Review of Systems - negative except as listed above in the HPI    Objective:     Vitals:    11/30/17 0943   BP: 144/73   Pulse: 84   Resp: 16   Temp: 97.7 °F (36.5 °C)   TempSrc: Oral   SpO2: 98%   Weight: 157 lb (71.2 kg)   Height: 5' 4\" (1.626 m)     Physical Examination: General appearance - alert, well appearing, and in no distress  Mental status - depressed mood but redirectable  Chest - clear to auscultation, no wheezes, rales or rhonchi, symmetric air entry  Heart - normal rate, regular rhythm, normal S1, S2, no murmurs  Abdomen - soft, nontender, nondistended  bowel sounds normal    Assessment/ Plan:   Diagnoses and all orders for this visit:    1. Seasonal affective disorder ([address] / 2. Situational depression  Start paxil every evening. Reviewed SEs/ADRs of medication. Enc pt to follow up if sx persist or worsen or if medication SEs intolerable. 3. Bloating  -     Saccharomyces boulardii (FLORASTOR) 250 mg capsule; Take 1 Cap by mouth two (2) times a day for 7 days.   Enc pt to take as directed. Continue bentyl as needed for persistent sx. Continue with plan to see Dr. Pal William in 2 weeks. Follow-up Disposition:  Return in about 6 weeks (around 1/11/2018). I have discussed the diagnosis with the patient and the intended plan as seen in the above orders. The patient has received an after-visit summary and questions were answered concerning future plans. Medication Side Effects and Warnings were discussed with patient: yes  Patient Labs were reviewed and or requested: no  Patient Past Records were reviewed and or requested  yes  Patient / Caregiver Understanding of treatment plan was verbalized during office visit YES    FABIÁN Byers    Patient Instructions   Paroxetine (By mouth)   Paroxetine (god-GA-b-teen)  Treats depression, anxiety disorders, obsessive-compulsive disorder (OCD), and premenstrual dysphoric disorder (PMDD). Brisdelle treats hot flashes caused by menopause. This medicine is an SSRI. Brand Name(s): Brisdelle, Paxil, Paxil CR, Pexeva   There may be other brand names for this medicine. When This Medicine Should Not Be Used: This medicine is not right for everyone. Do not use it if you had an allergic reaction to paroxetine, or if you are pregnant. How to Use This Medicine:   Capsule, Liquid, Tablet, Long Acting Tablet  · Take your medicine as directed. Your dose may need to be changed several times to find what works best for you. · Oral liquid, Tablet, Extended-release Tablet: These are usually taken in the morning. · Brisdelle capsule: Take at bedtime. · Oral liquid: Measure the oral liquid medicine with a marked measuring spoon, oral syringe, or medicine cup. Shake the bottle well just before you measure each dose. · Tablet or Extended-release Tablet: Swallow whole. Do not crush, break, or chew it. Do not use an extended-release tablet that is cracked or chipped. · You may need to take this medicine for a month or longer before you feel better.  If you feel that the medicine is not working well, do not take more than your normal dose. Call your doctor for instructions. · This medicine should come with a Medication Guide. Ask your pharmacist for a copy if you do not have one. · Missed dose: Take a dose as soon as you remember. If it is almost time for your next dose, wait until then and take a regular dose. Do not take extra medicine to make up for a missed dose. · Store the medicine in a closed container at room temperature, away from heat, moisture, and direct light. Drugs and Foods to Avoid:   Ask your doctor or pharmacist before using any other medicine, including over-the-counter medicines, vitamins, and herbal products. · Do not use paroxetine and an MAO inhibitor within 14 days of each other. Do not use this medicine if you are using pimozide or thioridazine. · Some medicines can affect how paroxetine works. Tell your doctor if you are using any of the following:  ¨ Buspirone, cimetidine, digoxin, fentanyl, fosamprenavir/ritonavir, lithium, procyclidine, risperidone, Suha's wort, tamoxifen, theophylline, tramadol, or tryptophan supplements  ¨ Amphetamines  ¨ Blood thinner (including warfarin)  ¨ Diuretic (water pill)  ¨ Medicine for heart rhythm problems  ¨ NSAID pain or arthritis medicine (including aspirin, celecoxib, diclofenac, ibuprofen, naproxen)  ¨ Other medicine for depression or anxiety  ¨ Phenothiazine medicine (including chlorpromazine, perphenazine, prochlorperazine, promethazine)  ¨ Triptan medicine for migraine headaches  · Do not drink alcohol while you are using this medicine. Warnings While Using This Medicine:   · It is not safe to take this medicine during pregnancy. It could harm an unborn baby. Tell your doctor right away if you become pregnant. · Tell your doctor if you are breastfeeding, or if you have kidney disease, liver disease, glaucoma, or a history of epilepsy or seizures.   · For some children, teenagers, and young adults, this medicine may increase mental or emotional problems. This may lead to thoughts of suicide and violence. Talk with your doctor right away if you have any thoughts or behavior changes that concern you. Tell your doctor if you or anyone in your family has a history of bipolar disorder or suicide attempts. · This medicine may cause the following problems:  ¨ Serotonin syndrome (may be life-threatening when used with certain other medicines)  ¨ Low sodium levels in the blood  ¨ Higher risk of bleeding problems  ¨ Higher risk of broken bones  · Do not stop using this medicine suddenly. Your doctor will need to slowly decrease your dose before you stop it completely. · This medicine may make you dizzy or drowsy. Do not drive or do anything that could be dangerous until you know how this medicine affects you. · Keep all medicine out of the reach of children. Never share your medicine with anyone.   Possible Side Effects While Using This Medicine:   Call your doctor right away if you notice any of these side effects:  · Allergic reaction: Itching or hives, swelling in your face or hands, swelling or tingling in your mouth or throat, chest tightness, trouble breathing  · Anxiety, restlessness, fast heartbeat, fever, sweating, muscle spasms, nausea, vomiting, diarrhea, seeing or hearing things that are not there  · Bone pain, tenderness, swelling, or bruising  · Changes in behavior, thoughts of hurting yourself or others  · Confusion, weakness, and muscle twitching  · Eye pain, vision changes, seeing halos around lights  · Trouble keeping still, feeling restless and agitated, racing thoughts, excessive energy, trouble sleeping  · Unusual bleeding or bruising  If you notice these less serious side effects, talk with your doctor:   · Blurred vision, dizziness, drowsiness, or sleepiness  · Constipation, upset stomach, loss of appetite, weight loss  · Dry mouth  · Sexual problems  If you notice other side effects that you think are caused by this medicine, tell your doctor. Call your doctor for medical advice about side effects. You may report side effects to FDA at 7-795-OLT-5473  © 2017 [address] [employer] Information is for End User's use only and may not be sold, redistributed or otherwise used for commercial purposes. The above information is an  only. It is not intended as medical advice for individual conditions or treatments. Talk to your doctor, nurse or pharmacist before following any medical regimen to see if it is safe and effective for you.

## 2017-12-05 ENCOUNTER — HOSPITAL ENCOUNTER (OUTPATIENT)
Dept: LAB | Age: 82
Discharge: HOME | End: 2017-12-05
Payer: MEDICARE

## 2017-12-05 ENCOUNTER — OFFICE VISIT (OUTPATIENT)
Dept: FAMILY MEDICINE CLINIC | Age: 82
End: 2017-12-05

## 2017-12-05 VITALS
RESPIRATION RATE: 18 BRPM | BODY MASS INDEX: 26.8 KG/M2 | WEIGHT: 157 LBS | HEIGHT: 64 IN | HEART RATE: 76 BPM | DIASTOLIC BLOOD PRESSURE: 71 MMHG | OXYGEN SATURATION: 98 % | SYSTOLIC BLOOD PRESSURE: 141 MMHG | TEMPERATURE: 98.3 F

## 2017-12-05 DIAGNOSIS — K57.20 DIVERTICULITIS OF LARGE INTESTINE WITH PERFORATION AND ABSCESS WITHOUT BLEEDING: ICD-10-CM

## 2017-12-05 DIAGNOSIS — I10 PRIMARY HYPERTENSION: ICD-10-CM

## 2017-12-05 DIAGNOSIS — E78.2 MIXED HYPERLIPIDEMIA: ICD-10-CM

## 2017-12-05 DIAGNOSIS — C80.1 CANCER (HCC): ICD-10-CM

## 2017-12-05 DIAGNOSIS — K55.9 ISCHEMIC COLITIS (HCC): ICD-10-CM

## 2017-12-05 DIAGNOSIS — Z71.89 ADVANCE CARE PLANNING: ICD-10-CM

## 2017-12-05 DIAGNOSIS — Z00.00 ROUTINE GENERAL MEDICAL EXAMINATION AT A HEALTH CARE FACILITY: Primary | ICD-10-CM

## 2017-12-05 DIAGNOSIS — E03.9 ACQUIRED HYPOTHYROIDISM: ICD-10-CM

## 2017-12-05 PROCEDURE — 80053 COMPREHEN METABOLIC PANEL: CPT

## 2017-12-05 PROCEDURE — 80061 LIPID PANEL: CPT

## 2017-12-05 PROCEDURE — 84443 ASSAY THYROID STIM HORMONE: CPT

## 2017-12-05 PROCEDURE — 84153 ASSAY OF PSA TOTAL: CPT

## 2017-12-05 PROCEDURE — 85025 COMPLETE CBC W/AUTO DIFF WBC: CPT

## 2017-12-05 RX ORDER — CIPROFLOXACIN 500 MG/1
500 TABLET ORAL 2 TIMES DAILY
Qty: 20 TAB | Refills: 0 | Status: SHIPPED | OUTPATIENT
Start: 2017-12-05 | End: 2017-12-15

## 2017-12-05 NOTE — LETTER
12/6/2017 3:25 PM 
 
Mr. Eloisa Schmitz [address] [address] Dear Eloisa Schmitz: 
 
Please find your most recent results below. Resulted Orders LIPID PANEL Result Value Ref Range Cholesterol, total 185 100 - 199 mg/dL Triglyceride 200 (H) 0 - 149 mg/dL HDL Cholesterol 34 (L) >39 mg/dL VLDL, calculated 40 5 - 40 mg/dL LDL, calculated 111 (H) 0 - 99 mg/dL Narrative Performed at:  [address]  [id number] : Margarette Jensen MD, Phone:  [phone] METABOLIC PANEL, COMPREHENSIVE Result Value Ref Range Glucose 90 65 - 99 mg/dL BUN 18 8 - 27 mg/dL Creatinine 1.17 0.76 - 1.27 mg/dL GFR est non-AA 56 (L) >59 mL/min/1.73 GFR est AA 64 >59 mL/min/1.73  
 BUN/Creatinine ratio 15 10 - 24 Sodium 138 134 - 144 mmol/L Potassium 5.6 (H) 3.5 - 5.2 mmol/L Chloride 100 96 - 106 mmol/L  
 CO2 22 18 - 29 mmol/L Calcium 9.2 8.6 - 10.2 mg/dL Protein, total 6.8 6.0 - 8.5 g/dL Albumin 4.2 3.5 - 4.7 g/dL GLOBULIN, TOTAL 2.6 1.5 - 4.5 g/dL A-G Ratio 1.6 1.2 - 2.2 Bilirubin, total 0.3 0.0 - 1.2 mg/dL Alk. phosphatase 77 39 - 117 IU/L  
 AST (SGOT) 20 0 - 40 IU/L  
 ALT (SGPT) 11 0 - 44 IU/L Narrative Performed at:  [address]  [id number] : Margarette Jensen MD, Phone:  [phone] CBC WITH AUTOMATED DIFF Result Value Ref Range WBC 6.8 3.4 - 10.8 x10E3/uL  
 RBC 4.57 4.14 - 5.80 x10E6/uL HGB 13.5 13.0 - 17.7 g/dL Comment: **Please note reference interval change** HCT 39.3 37.5 - 51.0 % MCV 86 79 - 97 fL  
 MCH 29.5 26.6 - 33.0 pg  
 MCHC 34.4 31.5 - 35.7 g/dL  
 RDW 13.6 12.3 - 15.4 % PLATELET 290 447 - 570 x10E3/uL NEUTROPHILS 67 Not Estab. % Lymphocytes 23 Not Estab. % MONOCYTES 9 Not Estab. % EOSINOPHILS 1 Not Estab. %  BASOPHILS 0 Not Estab. %  
 ABS. NEUTROPHILS 4.6 1.4 - 7.0 x10E3/uL Abs Lymphocytes 1.6 0.7 - 3.1 x10E3/uL  
 ABS. MONOCYTES 0.6 0.1 - 0.9 x10E3/uL  
 ABS. EOSINOPHILS 0.1 0.0 - 0.4 x10E3/uL  
 ABS. BASOPHILS 0.0 0.0 - 0.2 x10E3/uL IMMATURE GRANULOCYTES 0 Not Estab. %  
 ABS. IMM. GRANS. 0.0 0.0 - 0.1 x10E3/uL Narrative Performed at:  [address]  [id number] : Tacos Randall MD, Phone:  [phone] TSH 3RD GENERATION Result Value Ref Range TSH 1.560 0.450 - 4.500 uIU/mL Narrative Performed at:  [address]  [id number] : Tacos Randall MD, Phone:  [phone] PSA, DIAGNOSTIC (PROSTATE SPECIFIC AG) Result Value Ref Range Prostate Specific Ag <0.1 0.0 - 4.0 ng/mL Comment:  
   Roche ECLIA methodology. According to the American Urological Association, Serum PSA should 
decrease and remain at undetectable levels after radical 
prostatectomy. The AUA defines biochemical recurrence as an initial 
PSA value 0.2 ng/mL or greater followed by a subsequent confirmatory PSA value 0.2 ng/mL or greater. Values obtained with different assay methods or kits cannot be used 
interchangeably. Results cannot be interpreted as absolute evidence 
of the presence or absence of malignant disease. Narrative Performed at:  [address]  [id number] : Tacos Randall MD, Phone:  [phone] CVD REPORT Result Value Ref Range INTERPRETATION Note Comment:  
   Supplemental report is available. PDF IMAGE Not applicable Narrative Performed at:  [address] [place] [address]  [id number] : Tiffany De La Vega PhD, Phone:  [phone] CKD REPORT Result Value Ref Range Interpretation Note Comment:  
   Supplemental report is available. Narrative Performed at:  [address] [place] [address]  [id number] : Ana Gutierrez PhD, Phone:  [phone] RECOMMENDATIONS: 
 
  
    
  After reviewing your labs, I believe they are within normal  
limits for your age and let us stay with our current plan of action. In addition, you may receive a Press Ganey Survey from our office.    
Thank you in advance for filling this out for us, and if you cannot  
give a 10 on our ratings, please reach out to us and let us know  
what we can do better for you!  We strive for a ten, and while we  
may not be perfect 100% of the time, we always try our best for  
our patients! Osito Jeronimo M.D. Good Help to Those in Need Please call me if you have any questions: [phone] Sincerely, Crystal Napoles MD

## 2017-12-05 NOTE — MR AVS SNAPSHOT
Visit Information Date & Time Provider Department Dept. Phone Encounter #  
 12/5/2017 10:00 AM Lydia Dover MD [address] [facility] [phone] [id number] Follow-up Instructions Return if symptoms worsen or fail to improve. Upcoming Health Maintenance Date Due  
 MEDICARE YEARLY EXAM 1/19/2018 GLAUCOMA SCREENING Q2Y 4/17/2019 COLONOSCOPY 10/6/2021 DTaP/Tdap/Td series (2 - Td) 8/29/2027 Allergies as of 12/5/2017  Review Complete On: 12/5/2017 By: Lydia Dover MD  
  
 Severity Noted Reaction Type Reactions Sulfa (Sulfonamide Antibiotics)  06/16/2010    Unknown (comments) Current Immunizations  Reviewed on 12/5/2017 Name Date Influenza High Dose Vaccine PF 8/29/2017, 10/17/2016 Influenza Vaccine 8/19/2015, 11/7/2013 Pneumococcal Conjugate (PCV-13) 7/8/2017, 12/8/2015 TD Vaccine 11/17/2006 Tdap 8/29/2017 ZZZ-RETIRED (DO NOT USE) Pneumococcal Vaccine (Unspecified Type) 5/19/2005 Zoster 10/1/2009 Zoster Vaccine, Live 8/2/2016 Reviewed by Lydia Dover MD on 12/5/2017 at 10:31 AM  
 Reviewed by Lydia Dover MD on 12/5/2017 at 10:31 AM  
You Were Diagnosed With   
  
 Codes Comments Routine general medical examination at a health care facility    -  Primary ICD-10-CM: Z00.00 ICD-9-CM: V70.0 Ischemic colitis ([facility]     ICD-10-CM: K55.9 ICD-9-CM: 557.9 Cancer [facility])     ICD-10-CM: C80.1 ICD-9-CM: 199.1 Primary hypertension     ICD-10-CM: I10 
ICD-9-CM: 401.9 Acquired hypothyroidism     ICD-10-CM: E03.9 ICD-9-CM: 244.9 Mixed hyperlipidemia     ICD-10-CM: E78.2 ICD-9-CM: 272.2 Advance care planning     ICD-10-CM: Z71.89 ICD-9-CM: V65.49 Diverticulitis of large intestine with perforation and abscess without bleeding     ICD-10-CM: K57.20 ICD-9-CM: 562.11 Vitals BP Pulse Temp Resp Height(growth percentile) Weight(growth percentile) 141/71 76 98.3 °F (36.8 °C) (Oral) 18 5' 4\" (1.626 m) 157 lb (71.2 kg) SpO2 BMI Smoking Status 98% 26.95 kg/m2 Former Smoker BMI and BSA Data Body Mass Index Body Surface Area  
 26.95 kg/m 2 1.79 m 2 Preferred Pharmacy Pharmacy Name Phone [facility] [address] [place] [address], [address] [place] [address] [phone] Your Updated Medication List  
  
   
This list is accurate as of: 12/5/17 10:33 AM.  Always use your most recent med list.  
  
  
  
  
 aspirin delayed-release 81 mg tablet Take  by mouth daily. atorvastatin 80 mg tablet Commonly known as:  LIPITOR  
TAKE 1 TABLET BY MOUTH EVERY DAY  
  
 ciprofloxacin HCl 500 mg tablet Commonly known as:  CIPRO Take 1 Tab by mouth two (2) times a day for 10 days. dicyclomine 20 mg tablet Commonly known as:  BENTYL TAKE ONE TABLET BY MOUTH EVERY SIX HOURS  
  
 levothyroxine 112 mcg tablet Commonly known as:  SYNTHROID  
TAKE 1 TABLET BY MOUTH DAILY BEFORE BREAKFAST  
  
 lisinopril 20 mg tablet Commonly known as:  PRINIVIL, ZESTRIL  
TAKE 1 TABLET BY MOUTH DAILY LORazepam 1 mg tablet Commonly known as:  ATIVAN  
TAKE 1 TABLET BY MOUTH EVERY 8 HOURS AS NEEDED  
  
 omega-3s-dha-epa-fish oil-D3 360 mg-1,200 mg -1,000 unit Cap Take  by mouth. ondansetron 4 mg disintegrating tablet Commonly known as:  ZOFRAN ODT Take 1 Tab by mouth every eight (8) hours as needed for Nausea. PARoxetine 20 mg tablet Commonly known as:  PAXIL TAKE 1 TABLET BY MOUTH EVERY NIGHT  
  
 PROBIOTIC 4X 10-15 mg Tbec Generic drug:  B.infantis-B.ani-B.long-B.bifi Take  by mouth. Saccharomyces boulardii 250 mg capsule Commonly known as:  [employer] Take 1 Cap by mouth two (2) times a day for 7 days. TYLENOL 325 mg tablet Generic drug:  acetaminophen Take  by mouth every four (4) hours as needed for Pain. Prescriptions Sent to Pharmacy Refills  
 ciprofloxacin HCl (CIPRO) 500 mg tablet 0 Sig: Take 1 Tab by mouth two (2) times a day for 10 days. Class: Normal  
 Pharmacy: [facility] [address] [place], [place] [address]y [address] [place] [address] #: [phone] Route: Oral  
  
We Performed the Following CBC WITH AUTOMATED DIFF [60086 CPT(R)] LIPID PANEL [36495 CPT(R)] METABOLIC PANEL, COMPREHENSIVE [37828 CPT(R)] PSA, DIAGNOSTIC (PROSTATE SPECIFIC AG) [id number] CPT(R)] TSH 3RD GENERATION [00956 CPT(R)] Follow-up Instructions Return if symptoms worsen or fail to improve. Introducing [facility]! John Chávez introduces iiko patient portal. Now you can access parts of your medical record, email your doctor's office, and request medication refills online. 1. In your internet browser, go to [email or web address]„¢ 2. Click on the First Time User? Click Here link in the Sign In box. You will see the New Member Sign Up page. 3. Enter your iiko Access Code exactly as it appears below. You will not need to use this code after youve completed the sign-up process. If you do not sign up before the expiration date, you must request a new code. · iiko Access Code: [email or web address] Expires: 2/14/2018  2:53 PM 
 
4. Enter the last four digits of your Social Security Number (xxxx) and Date of Birth (mm/dd/yyyy) as indicated and click Submit. You will be taken to the next sign-up page. 5. Create a Volaris Advisorst ID. This will be your iiko login ID and cannot be changed, so think of one that is secure and easy to remember. 6. Create a iiko password. You can change your password at any time. 7. Enter your Password Reset Question and Answer. This can be used at a later time if you forget your password. 8. Enter your e-mail address. You will receive e-mail notification when new information is available in [address] 9. Click Sign Up. You can now view and download portions of your medical record. 10. Click the Download Summary menu link to download a portable copy of your medical information. If you have questions, please visit the Frequently Asked Questions section of the [facility] website. Remember, [facility] is NOT to be used for urgent needs. For medical emergencies, dial 911. Now available from your iPhone and Android! Please provide this summary of care documentation to your next provider. Your primary care clinician is listed as MIREYA PAULINO. If you have any questions after today's visit, please call [phone].

## 2017-12-05 NOTE — PROGRESS NOTES
1. Have you been to the ER, urgent care clinic since your last visit? Hospitalized since your last visit? No    2. Have you seen or consulted any other health care providers outside of the [address] [place] [address] since your last visit? Include any pap smears or colon screening. No     Chief Complaint   Patient presents with    Medication Refill    Complete Physical    Annual Wellness Visit    Labs Only       Pt presents to the office med refill, CPE, AWV, Lab. Medicare Wellness Exam:    Chief Complaint   Patient presents with    Medication Refill    Complete Physical    Annual Wellness Visit    Labs Only     he is a 80y.o. year old male who presents for evaluation for their Medicare Wellness Visit. Rubye Pacheco is completed and assessed=yes  Depression Screen is completed and assessed=yes  Medication list reviewed and adjusted for accuracy=yes  Immunizations reviewed and updated=yes  Health/Preventative Screenings reviewed and updated=yes  ADL Functions reviewed=yes    Patient Active Problem List    Diagnosis    Diverticulitis of large intestine with perforation and abscess without bleeding    Ischemic colitis ([facility]    Advance care planning     Has a Lw      Insomnia    Primary hypertension    Acquired hypothyroidism    Mixed hyperlipidemia    Coronary atherosclerosis of native coronary artery    Cancer [facility])     prostate         Reviewed PmHx, RxHx, FmHx, SocHx, AllgHx and updated and dated in the chart.     Review of Systems - negative except as listed above in the HPI    Objective:     Vitals:    12/05/17 1012   BP: 141/71   Pulse: 76   Resp: 18   Temp: 98.3 °F (36.8 °C)   TempSrc: Oral   SpO2: 98%   Weight: 157 lb (71.2 kg)   Height: 5' 4\" (1.626 m)     Physical Examination: General appearance - alert, well appearing, and in no distress  Eyes - pupils equal and reactive, extraocular eye movements intact  Ears - bilateral TM's and external ear canals normal  Nose - normal and patent, no erythema, discharge or polyps  Mouth - mucous membranes moist, pharynx normal without lesions  Neck - supple, no significant adenopathy  Chest - clear to auscultation, no wheezes, rales or rhonchi, symmetric air entry  Heart - normal rate, regular rhythm, normal S1, S2, no murmurs, rubs, clicks or gallops  Abdomen - soft, nontender, nondistended, no masses or organomegaly  Extremities - peripheral pulses normal, no pedal edema, no clubbing or cyanosis    Assessment/ Plan:   Diagnoses and all orders for this visit:    1. Routine general medical examination at a health care facility  -     LIPID PANEL  -     METABOLIC PANEL, COMPREHENSIVE  -     CBC WITH AUTOMATED DIFF  -     TSH 3RD GENERATION  -     PROSTATE SPECIFIC AG    2. Ischemic colitis ([facility]  3. Cancer ([facility]  -stable    4. Primary hypertension  -     LIPID PANEL  -     METABOLIC PANEL, COMPREHENSIVE  -at goal for age    11. Acquired hypothyroidism  -     CBC WITH AUTOMATED DIFF  -     TSH 3RD GENERATION    6. Mixed hyperlipidemia  -     LIPID PANEL  -     METABOLIC PANEL, COMPREHENSIVE    7. Advance care planning  -I discussed with patient living will and gave a legal form for patient to fill out and bring back to the office. 8. Diverticulitis of large intestine with perforation and abscess without bleeding  -     ciprofloxacin HCl (CIPRO) 500 mg tablet;  Take 1 Tab by mouth two (2) times a day for 10 days.         -Pain evaluation performed in office  -Cognitive Screen performed in office  -Depression Screen, Fall risks (by up and go test)  and ADL functionality were addressed  -Medication list updated and reviewed for any changes   -A comprehensive review of medical issues and a plan was formulated  -End of life planning was addressed with pt   -Health Screenings for preventions were addressed and a plan was formulated  -Shingles Vaccine was recommended  -Discussed with patient cancer risk factors and appropriate screenings for age  -Patient evaluated for colonoscopy and referred if needed per screeing criteria  -Labs from previous visits were discussed with patient   -Discussed with patient diet and exercise and formulated a plan as needed  -An Advanced care plan was developed with the patient.  -Alcohol screening performed and was negative    -Follow-up Disposition:  Return if symptoms worsen or fail to improve. I have discussed the diagnosis with the patient and the intended plan as seen in the above orders. The patient understands and agrees with the plan. The patient has received an after-visit summary and questions were answered concerning future plans. Medication Side Effects and Warnings were discussed with patien  Patient Labs were reviewed and or requested  Patient Past Records were reviewed and or requested    There are no Patient Instructions on file for this visit.       Agatha Cortez M.D.

## 2017-12-06 LAB
ALBUMIN SERPL-MCNC: 4.2 G/DL (ref 3.5–4.7)
ALBUMIN/GLOB SERPL: 1.6 {RATIO} (ref 1.2–2.2)
ALP SERPL-CCNC: 77 IU/L (ref 39–117)
ALT SERPL-CCNC: 11 IU/L (ref 0–44)
AST SERPL-CCNC: 20 IU/L (ref 0–40)
BASOPHILS # BLD AUTO: 0 X10E3/UL (ref 0–0.2)
BASOPHILS NFR BLD AUTO: 0 %
BILIRUB SERPL-MCNC: 0.3 MG/DL (ref 0–1.2)
BUN SERPL-MCNC: 18 MG/DL (ref 8–27)
BUN/CREAT SERPL: 15 (ref 10–24)
CALCIUM SERPL-MCNC: 9.2 MG/DL (ref 8.6–10.2)
CHLORIDE SERPL-SCNC: 100 MMOL/L (ref 96–106)
CHOLEST SERPL-MCNC: 185 MG/DL (ref 100–199)
CO2 SERPL-SCNC: 22 MMOL/L (ref 18–29)
CREAT SERPL-MCNC: 1.17 MG/DL (ref 0.76–1.27)
EOSINOPHIL # BLD AUTO: 0.1 X10E3/UL (ref 0–0.4)
EOSINOPHIL NFR BLD AUTO: 1 %
ERYTHROCYTE [DISTWIDTH] IN BLOOD BY AUTOMATED COUNT: 13.6 % (ref 12.3–15.4)
GFR SERPLBLD CREATININE-BSD FMLA CKD-EPI: 56 ML/MIN/1.73
GFR SERPLBLD CREATININE-BSD FMLA CKD-EPI: 64 ML/MIN/1.73
GLOBULIN SER CALC-MCNC: 2.6 G/DL (ref 1.5–4.5)
GLUCOSE SERPL-MCNC: 90 MG/DL (ref 65–99)
HCT VFR BLD AUTO: 39.3 % (ref 37.5–51)
HDLC SERPL-MCNC: 34 MG/DL
HGB BLD-MCNC: 13.5 G/DL (ref 13–17.7)
IMM GRANULOCYTES # BLD: 0 X10E3/UL (ref 0–0.1)
IMM GRANULOCYTES NFR BLD: 0 %
INTERPRETATION, 910389: NORMAL
INTERPRETATION: NORMAL
LDLC SERPL CALC-MCNC: 111 MG/DL (ref 0–99)
LYMPHOCYTES # BLD AUTO: 1.6 X10E3/UL (ref 0.7–3.1)
LYMPHOCYTES NFR BLD AUTO: 23 %
MCH RBC QN AUTO: 29.5 PG (ref 26.6–33)
MCHC RBC AUTO-ENTMCNC: 34.4 G/DL (ref 31.5–35.7)
MCV RBC AUTO: 86 FL (ref 79–97)
MONOCYTES # BLD AUTO: 0.6 X10E3/UL (ref 0.1–0.9)
MONOCYTES NFR BLD AUTO: 9 %
NEUTROPHILS # BLD AUTO: 4.6 X10E3/UL (ref 1.4–7)
NEUTROPHILS NFR BLD AUTO: 67 %
PDF IMAGE, 910387: NORMAL
PLATELET # BLD AUTO: 271 X10E3/UL (ref 150–379)
POTASSIUM SERPL-SCNC: 5.6 MMOL/L (ref 3.5–5.2)
PROT SERPL-MCNC: 6.8 G/DL (ref 6–8.5)
PSA SERPL-MCNC: <0.1 NG/ML (ref 0–4)
RBC # BLD AUTO: 4.57 X10E6/UL (ref 4.14–5.8)
SODIUM SERPL-SCNC: 138 MMOL/L (ref 134–144)
TRIGL SERPL-MCNC: 200 MG/DL (ref 0–149)
TSH SERPL DL<=0.005 MIU/L-ACNC: 1.56 UIU/ML (ref 0.45–4.5)
VLDLC SERPL CALC-MCNC: 40 MG/DL (ref 5–40)
WBC # BLD AUTO: 6.8 X10E3/UL (ref 3.4–10.8)

## 2017-12-06 NOTE — PROGRESS NOTES
A letter was sent to the patient at the address on file, with lab results and Dr. Kevin Romo recommendations for the patient.

## 2017-12-06 NOTE — PROGRESS NOTES
After reviewing your labs, I believe they are within normal  limits for your age and let us stay with our current plan of action. In addition, you may receive a [employer] from our office. Thank you in advance for filling this out for us, and if you cannot   give a 10 on our ratings, please reach out to us and let us know  what we can do better for you! We strive for a ten, and while we   may not be perfect 100% of the time, we always try our best for  our patients! Felicia Rubin M.D.   Good Help to Those in Need

## 2018-01-16 ENCOUNTER — OFFICE VISIT (OUTPATIENT)
Dept: FAMILY MEDICINE CLINIC | Age: 83
End: 2018-01-16

## 2018-01-16 VITALS
SYSTOLIC BLOOD PRESSURE: 140 MMHG | DIASTOLIC BLOOD PRESSURE: 79 MMHG | HEIGHT: 64 IN | BODY MASS INDEX: 27.31 KG/M2 | OXYGEN SATURATION: 98 % | TEMPERATURE: 98 F | RESPIRATION RATE: 18 BRPM | HEART RATE: 78 BPM | WEIGHT: 160 LBS

## 2018-01-16 DIAGNOSIS — R09.81 NASAL CONGESTION: ICD-10-CM

## 2018-01-16 DIAGNOSIS — M25.512 ACUTE PAIN OF LEFT SHOULDER: Primary | ICD-10-CM

## 2018-01-16 RX ORDER — KETOROLAC TROMETHAMINE 30 MG/ML
30 INJECTION, SOLUTION INTRAMUSCULAR; INTRAVENOUS ONCE
Qty: 1 VIAL | Refills: 0
Start: 2018-01-16 | End: 2018-01-16

## 2018-01-16 RX ORDER — LEVOCETIRIZINE DIHYDROCHLORIDE 5 MG/1
5 TABLET, FILM COATED ORAL DAILY
Qty: 30 TAB | Refills: 2 | Status: SHIPPED | OUTPATIENT
Start: 2018-01-16 | End: 2018-01-16 | Stop reason: SDUPTHER

## 2018-01-16 RX ORDER — TIZANIDINE 4 MG/1
TABLET ORAL
Qty: 270 TAB | Refills: 1 | Status: SHIPPED | OUTPATIENT
Start: 2018-01-16 | End: 2018-05-01 | Stop reason: ALTCHOICE

## 2018-01-16 RX ORDER — LEVOCETIRIZINE DIHYDROCHLORIDE 5 MG/1
TABLET, FILM COATED ORAL
Qty: 90 TAB | Refills: 2 | Status: SHIPPED | OUTPATIENT
Start: 2018-01-16 | End: 2018-11-19

## 2018-01-16 RX ORDER — TIZANIDINE 4 MG/1
4 TABLET ORAL
Qty: 30 TAB | Refills: 1 | Status: SHIPPED | OUTPATIENT
Start: 2018-01-16 | End: 2018-01-16 | Stop reason: SDUPTHER

## 2018-01-16 NOTE — MR AVS SNAPSHOT
[address] [address] 
[phone] Patient: Chiquita Chaidez MRN:  [id number] Visit Information Date & Time Provider Department Dept. Phone Encounter #  
 1/16/2018  1:40 PM Ervin Castleman Risser, MD [address] [facility] [phone] [id number] Upcoming Health Maintenance Date Due  
 MEDICARE YEARLY EXAM 12/6/2018 GLAUCOMA SCREENING Q2Y 4/17/2019 COLONOSCOPY 10/6/2021 DTaP/Tdap/Td series (2 - Td) 8/29/2027 Allergies as of 1/16/2018  Review Complete On: 1/16/2018 By: Bryce Dumont MD  
  
 Severity Noted Reaction Type Reactions Sulfa (Sulfonamide Antibiotics)  06/16/2010    Unknown (comments) Current Immunizations  Reviewed on 1/16/2018 Name Date Influenza High Dose Vaccine PF 8/29/2017, 10/17/2016 Influenza Vaccine 8/19/2015, 11/7/2013 Pneumococcal Conjugate (PCV-13) 7/8/2017, 12/8/2015 TD Vaccine 11/17/2006 Tdap 8/29/2017 ZZZ-RETIRED (DO NOT USE) Pneumococcal Vaccine (Unspecified Type) 5/19/2005 Zoster 10/1/2009 Zoster Vaccine, Live 8/2/2016 Reviewed by Bryce Dumont MD on 1/16/2018 at  2:28 PM  
You Were Diagnosed With   
  
 Codes Comments Acute pain of left shoulder    -  Primary ICD-10-CM: M25.512 ICD-9-CM: 719.41 Nasal congestion     ICD-10-CM: R09.81 ICD-9-CM: 478.19 Vitals BP Pulse Temp Resp Height(growth percentile) Weight(growth percentile) 140/79 (BP 1 Location: Left arm, BP Patient Position: Sitting) 78 98 °F (36.7 °C) (Oral) 18 5' 4\" (1.626 m) 160 lb (72.6 kg) SpO2 BMI Smoking Status 98% 27.46 kg/m2 Former Smoker BMI and BSA Data Body Mass Index Body Surface Area  
 27.46 kg/m 2 1.81 m 2 Preferred Pharmacy Pharmacy Name Phone [facility] [address] [facility], [address] [facility] [address] [place] [phone] Your Updated Medication List  
  
   
This list is accurate as of: 1/16/18  2:34 PM.  Always use your most recent med list.  
  
  
  
  
 aspirin delayed-release 81 mg tablet Take  by mouth daily. atorvastatin 80 mg tablet Commonly known as:  LIPITOR  
TAKE 1 TABLET BY MOUTH EVERY DAY  
  
 ketorolac tromethamine 60 mg/2 mL Soln Commonly known as:  TORADOL  
1 mL by IntraMUSCular route once for 1 dose. levocetirizine 5 mg tablet Commonly known as:  Marcellina Clamp Take 1 Tab by mouth daily. levothyroxine 112 mcg tablet Commonly known as:  SYNTHROID  
TAKE 1 TABLET BY MOUTH DAILY BEFORE BREAKFAST  
  
 lisinopril 20 mg tablet Commonly known as:  PRINIVIL, ZESTRIL  
TAKE 1 TABLET BY MOUTH DAILY  
  
 omega-3s-dha-epa-fish oil-D3 360 mg-1,200 mg -1,000 unit Cap Take  by mouth. PROBIOTIC 4X 10-15 mg Tbec Generic drug:  B.infantis-B.ani-B.long-B.bifi Take  by mouth. tiZANidine 4 mg tablet Commonly known as:  Joaquin Shay Take 1 Tab by mouth three (3) times daily as needed. TYLENOL 325 mg tablet Generic drug:  acetaminophen Take  by mouth every four (4) hours as needed for Pain. Prescriptions Sent to Pharmacy Refills  
 tiZANidine (ZANAFLEX) 4 mg tablet 1 Sig: Take 1 Tab by mouth three (3) times daily as needed. Class: Normal  
 Pharmacy: [facility] [place] [address] E Ph #: [phone] Route: Oral  
 levocetirizine (XYZAL) 5 mg tablet 2 Sig: Take 1 Tab by mouth daily. Class: Normal  
 Pharmacy: [facility] [address] [place], [place] [address]y [address] [place] [address] E Ph #: [phone] Route: Oral  
  
We Performed the Following KETOROLAC TROMETHAMINE INJ [ [facility]] OR THER/PROPH/DIAG INJECTION, SUBCUT/IM L220626 CPT(R)] Introducing [facility] & HEALTH SERVICES!    
 [facility] introduces [employer] patient portal. Now you can access parts of your medical record, email your doctor's office, and request medication refills online. 1. In your internet browser, go to [email or web address] 2. Click on the First Time User? Click Here link in the Sign In box. You will see the New Member Sign Up page. 3. Enter your [employer] Access Code exactly as it appears below. You will not need to use this code after youve completed the sign-up process. If you do not sign up before the expiration date, you must request a new code. · [employer] Access Code: [email or web address] Expires: 2/14/2018  2:53 PM 
 
4. Enter the last four digits of your Social Security Number (xxxx) and Date of Birth (mm/dd/yyyy) as indicated and click Submit. You will be taken to the next sign-up page. 5. Create a [employer] ID. This will be your [employer] login ID and cannot be changed, so think of one that is secure and easy to remember. 6. Create a [employer] password. You can change your password at any time. 7. Enter your Password Reset Question and Answer. This can be used at a later time if you forget your password. 8. Enter your e-mail address. You will receive e-mail notification when new information is available in [address] 9. Click Sign Up. You can now view and download portions of your medical record. 10. Click the Download Summary menu link to download a portable copy of your medical information. If you have questions, please visit the Frequently Asked Questions section of the [employer] website. Remember, [employer] is NOT to be used for urgent needs. For medical emergencies, dial 911. Now available from your iPhone and Android! Please provide this summary of care documentation to your next provider. Your primary care clinician is listed as MIREYA PAULINO. If you have any questions after today's visit, please call [phone].

## 2018-01-16 NOTE — PROGRESS NOTES
Pt here c/o left shoulder pain x 4 days. Reports pain is worse with movement. Has been using Bengay with no improvement. Cause of pain is unknown. Subjective: (As above and below)     Chief Complaint   Patient presents with    Shoulder Pain     he is a 80y.o. year old male who presents for evaluation. Reviewed PmHx, RxHx, FmHx, SocHx, AllgHx and updated in chart. Review of Systems - negative except as listed above    Objective:     Vitals:    01/16/18 1354   BP: 140/79   Pulse: 78   Resp: 18   Temp: 98 °F (36.7 °C)   TempSrc: Oral   SpO2: 98%   Weight: 160 lb (72.6 kg)   Height: 5' 4\" (1.626 m)     Physical Examination: General appearance - alert, well appearing, and in no distress  Mental status - normal mood, behavior, speech, dress, motor activity, and thought processes  Mouth - mucous membranes moist, pharynx normal without lesions  Chest - clear to auscultation, no wheezes, rales or rhonchi, symmetric air entry  Heart - normal rate, regular rhythm, normal S1, S2, no murmurs, rubs, clicks or gallops  Musculoskeletal - left shoulder pain with over head motion and posterior rotation    Assessment/ Plan:   1. Acute pain of left shoulder  Toradol in office today  tizanidine as needed  - KETOROLAC TROMETHAMINE INJ  - [place] THER/PROPH/DIAG INJECTION, SUBCUT/IM     2. Nasal Congestion  -xyzal as written    Follow-up Disposition: As needed  I have discussed the diagnosis with the patient and the intended plan as seen in the above orders. The patient has received an after-visit summary and questions were answered concerning future plans.      Medication Side Effects and Warnings were discussed with patient: yes  Patient Labs were reviewed: yes  Patient Past Records were reviewed:  yes    Denisse Kyle M.D.

## 2018-01-16 NOTE — PROGRESS NOTES
Pt here c/o left shoulder pain x 4 days. Reports pain is worse with movement. Has been using Bengay with no improvement. Cause of pain is unknown.

## 2018-01-30 ENCOUNTER — OFFICE VISIT (OUTPATIENT)
Dept: FAMILY MEDICINE CLINIC | Age: 83
End: 2018-01-30

## 2018-01-30 VITALS
DIASTOLIC BLOOD PRESSURE: 77 MMHG | OXYGEN SATURATION: 98 % | RESPIRATION RATE: 20 BRPM | BODY MASS INDEX: 26.8 KG/M2 | HEART RATE: 68 BPM | WEIGHT: 157 LBS | HEIGHT: 64 IN | TEMPERATURE: 97.7 F | SYSTOLIC BLOOD PRESSURE: 150 MMHG

## 2018-01-30 DIAGNOSIS — N39.3 STRESS INCONTINENCE OF URINE: Primary | ICD-10-CM

## 2018-01-30 RX ORDER — TOLTERODINE 4 MG/1
4 CAPSULE, EXTENDED RELEASE ORAL DAILY
Qty: 30 CAP | Refills: 2 | Status: SHIPPED | OUTPATIENT
Start: 2018-01-30 | End: 2019-05-06

## 2018-01-30 NOTE — PROGRESS NOTES
Patient here for discussion of medication that can help with urinary leakage. He has had this since prostate surgery many years ago. 1. Have you been to the ER, urgent care clinic since your last visit? Hospitalized since your last visit? No    2. Have you seen or consulted any other health care providers outside of the [address] [place] [address] since your last visit? Include any pap smears or colon screening. No       Chief Complaint   Patient presents with    Request For New Medication     want to discuss new medication for bl;adder leakage     He is a 80 y.o. male who presents for evalution. Reviewed PmHx, RxHx, FmHx, SocHx, AllgHx and updated and dated in the chart. Patient Active Problem List    Diagnosis    Stress incontinence of urine    Diverticulitis of large intestine with perforation and abscess without bleeding    Ischemic colitis ([facility] [address]    Advance care planning     Has a Lw      Insomnia    Primary hypertension    Acquired hypothyroidism    Mixed hyperlipidemia    Coronary atherosclerosis of native coronary artery    Cancer [facility])     prostate         Review of Systems - negative except as listed above in the HPI    Objective:     Vitals:    01/30/18 1219   BP: 150/77   Pulse: 68   Resp: 20   Temp: 97.7 °F (36.5 °C)   SpO2: 98%   Weight: 157 lb (71.2 kg)   Height: 5' 4\" (1.626 m)     Physical Examination: General appearance - alert, well appearing, and in no distress  Abdomen - soft, nontender, nondistended, no masses or organomegaly    Assessment/ Plan:   Diagnoses and all orders for this visit:    1. Stress incontinence of urine  -     tolterodine ER (DETROL LA) 4 mg ER capsule; Take 1 Cap by mouth daily. Indications: URINARY URGE INCONTINENCE  -add rx  -dwp adrs       Follow-up Disposition:  Return if symptoms worsen or fail to improve. I have discussed the diagnosis with the patient and the intended plan as seen in the above orders.   The patient understands and agrees with the plan. The patient has received an after-visit summary and questions were answered concerning future plans. Medication Side Effects and Warnings were discussed with patient  Patient Labs were reviewed and or requested:  Patient Past Records were reviewed and or requested    Tai Gonsalves M.D. There are no Patient Instructions on file for this visit.

## 2018-01-30 NOTE — MR AVS SNAPSHOT
[address] [address] 
[phone] Patient: Vitaly Jo MRN:  [id number] Visit Information Date & Time Provider Department Dept. Phone Encounter #  
 1/30/2018 11:10 AM Almaz Andino MD [address] [facility] [phone] [id number] Follow-up Instructions Return if symptoms worsen or fail to improve. Upcoming Health Maintenance Date Due  
 MEDICARE YEARLY EXAM 12/6/2018 GLAUCOMA SCREENING Q2Y 4/17/2019 COLONOSCOPY 10/6/2021 DTaP/Tdap/Td series (2 - Td) 8/29/2027 Allergies as of 1/30/2018  Review Complete On: 1/30/2018 By: Almaz Andino MD  
  
 Severity Noted Reaction Type Reactions Sulfa (Sulfonamide Antibiotics)  06/16/2010    Unknown (comments) Current Immunizations  Reviewed on 1/16/2018 Name Date Influenza High Dose Vaccine PF 8/29/2017, 10/17/2016 Influenza Vaccine 8/19/2015, 11/7/2013 Pneumococcal Conjugate (PCV-13) 7/8/2017, 12/8/2015 TD Vaccine 11/17/2006 Tdap 8/29/2017 ZZZ-RETIRED (DO NOT USE) Pneumococcal Vaccine (Unspecified Type) 5/19/2005 Zoster 10/1/2009 Zoster Vaccine, Live 8/2/2016 Not reviewed this visit You Were Diagnosed With   
  
 Codes Comments Stress incontinence of urine    -  Primary ICD-10-CM: N39.3 ICD-9-CM: NWT9260 Vitals BP Pulse Temp Resp Height(growth percentile) Weight(growth percentile) 150/77 68 97.7 °F (36.5 °C) 20 5' 4\" (1.626 m) 157 lb (71.2 kg) SpO2 BMI Smoking Status 98% 26.95 kg/m2 Former Smoker Vitals History BMI and BSA Data Body Mass Index Body Surface Area  
 26.95 kg/m 2 1.79 m 2 Preferred Pharmacy Pharmacy Name Phone [facility]ORE [address] [place] [address], [address] [place] [address] Drive [phone] Your Updated Medication List  
  
   
 This list is accurate as of: 1/30/18 12:33 PM.  Always use your most recent med list.  
  
  
  
  
 aspirin delayed-release 81 mg tablet Take  by mouth daily. atorvastatin 80 mg tablet Commonly known as:  LIPITOR  
TAKE 1 TABLET BY MOUTH EVERY DAY  
  
 levocetirizine 5 mg tablet Commonly known as:  Jolaine Lied TAKE 1 TABLET BY MOUTH DAILY  
  
 levothyroxine 112 mcg tablet Commonly known as:  SYNTHROID  
TAKE 1 TABLET BY MOUTH DAILY BEFORE BREAKFAST  
  
 lisinopril 20 mg tablet Commonly known as:  PRINIVIL, ZESTRIL  
TAKE 1 TABLET BY MOUTH DAILY  
  
 omega-3s-dha-epa-fish oil-D3 360 mg-1,200 mg -1,000 unit Cap Take  by mouth. PROBIOTIC 4X 10-15 mg Tbec Generic drug:  B.infantis-B.ani-B.long-B.bifi Take  by mouth. tiZANidine 4 mg tablet Commonly known as:  Gabriel chaim TAKE 1 TABLET BY MOUTH THREE TIMES DAILY AS NEEDED  
  
 tolterodine ER 4 mg ER capsule Commonly known as:  Javier Bright Take 1 Cap by mouth daily. Indications: URINARY URGE INCONTINENCE  
  
 TYLENOL 325 mg tablet Generic drug:  acetaminophen Take  by mouth every four (4) hours as needed for Pain. Prescriptions Sent to Pharmacy Refills  
 tolterodine ER (DETROL LA) 4 mg ER capsule 2 Sig: Take 1 Cap by mouth daily. Indications: URINARY URGE INCONTINENCE Class: Normal  
 Pharmacy: [facility] [address] [place] [address] [address] [place] [address] #: [phone] Route: Oral  
  
Follow-up Instructions Return if symptoms worsen or fail to improve. Introducing [facility] & HEALTH SERVICES! [employer] introduces [employer] patient portal. Now you can access parts of your medical record, email your doctor's office, and request medication refills online. 1. In your internet browser, go to [email or web address] 2. Click on the First Time User? Click Here link in the Sign In box. You will see the New Member Sign Up page. 3. Enter your [employer] Access Code exactly as it appears below. You will not need to use this code after youve completed the sign-up process. If you do not sign up before the expiration date, you must request a new code. · [employer] Access Code: [email or web address] Expires: 2/14/2018  2:53 PM 
 
4. Enter the last four digits of your Social Security Number (xxxx) and Date of Birth (mm/dd/yyyy) as indicated and click Submit. You will be taken to the next sign-up page. 5. Create a [employer] ID. This will be your [employer] login ID and cannot be changed, so think of one that is secure and easy to remember. 6. Create a [employer] password. You can change your password at any time. 7. Enter your Password Reset Question and Answer. This can be used at a later time if you forget your password. 8. Enter your e-mail address. You will receive e-mail notification when new information is available in [address] 9. Click Sign Up. You can now view and download portions of your medical record. 10. Click the Download Summary menu link to download a portable copy of your medical information. If you have questions, please visit the Frequently Asked Questions section of the [employer] website. Remember, [employer] is NOT to be used for urgent needs. For medical emergencies, dial 911. Now available from your iPhone and Android! Please provide this summary of care documentation to your next provider. Your primary care clinician is listed as MIREYA PAULINO. If you have any questions after today's visit, please call [phone].

## 2018-02-17 DIAGNOSIS — E03.9 ACQUIRED HYPOTHYROIDISM: ICD-10-CM

## 2018-02-18 RX ORDER — LEVOTHYROXINE SODIUM 112 UG/1
TABLET ORAL
Qty: 90 TAB | Refills: 0 | Status: SHIPPED | OUTPATIENT
Start: 2018-02-18 | End: 2018-05-16 | Stop reason: SDUPTHER

## 2018-03-02 ENCOUNTER — OFFICE VISIT (OUTPATIENT)
Dept: FAMILY MEDICINE CLINIC | Age: 83
End: 2018-03-02

## 2018-03-02 VITALS
SYSTOLIC BLOOD PRESSURE: 122 MMHG | HEIGHT: 64 IN | HEART RATE: 64 BPM | OXYGEN SATURATION: 98 % | RESPIRATION RATE: 18 BRPM | WEIGHT: 157 LBS | BODY MASS INDEX: 26.8 KG/M2 | DIASTOLIC BLOOD PRESSURE: 64 MMHG | TEMPERATURE: 98.2 F

## 2018-03-02 DIAGNOSIS — H61.23 CERUMINOSIS, BILATERAL: Primary | ICD-10-CM

## 2018-03-02 NOTE — MR AVS SNAPSHOT
[address] [address] 
[phone] Patient: Yvon Matta MRN:  [id number] Visit Information Date & Time Provider Department Dept. Phone Encounter #  
 3/2/2018 11:10 AM Yamilet DaileyPanda [phone] [id number] Follow-up Instructions Return if symptoms worsen or fail to improve. Upcoming Health Maintenance Date Due  
 MEDICARE YEARLY EXAM 12/6/2018 GLAUCOMA SCREENING Q2Y 4/17/2019 COLONOSCOPY 10/6/2021 DTaP/Tdap/Td series (2 - Td) 8/29/2027 Allergies as of 3/2/2018  Review Complete On: 3/2/2018 By: Yamilet Dailey DO Severity Noted Reaction Type Reactions Sulfa (Sulfonamide Antibiotics)  06/16/2010    Unknown (comments) Current Immunizations  Reviewed on 1/16/2018 Name Date Influenza High Dose Vaccine PF 8/29/2017, 10/17/2016 Influenza Vaccine 8/19/2015, 11/7/2013 Pneumococcal Conjugate (PCV-13) 7/8/2017, 12/8/2015 TD Vaccine 11/17/2006 Tdap 8/29/2017 ZZZ-RETIRED (DO NOT USE) Pneumococcal Vaccine (Unspecified Type) 5/19/2005 Zoster 10/1/2009 Zoster Vaccine, Live 8/2/2016 Not reviewed this visit You Were Diagnosed With   
  
 Codes Comments Ceruminosis, bilateral    -  Primary ICD-10-CM: H61.23 
ICD-9-CM: 380.4 Vitals BP Pulse Temp Resp Height(growth percentile) Weight(growth percentile) 122/64 64 98.2 °F (36.8 °C) (Oral) 18 5' 4\" (1.626 m) 157 lb (71.2 kg) SpO2 BMI Smoking Status 98% 26.95 kg/m2 Former Smoker Vitals History BMI and BSA Data Body Mass Index Body Surface Area  
 26.95 kg/m 2 1.79 m 2 Preferred Pharmacy Pharmacy Name Phone [facility], [address] [place] [address] [phone] Your Updated Medication List  
  
   
 This list is accurate as of 3/2/18 11:46 AM.  Always use your most recent med list.  
  
  
  
  
 aspirin delayed-release 81 mg tablet Take  by mouth daily. atorvastatin 80 mg tablet Commonly known as:  LIPITOR  
TAKE 1 TABLET BY MOUTH EVERY DAY  
  
 levocetirizine 5 mg tablet Commonly known as:  Stephenie Dears TAKE 1 TABLET BY MOUTH DAILY  
  
 levothyroxine 112 mcg tablet Commonly known as:  SYNTHROID  
TAKE 1 TABLET BY MOUTH DAILY BEFORE BREAKFAST  
  
 lisinopril 20 mg tablet Commonly known as:  PRINIVIL, ZESTRIL  
TAKE 1 TABLET BY MOUTH DAILY  
  
 omega-3s-dha-epa-fish oil-D3 360 mg-1,200 mg -1,000 unit Cap Take  by mouth. PROBIOTIC 4X 10-15 mg Tbec Generic drug:  B.infantis-B.ani-B.long-B.bifi Take  by mouth. tiZANidine 4 mg tablet Commonly known as:  Gillian Ray TAKE 1 TABLET BY MOUTH THREE TIMES DAILY AS NEEDED  
  
 tolterodine ER 4 mg ER capsule Commonly known as:  Titi Forman Take 1 Cap by mouth daily. Indications: URINARY URGE INCONTINENCE  
  
 TYLENOL 325 mg tablet Generic drug:  acetaminophen Take  by mouth every four (4) hours as needed for Pain. Follow-up Instructions Return if symptoms worsen or fail to improve. Patient Instructions Earwax Blockage: Care Instructions Your Care Instructions Earwax is a natural substance that protects the ear canal. Normally, earwax drains from the ears and does not cause problems. Sometimes earwax builds up and hardens. Earwax blockage (also called cerumen impaction) can cause some loss of hearing and pain. When wax is tightly packed, you will need to have your doctor remove it. Follow-up care is a key part of your treatment and safety. Be sure to make and go to all appointments, and call your doctor if you are having problems. It's also a good idea to know your test results and keep a list of the medicines you take. How can you care for yourself at home? · Do not try to remove earwax with cotton swabs, fingers, or other objects. This can make the blockage worse and damage the eardrum. · If your doctor recommends that you try to remove earwax at home: ¨ Soften and loosen the earwax with warm mineral oil. You also can try hydrogen peroxide mixed with an equal amount of room temperature water. Place 2 drops of the fluid, warmed to body temperature, in the ear two times a day for up to 5 days. ¨ Once the wax is loose and soft, all that is usually needed to remove it from the ear canal is a gentle, warm shower. Direct the water into the ear, then tip your head to let the earwax drain out. Dry your ear thoroughly with a hair dryer set on low. Hold the dryer several inches from your ear. ¨ If the warm mineral oil and shower do not work, use an over-the-counter wax softener followed by gentle flushing with an ear syringe each night for a week or two. Make sure the flushing solution is body temperature. Cool or hot fluids in the ear can cause dizziness. When should you call for help? Call your doctor now or seek immediate medical care if: 
? · Pus or blood drains from your ear. ? · Your ears are ringing or feel full. ? · You have a loss of hearing. ? Watch closely for changes in your health, and be sure to contact your doctor if: 
? · You have pain or reduced hearing after 1 week of home treatment. ? · You have any new symptoms, such as nausea or balance problems. Where can you learn more? Go to http://camille-joe.info/. Enter A856 in the search box to learn more about \"Earwax Blockage: Care Instructions. \" Current as of: March 20, 2017 Content Version: 11.4 © 5640-7637 [employer]. Care instructions adapted under license by [employer] (which disclaims liability or warranty for this information).  If you have questions about a medical condition or this instruction, always ask your healthcare professional. [place] [address] any warranty or liability for your use of this information. Introducing [facility] & HEALTH SERVICES! [employer] Le[employer] introduces [employer] patient portal. Now you can access parts of your medical record, email your doctor's office, and request medication refills online. 1. In your internet browser, go to [email or web address] 2. Click on the First Time User? Click Here link in the Sign In box. You will see the New Member Sign Up page. 3. Enter your [employer] Access Code exactly as it appears below. You will not need to use this code after youve completed the sign-up process. If you do not sign up before the expiration date, you must request a new code. · [employer] Access Code: [email or web address] Expires: 5/31/2018 11:46 AM 
 
4. Enter the last four digits of your Social Security Number (xxxx) and Date of Birth (mm/dd/yyyy) as indicated and click Submit. You will be taken to the next sign-up page. 5. Create a CON[employer] ID. This will be your [employer] login ID and cannot be changed, so think of one that is secure and easy to remember. 6. Create a [employer] password. You can change your password at any time. 7. Enter your Password Reset Question and Answer. This can be used at a later time if you forget your password. 8. Enter your e-mail address. You will receive e-mail notification when new information is available in [address] 9. Click Sign Up. You can now view and download portions of your medical record. 10. Click the Download Summary menu link to download a portable copy of your medical information. If you have questions, please visit the Frequently Asked Questions section of the [employer] website. Remember, [employer] is NOT to be used for urgent needs. For medical emergencies, dial 911. Now available from your iPhone and Android! Please provide this summary of care documentation to your next provider. Your primary care clinician is listed as MIREYA PAULINO. If you have any questions after today's visit, please call [phone].

## 2018-03-02 NOTE — PATIENT INSTRUCTIONS
Earwax Blockage: Care Instructions  Your Care Instructions    Earwax is a natural substance that protects the ear canal. Normally, earwax drains from the ears and does not cause problems. Sometimes earwax builds up and hardens. Earwax blockage (also called cerumen impaction) can cause some loss of hearing and pain. When wax is tightly packed, you will need to have your doctor remove it. Follow-up care is a key part of your treatment and safety. Be sure to make and go to all appointments, and call your doctor if you are having problems. It's also a good idea to know your test results and keep a list of the medicines you take. How can you care for yourself at home? · Do not try to remove earwax with cotton swabs, fingers, or other objects. This can make the blockage worse and damage the eardrum. · If your doctor recommends that you try to remove earwax at home:  ¨ Soften and loosen the earwax with warm mineral oil. You also can try hydrogen peroxide mixed with an equal amount of room temperature water. Place 2 drops of the fluid, warmed to body temperature, in the ear two times a day for up to 5 days. ¨ Once the wax is loose and soft, all that is usually needed to remove it from the ear canal is a gentle, warm shower. Direct the water into the ear, then tip your head to let the earwax drain out. Dry your ear thoroughly with a hair dryer set on low. Hold the dryer several inches from your ear. ¨ If the warm mineral oil and shower do not work, use an over-the-counter wax softener followed by gentle flushing with an ear syringe each night for a week or two. Make sure the flushing solution is body temperature. Cool or hot fluids in the ear can cause dizziness. When should you call for help? Call your doctor now or seek immediate medical care if:  ? · Pus or blood drains from your ear. ? · Your ears are ringing or feel full. ? · You have a loss of hearing. ? Watch closely for changes in your health, and be sure to contact your doctor if:  ? · You have pain or reduced hearing after 1 week of home treatment. ? · You have any new symptoms, such as nausea or balance problems. Where can you learn more? Go to http://camille-joe.info/. Enter G345 in the search box to learn more about \"Earwax Blockage: Care Instructions. \"  Current as of: March 20, 2017  Content Version: 11.4  © 5618-9728 [employer]. Care instructions adapted under license by [employer] (which disclaims liability or warranty for this information). If you have questions about a medical condition or this instruction, always ask your healthcare professional. [place] [address] any warranty or liability for your use of this information.

## 2018-03-02 NOTE — PROGRESS NOTES
Amadou Wiggins is a 80 y.o. male   Chief Complaint   Patient presents with    Ear Fullness    pt states he gets an ear plugged up and occurs once a year. No pain just can not hear very well. R one feels ok.      he is a 80y.o. year old male who presents for evalution. Reviewed PmHx, RxHx, FmHx, SocHx, AllgHx and updated and dated in the chart. Review of Systems - negative except as listed above in the HPI    Objective:     Vitals:    03/02/18 1122   BP: 122/64   Pulse: 64   Resp: 18   Temp: 98.2 °F (36.8 °C)   TempSrc: Oral   SpO2: 98%   Weight: 157 lb (71.2 kg)   Height: 5' 4\" (1.626 m)       Current Outpatient Prescriptions   Medication Sig    levothyroxine (SYNTHROID) 112 mcg tablet TAKE 1 TABLET BY MOUTH DAILY BEFORE BREAKFAST    tolterodine ER (DETROL LA) 4 mg ER capsule Take 1 Cap by mouth daily. Indications: URINARY URGE INCONTINENCE    tiZANidine (ZANAFLEX) 4 mg tablet TAKE 1 TABLET BY MOUTH THREE TIMES DAILY AS NEEDED    levocetirizine (XYZAL) 5 mg tablet TAKE 1 TABLET BY MOUTH DAILY    atorvastatin (LIPITOR) 80 mg tablet TAKE 1 TABLET BY MOUTH EVERY DAY    lisinopril (PRINIVIL, ZESTRIL) 20 mg tablet TAKE 1 TABLET BY MOUTH DAILY    B.infantis-B.ani-B.long-B.bifi (PROBIOTIC 4X) 10-15 mg TbEC Take  by mouth.  aspirin delayed-release 81 mg tablet Take  by mouth daily.  acetaminophen (TYLENOL) 325 mg tablet Take  by mouth every four (4) hours as needed for Pain.  omega-3s-dha-epa-fish oil-D3 360 mg-1,200 mg -1,000 unit cap Take  by mouth. No current facility-administered medications for this visit. Physical Examination: General appearance - alert, well appearing, and in no distress  Ears - ceruminosis noted, cerumen removed  Chest - clear to auscultation, no wheezes, rales or rhonchi, symmetric air entry  Heart - normal rate, regular rhythm, normal S1, S2, no murmurs, rubs, clicks or gallops      Assessment/ Plan:   Diagnoses and all orders for this visit:    1. Ceruminosis, bilateral     ceruminosis flushed by LPN successfully b/l ears  Follow-up Disposition:  Return if symptoms worsen or fail to improve. I have discussed the diagnosis with the patient and the intended plan as seen in the above orders. The patient has received an after-visit summary and questions were answered concerning future plans. Pt conveyed understanding of plan.     Medication Side Effects and Warnings were discussed with patient      Mickey Larkin DO

## 2018-05-01 ENCOUNTER — OFFICE VISIT (OUTPATIENT)
Dept: FAMILY MEDICINE CLINIC | Age: 83
End: 2018-05-01

## 2018-05-01 VITALS
BODY MASS INDEX: 26.63 KG/M2 | HEART RATE: 66 BPM | HEIGHT: 64 IN | WEIGHT: 156 LBS | DIASTOLIC BLOOD PRESSURE: 74 MMHG | SYSTOLIC BLOOD PRESSURE: 160 MMHG | OXYGEN SATURATION: 98 % | RESPIRATION RATE: 20 BRPM | TEMPERATURE: 97.8 F

## 2018-05-01 DIAGNOSIS — K55.9 ISCHEMIC COLITIS (HCC): ICD-10-CM

## 2018-05-01 DIAGNOSIS — F33.9 EPISODE OF RECURRENT MAJOR DEPRESSIVE DISORDER, UNSPECIFIED DEPRESSION EPISODE SEVERITY (HCC): Primary | ICD-10-CM

## 2018-05-01 DIAGNOSIS — C80.1 CANCER (HCC): ICD-10-CM

## 2018-05-01 RX ORDER — CITALOPRAM 20 MG/1
20 TABLET, FILM COATED ORAL DAILY
Qty: 30 TAB | Refills: 1 | Status: SHIPPED | OUTPATIENT
Start: 2018-05-01 | End: 2018-05-15 | Stop reason: SDUPTHER

## 2018-05-01 NOTE — MR AVS SNAPSHOT
[address] [address] 
[phone] Patient: Chaov Álvarez MRN:  [id number] Visit Information Date & Time Provider Department Dept. Phone Encounter #  
 5/1/2018 11:00 AM Bonnie Gotti MD [facility] [phone] [id number] Follow-up Instructions Return in about 1 month (around 6/1/2018) for depression. Upcoming Health Maintenance Date Due Influenza Age 5 to Adult 8/1/2018 MEDICARE YEARLY EXAM 12/6/2018 GLAUCOMA SCREENING Q2Y 4/17/2019 COLONOSCOPY 10/6/2021 DTaP/Tdap/Td series (2 - Td) 8/29/2027 Allergies as of 5/1/2018  Review Complete On: 5/1/2018 By: Bonnie Gotti MD  
  
 Severity Noted Reaction Type Reactions Sulfa (Sulfonamide Antibiotics)  06/16/2010    Unknown (comments) Current Immunizations  Reviewed on 1/16/2018 Name Date Influenza High Dose Vaccine PF 8/29/2017, 10/17/2016 Influenza Vaccine 8/19/2015, 11/7/2013 Pneumococcal Conjugate (PCV-13) 7/8/2017, 12/8/2015 TD Vaccine 11/17/2006 Tdap 8/29/2017 ZZZ-RETIRED (DO NOT USE) Pneumococcal Vaccine (Unspecified Type) 5/19/2005 Zoster 10/1/2009 Zoster Vaccine, Live 8/2/2016 Not reviewed this visit You Were Diagnosed With   
  
 Codes Comments Episode of recurrent major depressive disorder, unspecified depression episode severity ([facility]    -  Primary ICD-10-CM: F33.9 ICD-9-CM: 296.30 Ischemic colitis ([facility]     ICD-10-CM: K55.9 ICD-9-CM: 557.9 Cancer [facility])     ICD-10-CM: C80.1 ICD-9-CM: 199.1 Vitals BP Pulse Temp Resp Height(growth percentile) Weight(growth percentile) 160/74 66 97.8 °F (36.6 °C) 20 5' 4\" (1.626 m) 156 lb (70.8 kg) SpO2 BMI Smoking Status 98% 26.78 kg/m2 Former Smoker Vitals History BMI and BSA Data Body Mass Index Body Surface Area  
 26.78 kg/m 2 1.79 m 2 Preferred Pharmacy Pharmacy Name Phone [facility] [facility],  Jenifer Marinelli [address] [place] [address] [phone] Your Updated Medication List  
  
   
This list is accurate as of 5/1/18 11:46 AM.  Always use your most recent med list.  
  
  
  
  
 aspirin delayed-release 81 mg tablet Take  by mouth daily. atorvastatin 80 mg tablet Commonly known as:  LIPITOR  
TAKE 1 TABLET BY MOUTH EVERY DAY  
  
 citalopram 20 mg tablet Commonly known as:  Cynthia Barney Take 1 Tab by mouth daily. levocetirizine 5 mg tablet Commonly known as:  Cathie Santos TAKE 1 TABLET BY MOUTH DAILY  
  
 levothyroxine 112 mcg tablet Commonly known as:  SYNTHROID  
TAKE 1 TABLET BY MOUTH DAILY BEFORE BREAKFAST  
  
 lisinopril 20 mg tablet Commonly known as:  PRINIVIL, ZESTRIL  
TAKE 1 TABLET BY MOUTH DAILY  
  
 omega-3s-dha-epa-fish oil-D3 360 mg-1,200 mg -1,000 unit Cap Take  by mouth. PROBIOTIC 4X 10-15 mg Tbec Generic drug:  B.infantis-B.ani-B.long-B.bifi Take  by mouth. tolterodine ER 4 mg ER capsule Commonly known as:  Parveen Bartlett Take 1 Cap by mouth daily. Indications: URINARY URGE INCONTINENCE  
  
 TYLENOL 325 mg tablet Generic drug:  acetaminophen Take  by mouth every four (4) hours as needed for Pain. Prescriptions Sent to Pharmacy Refills  
 citalopram (CELEXA) 20 mg tablet 1 Sig: Take 1 Tab by mouth daily. Class: Normal  
 Pharmacy: [facility] [address] [place] [address] [address] [place] [address] #: [phone] Route: Oral  
  
Follow-up Instructions Return in about 1 month (around 6/1/2018) for depression. Introducing [address]! [employer] introduces [employer] patient portal. Now you can access parts of your medical record, email your doctor's office, and request medication refills online. 1. In your internet browser, go to [email or web address] 2. Click on the First Time User? Click Here link in the Sign In box. You will see the New Member Sign Up page. 3. Enter your IMT Access Code exactly as it appears below. You will not need to use this code after youve completed the sign-up process. If you do not sign up before the expiration date, you must request a new code. · IMT Access Code: [email or web address] Expires: 5/31/2018 12:46 PM 
 
4. Enter the last four digits of your Social Security Number (xxxx) and Date of Birth (mm/dd/yyyy) as indicated and click Submit. You will be taken to the next sign-up page. 5. Create a IMT ID. This will be your IMT login ID and cannot be changed, so think of one that is secure and easy to remember. 6. Create a IMT password. You can change your password at any time. 7. Enter your Password Reset Question and Answer. This can be used at a later time if you forget your password. 8. Enter your e-mail address. You will receive e-mail notification when new information is available in [address] 9. Click Sign Up. You can now view and download portions of your medical record. 10. Click the Download Summary menu link to download a portable copy of your medical information. If you have questions, please visit the Frequently Asked Questions section of the IMT website. Remember, IMT is NOT to be used for urgent needs. For medical emergencies, dial 911. Now available from your iPhone and Android! Please provide this summary of care documentation to your next provider. Your primary care clinician is listed as MIREYA PAULINO. If you have any questions after today's visit, please call [phone].

## 2018-05-01 NOTE — PROGRESS NOTES
Patient states he never got over losing his wife, 2 years ago. This is worse. It is now affecting sleep, he has waves of nausea. 1. Have you been to the ER, urgent care clinic since your last visit? Hospitalized since your last visit? No    2. Have you seen or consulted any other health care providers outside of the [address] [place] [address] since your last visit? Include any pap smears or colon screening. No       Chief Complaint   Patient presents with    Depression     never got over losing wife 2 yrs ago. depression getting worse. He is a 80 y.o. male who presents for evalution. Reviewed PmHx, RxHx, FmHx, SocHx, AllgHx and updated and dated in the chart. Patient Active Problem List    Diagnosis    Episode of recurrent major depressive disorder ([facility]    Stress incontinence of urine    Diverticulitis of large intestine with perforation and abscess without bleeding    Ischemic colitis ([facility]    Advance care planning     Has a Lw      Insomnia    Primary hypertension    Acquired hypothyroidism    Mixed hyperlipidemia    Coronary atherosclerosis of native coronary artery    Cancer ([facility]     prostate         Review of Systems - negative except as listed above in the HPI    Objective:     Vitals:    05/01/18 1126   BP: 160/74   Pulse: 66   Resp: 20   Temp: 97.8 °F (36.6 °C)   SpO2: 98%   Weight: 156 lb (70.8 kg)   Height: 5' 4\" (1.626 m)     Physical Examination: General appearance - alert, well appearing, and in no distress    Assessment/ Plan:   Diagnoses and all orders for this visit:    1. Episode of recurrent major depressive disorder, unspecified depression episode severity ([facility]  -     citalopram (CELEXA) 20 mg tablet; Take 1 Tab by mouth daily. 2. Ischemic colitis ([address]  -stable    3. Cancer ([facility]  -stable       Follow-up Disposition:  Return in about 1 month (around 6/1/2018) for depression.     I have discussed the diagnosis with the patient and the intended plan as seen in the above orders. The patient understands and agrees with the plan. The patient has received an after-visit summary and questions were answered concerning future plans. Medication Side Effects and Warnings were discussed with patient  Patient Labs were reviewed and or requested:  Patient Past Records were reviewed and or requested    Marybeth Whitman M.D. There are no Patient Instructions on file for this visit.

## 2018-05-15 ENCOUNTER — OFFICE VISIT (OUTPATIENT)
Dept: FAMILY MEDICINE CLINIC | Age: 83
End: 2018-05-15

## 2018-05-15 VITALS
HEIGHT: 64 IN | OXYGEN SATURATION: 96 % | RESPIRATION RATE: 17 BRPM | HEART RATE: 72 BPM | BODY MASS INDEX: 26.8 KG/M2 | TEMPERATURE: 97.5 F | WEIGHT: 157 LBS | SYSTOLIC BLOOD PRESSURE: 162 MMHG | DIASTOLIC BLOOD PRESSURE: 71 MMHG

## 2018-05-15 DIAGNOSIS — F33.9 EPISODE OF RECURRENT MAJOR DEPRESSIVE DISORDER, UNSPECIFIED DEPRESSION EPISODE SEVERITY (HCC): ICD-10-CM

## 2018-05-15 DIAGNOSIS — R11.0 NAUSEA: Primary | ICD-10-CM

## 2018-05-15 RX ORDER — CITALOPRAM 10 MG/1
10 TABLET ORAL DAILY
Qty: 30 TAB | Refills: 1 | Status: SHIPPED | OUTPATIENT
Start: 2018-05-15 | End: 2018-05-29

## 2018-05-15 RX ORDER — ONDANSETRON 4 MG/1
4 TABLET, ORALLY DISINTEGRATING ORAL
COMMUNITY
Start: 2018-05-08 | End: 2019-11-20 | Stop reason: SDUPTHER

## 2018-05-15 NOTE — MR AVS SNAPSHOT
[address] [address] 
[phone] Patient: Mele Augustin MRN:  [id number] Visit Information Date & Time Provider Department Dept. Phone Encounter #  
 5/15/2018  2:00 PM Edward Maravilla MD [facility] [phone] [id number] Follow-up Instructions Return if symptoms worsen or fail to improve. Upcoming Health Maintenance Date Due Influenza Age 5 to Adult 8/1/2018 MEDICARE YEARLY EXAM 12/6/2018 GLAUCOMA SCREENING Q2Y 4/17/2019 COLONOSCOPY 10/6/2021 DTaP/Tdap/Td series (2 - Td) 8/29/2027 Allergies as of 5/15/2018  Review Complete On: 5/15/2018 By: Edward Maravilla MD  
  
 Severity Noted Reaction Type Reactions Sulfa (Sulfonamide Antibiotics)  06/16/2010    Unknown (comments) Current Immunizations  Reviewed on 1/16/2018 Name Date Influenza High Dose Vaccine PF 8/29/2017, 10/17/2016 Influenza Vaccine 8/19/2015, 11/7/2013 Pneumococcal Conjugate (PCV-13) 7/8/2017, 12/8/2015 TD Vaccine 11/17/2006 Tdap 8/29/2017 ZZZ-RETIRED (DO NOT USE) Pneumococcal Vaccine (Unspecified Type) 5/19/2005 Zoster 10/1/2009 Zoster Vaccine, Live 8/2/2016 Not reviewed this visit You Were Diagnosed With   
  
 Codes Comments Nausea    -  Primary ICD-10-CM: R11.0 ICD-9-CM: 787.02 Episode of recurrent major depressive disorder, unspecified depression episode severity ([facility]     ICD-10-CM: F33.9 ICD-9-CM: 296.30 Vitals BP Pulse Temp Resp Height(growth percentile) Weight(growth percentile) 162/71 72 97.5 °F (36.4 °C) (Oral) 17 5' 4\" (1.626 m) 157 lb (71.2 kg) SpO2 BMI Smoking Status 96% 26.95 kg/m2 Former Smoker BMI and BSA Data Body Mass Index Body Surface Area  
 26.95 kg/m 2 1.79 m 2 Preferred Pharmacy Pharmacy Name Phone  [phone] [address] [phone] Your Updated Medication List  
  
   
This list is accurate as of 5/15/18  2:41 PM.  Always use your most recent med list.  
  
  
  
  
 aspirin delayed-release 81 mg tablet Take  by mouth daily. atorvastatin 80 mg tablet Commonly known as:  LIPITOR  
TAKE 1 TABLET BY MOUTH EVERY DAY  
  
 citalopram 10 mg tablet Commonly known as:  Collie Peaks Take 1 Tab by mouth daily. levocetirizine 5 mg tablet Commonly known as:  Angeline Nickels TAKE 1 TABLET BY MOUTH DAILY  
  
 levothyroxine 112 mcg tablet Commonly known as:  SYNTHROID  
TAKE 1 TABLET BY MOUTH DAILY BEFORE BREAKFAST  
  
 lisinopril 20 mg tablet Commonly known as:  PRINIVIL, ZESTRIL  
TAKE 1 TABLET BY MOUTH DAILY  
  
 omega-3s-dha-epa-fish oil-D3 360 mg-1,200 mg -1,000 unit Cap Take  by mouth. ondansetron 4 mg disintegrating tablet Commonly known as:  ZOFRAN ODT  
  
 PROBIOTIC 4X 10-15 mg Tbec Generic drug:  B.infantis-B.ani-B.long-B.bifi Take  by mouth. tolterodine ER 4 mg ER capsule Commonly known as:  Estela Pies Take 1 Cap by mouth daily. Indications: URINARY URGE INCONTINENCE  
  
 TYLENOL 325 mg tablet Generic drug:  acetaminophen Take  by mouth every four (4) hours as needed for Pain. Prescriptions Sent to Pharmacy Refills  
 citalopram (CELEXA) 10 mg tablet 1 Sig: Take 1 Tab by mouth daily. Class: Normal  
 Pharmacy: [facility] [address] [place] [address] [address] [place] [address] #: [phone] Route: Oral  
  
Follow-up Instructions Return if symptoms worsen or fail to improve. Introducing [facility]! [employer] K[employer] introduces [employer] patient portal. Now you can access parts of your medical record, email your doctor's office, and request medication refills online. 1. In your internet browser, go to [email or web address] 2. Click on the First Time User? Click Here link in the Sign In box. You will see the New Member Sign Up page. 3. Enter your Validic Access Code exactly as it appears below. You will not need to use this code after youve completed the sign-up process. If you do not sign up before the expiration date, you must request a new code. · Validic Access Code: [email or web address] Expires: 5/31/2018 12:46 PM 
 
4. Enter the last four digits of your Social Security Number (xxxx) and Date of Birth (mm/dd/yyyy) as indicated and click Submit. You will be taken to the next sign-up page. 5. Create a Validic ID. This will be your Validic login ID and cannot be changed, so think of one that is secure and easy to remember. 6. Create a Validic password. You can change your password at any time. 7. Enter your Password Reset Question and Answer. This can be used at a later time if you forget your password. 8. Enter your e-mail address. You will receive e-mail notification when new information is available in [address] 9. Click Sign Up. You can now view and download portions of your medical record. 10. Click the Download Summary menu link to download a portable copy of your medical information. If you have questions, please visit the Frequently Asked Questions section of the Validic website. Remember, Validic is NOT to be used for urgent needs. For medical emergencies, dial 911. Now available from your iPhone and Android! Please provide this summary of care documentation to your next provider. Your primary care clinician is listed as MIREYA PAULINO. If you have any questions after today's visit, please call [phone].

## 2018-05-15 NOTE — PROGRESS NOTES
Chief Complaint   Patient presents with    Nausea     -X 2 weeks    Diverticulitis     States bowel movements daily/regular. Still having grief issues. 1. Have you been to the ER, urgent care clinic since your last visit? Hospitalized since your last visit? No    2. Have you seen or consulted any other health care providers outside of the [facility] since your last visit? Include any pap smears or colon screening. No      Nausea off and on for two weeks and no pain    Nausea worse with eating      Chief Complaint   Patient presents with    Nausea     -X 2 weeks    Diverticulitis     He is a 80 y.o. male who presents for evalution. Reviewed PmHx, RxHx, FmHx, SocHx, AllgHx and updated and dated in the chart. Patient Active Problem List    Diagnosis    Episode of recurrent major depressive disorder ([facility]    Stress incontinence of urine    Diverticulitis of large intestine with perforation and abscess without bleeding    Ischemic colitis ([facility]    Advance care planning     Has a Lw      Insomnia    Primary hypertension    Acquired hypothyroidism    Mixed hyperlipidemia    Coronary atherosclerosis of native coronary artery    Cancer ([facility]     prostate         Review of Systems - negative except as listed above in the HPI    Objective:     Vitals:    05/15/18 1433   BP: 162/71   Pulse: 72   Resp: 17   Temp: 97.5 °F (36.4 °C)   TempSrc: Oral   SpO2: 96%   Weight: 157 lb (71.2 kg)   Height: 5' 4\" (1.626 m)     Physical Examination: General appearance - alert, well appearing, and in no distress  Chest - clear to auscultation, no wheezes, rales or rhonchi, symmetric air entry  Heart - normal rate, regular rhythm, normal S1, S2, no murmurs, rubs, clicks or gallops  Abdomen - soft, nontender, nondistended, no masses or organomegaly    Assessment/ Plan:   Diagnoses and all orders for this visit:    1. Nausea  -due to celexa  -dec dose from 20 to 10    2.  Episode of recurrent major depressive disorder, unspecified depression episode severity (HCC)  -     citalopram (CELEXA) 10 mg tablet; Take 1 Tab by mouth daily. Follow-up Disposition:  Return if symptoms worsen or fail to improve. I have discussed the diagnosis with the patient and the intended plan as seen in the above orders. The patient understands and agrees with the plan. The patient has received an after-visit summary and questions were answered concerning future plans. Medication Side Effects and Warnings were discussed with patient  Patient Labs were reviewed and or requested:  Patient Past Records were reviewed and or requested    Aby Pena M.D. There are no Patient Instructions on file for this visit.

## 2018-05-16 DIAGNOSIS — E03.9 ACQUIRED HYPOTHYROIDISM: ICD-10-CM

## 2018-05-16 RX ORDER — LEVOTHYROXINE SODIUM 112 UG/1
TABLET ORAL
Qty: 90 TAB | Refills: 0 | Status: SHIPPED | OUTPATIENT
Start: 2018-05-16 | End: 2018-06-06 | Stop reason: SDUPTHER

## 2018-05-29 ENCOUNTER — OFFICE VISIT (OUTPATIENT)
Dept: FAMILY MEDICINE CLINIC | Age: 83
End: 2018-05-29

## 2018-05-29 VITALS
TEMPERATURE: 97.9 F | OXYGEN SATURATION: 98 % | SYSTOLIC BLOOD PRESSURE: 151 MMHG | WEIGHT: 153.38 LBS | DIASTOLIC BLOOD PRESSURE: 87 MMHG | RESPIRATION RATE: 18 BRPM | HEIGHT: 64 IN | BODY MASS INDEX: 26.18 KG/M2 | HEART RATE: 73 BPM

## 2018-05-29 DIAGNOSIS — F33.9 EPISODE OF RECURRENT MAJOR DEPRESSIVE DISORDER, UNSPECIFIED DEPRESSION EPISODE SEVERITY (HCC): Primary | ICD-10-CM

## 2018-05-29 RX ORDER — FLUOXETINE 10 MG/1
10 CAPSULE ORAL DAILY
Qty: 30 CAP | Refills: 5 | Status: SHIPPED | OUTPATIENT
Start: 2018-05-29 | End: 2018-08-27

## 2018-05-29 NOTE — MR AVS SNAPSHOT
[address] [address] 
[phone] Patient: Maria Del Rosario Cardenas MRN:  [id number] Visit Information Date & Time Provider Department Dept. Phone Encounter #  
 5/29/2018 11:00 AM Lucie Beltrán MD [facility] [phone] [id number] Follow-up Instructions Return in about 1 month (around 6/29/2018). Upcoming Health Maintenance Date Due Influenza Age 5 to Adult 8/1/2018 MEDICARE YEARLY EXAM 12/6/2018 GLAUCOMA SCREENING Q2Y 4/17/2019 COLONOSCOPY 10/6/2021 DTaP/Tdap/Td series (2 - Td) 8/29/2027 Allergies as of 5/29/2018  Review Complete On: 5/29/2018 By: Lucie Beltrán MD  
  
 Severity Noted Reaction Type Reactions Sulfa (Sulfonamide Antibiotics)  06/16/2010    Unknown (comments) Current Immunizations  Reviewed on 1/16/2018 Name Date Influenza High Dose Vaccine PF 8/29/2017, 10/17/2016 Influenza Vaccine 8/19/2015, 11/7/2013 Pneumococcal Conjugate (PCV-13) 7/8/2017, 12/8/2015 TD Vaccine 11/17/2006 Tdap 8/29/2017 ZZZ-RETIRED (DO NOT USE) Pneumococcal Vaccine (Unspecified Type) 5/19/2005 Zoster 10/1/2009 Zoster Vaccine, Live 8/2/2016 Not reviewed this visit You Were Diagnosed With   
  
 Codes Comments Episode of recurrent major depressive disorder, unspecified depression episode severity ([facility]    -  Primary ICD-10-CM: F33.9 ICD-9-CM: 296.30 Vitals BP Pulse Temp Resp Height(growth percentile) Weight(growth percentile) 151/87 (BP 1 Location: Left arm, BP Patient Position: Sitting) 73 97.9 °F (36.6 °C) (Oral) 18 5' 4\" (1.626 m) 153 lb 6 oz (69.6 kg) SpO2 BMI Smoking Status 98% 26.33 kg/m2 Former Smoker Vitals History BMI and BSA Data Body Mass Index Body Surface Area  
 26.33 kg/m 2 1.77 m 2 Preferred Pharmacy Pharmacy Name Phone [facility] [facility] [phone] Your Updated Medication List  
  
   
This list is accurate as of 5/29/18 11:56 AM.  Always use your most recent med list.  
  
  
  
  
 aspirin delayed-release 81 mg tablet Take  by mouth daily. atorvastatin 80 mg tablet Commonly known as:  LIPITOR  
TAKE 1 TABLET BY MOUTH EVERY DAY FLUoxetine 10 mg capsule Commonly known as:  PROzac Take 1 Cap by mouth daily. Indications: major depressive disorder  
  
 levocetirizine 5 mg tablet Commonly known as:  Maybelle Kussmaul TAKE 1 TABLET BY MOUTH DAILY  
  
 levothyroxine 112 mcg tablet Commonly known as:  SYNTHROID  
TAKE 1 TABLET BY MOUTH DAILY BEFORE BREAKFAST  
  
 lisinopril 20 mg tablet Commonly known as:  PRINIVIL, ZESTRIL  
TAKE 1 TABLET BY MOUTH DAILY  
  
 omega-3s-dha-epa-fish oil-D3 360 mg-1,200 mg -1,000 unit Cap Take  by mouth. ondansetron 4 mg disintegrating tablet Commonly known as:  ZOFRAN ODT  
  
 PROBIOTIC 4X 10-15 mg Tbec Generic drug:  B.infantis-B.ani-B.long-B.bifi Take  by mouth. tolterodine ER 4 mg ER capsule Commonly known as:  Suella Parent Take 1 Cap by mouth daily. Indications: URINARY URGE INCONTINENCE  
  
 TYLENOL 325 mg tablet Generic drug:  acetaminophen Take  by mouth every four (4) hours as needed for Pain. Prescriptions Sent to Pharmacy Refills FLUoxetine (PROZAC) 10 mg capsule 5 Sig: Take 1 Cap by mouth daily. Indications: major depressive disorder Class: Normal  
 Pharmacy: [address] [place], [place] [address]y [address] [place] [address] #: [phone] Route: Oral  
  
Follow-up Instructions Return in about 1 month (around 6/29/2018). Introducing [facility] & HEALTH SERVICES!    
 [employer] introduces [employer]TukTuk patient portal. Now you can access parts of your medical record, email your doctor's office, and request medication refills online. 1. In your internet browser, go to [email or web address] 2. Click on the First Time User? Click Here link in the Sign In box. You will see the New Member Sign Up page. 3. Enter your PuzzleSocial Access Code exactly as it appears below. You will not need to use this code after youve completed the sign-up process. If you do not sign up before the expiration date, you must request a new code. · PuzzleSocial Access Code: [email or web address] Expires: 5/31/2018 12:46 PM 
 
4. Enter the last four digits of your Social Security Number (xxxx) and Date of Birth (mm/dd/yyyy) as indicated and click Submit. You will be taken to the next sign-up page. 5. Create a PuzzleSocial ID. This will be your PuzzleSocial login ID and cannot be changed, so think of one that is secure and easy to remember. 6. Create a PuzzleSocial password. You can change your password at any time. 7. Enter your Password Reset Question and Answer. This can be used at a later time if you forget your password. 8. Enter your e-mail address. You will receive e-mail notification when new information is available in [address] 9. Click Sign Up. You can now view and download portions of your medical record. 10. Click the Download Summary menu link to download a portable copy of your medical information. If you have questions, please visit the Frequently Asked Questions section of the PuzzleSocial website. Remember, PuzzleSocial is NOT to be used for urgent needs. For medical emergencies, dial 911. Now available from your iPhone and Android! Please provide this summary of care documentation to your next provider. Your primary care clinician is listed as MIREYA PAULINO. If you have any questions after today's visit, please call [phone].

## 2018-05-29 NOTE — PROGRESS NOTES
1. Have you been to the ER, urgent care clinic since your last visit? Hospitalized since your last visit? No    2. Have you seen or consulted any other health care providers outside of the [address] [place] [address] since your last visit? Include any pap smears or colon screening. No   Chief Complaint   Patient presents with    Hypertension     2 wk fuv       Still with nausea with celexa      Chief Complaint   Patient presents with    Hypertension     2 wk fuv     He is a 80 y.o. male who presents for evalution. Reviewed PmHx, RxHx, FmHx, SocHx, AllgHx and updated and dated in the chart. Patient Active Problem List    Diagnosis    Episode of recurrent major depressive disorder ([facility]    Stress incontinence of urine    Diverticulitis of large intestine with perforation and abscess without bleeding    Ischemic colitis ([facility]    Advance care planning     Has a Lw      Insomnia    Primary hypertension    Acquired hypothyroidism    Mixed hyperlipidemia    Coronary atherosclerosis of native coronary artery    Cancer [facility])     prostate         Review of Systems - negative except as listed above in the HPI    Objective:     Vitals:    05/29/18 1128   BP: 151/87   Pulse: 73   Resp: 18   Temp: 97.9 °F (36.6 °C)   TempSrc: Oral   SpO2: 98%   Weight: 153 lb 6 oz (69.6 kg)   Height: 5' 4\" (1.626 m)     Physical Examination: General appearance - alert, well appearing, and in no distress    Assessment/ Plan:   Diagnoses and all orders for this visit:    1. Episode of recurrent major depressive disorder, unspecified depression episode severity (HCC)  -     FLUoxetine (PROZAC) 10 mg capsule; Take 1 Cap by mouth daily. Indications: major depressive disorder  -dc celexa due to nausea       Follow-up Disposition:  Return in about 1 month (around 6/29/2018). I have discussed the diagnosis with the patient and the intended plan as seen in the above orders. The patient understands and agrees with the plan.  The patient has received an after-visit summary and questions were answered concerning future plans. Medication Side Effects and Warnings were discussed with patient  Patient Labs were reviewed and or requested:  Patient Past Records were reviewed and or requested    Autumn Wiley M.D. There are no Patient Instructions on file for this visit.

## 2018-06-03 ENCOUNTER — TELEPHONE (OUTPATIENT)
Dept: FAMILY MEDICINE CLINIC | Age: 83
End: 2018-06-03

## 2018-06-03 NOTE — TELEPHONE ENCOUNTER
Paged on call for refill of ativan, states he called Friday for refill was seen on 5/29/18 and advised Dr Rosenberg Drivers not in the office Friday. Rx for 1mg ativan 1 tab QHS prn anxiety for 5 pills called [facility] to cover him until he can get his Rx refilled.   Pt states he only take 1 per day total.   checked

## 2018-06-05 ENCOUNTER — OFFICE VISIT (OUTPATIENT)
Dept: FAMILY MEDICINE CLINIC | Age: 83
End: 2018-06-05

## 2018-06-05 VITALS
DIASTOLIC BLOOD PRESSURE: 68 MMHG | SYSTOLIC BLOOD PRESSURE: 145 MMHG | RESPIRATION RATE: 20 BRPM | WEIGHT: 154 LBS | HEIGHT: 64 IN | HEART RATE: 81 BPM | BODY MASS INDEX: 26.29 KG/M2 | TEMPERATURE: 98 F

## 2018-06-05 DIAGNOSIS — G47.00 INSOMNIA, UNSPECIFIED TYPE: Primary | ICD-10-CM

## 2018-06-05 RX ORDER — LORAZEPAM 1 MG/1
1 TABLET ORAL
Qty: 30 TAB | Refills: 5 | Status: SHIPPED | OUTPATIENT
Start: 2018-06-05 | End: 2018-10-22 | Stop reason: SDUPTHER

## 2018-06-05 NOTE — MR AVS SNAPSHOT
[address] [address] 
[phone] Patient: Clint Neves MRN:  [id number] Visit Information Date & Time Provider Department Dept. Phone Encounter #  
 6/5/2018  2:10 PM Gregorio Jimenez MD 8[address] [facility] [phone] [id number] Follow-up Instructions Return if symptoms worsen or fail to improve. Upcoming Health Maintenance Date Due Influenza Age 5 to Adult 8/1/2018 MEDICARE YEARLY EXAM 12/6/2018 GLAUCOMA SCREENING Q2Y 4/17/2019 COLONOSCOPY 10/6/2021 DTaP/Tdap/Td series (2 - Td) 8/29/2027 Allergies as of 6/5/2018  Review Complete On: 6/5/2018 By: Gregorio Jimenez MD  
  
 Severity Noted Reaction Type Reactions Sulfa (Sulfonamide Antibiotics)  06/16/2010    Unknown (comments) Current Immunizations  Reviewed on 1/16/2018 Name Date Influenza High Dose Vaccine PF 8/29/2017, 10/17/2016 Influenza Vaccine 8/19/2015, 11/7/2013 Pneumococcal Conjugate (PCV-13) 7/8/2017, 12/8/2015 TD Vaccine 11/17/2006 Tdap 8/29/2017 ZZZ-RETIRED (DO NOT USE) Pneumococcal Vaccine (Unspecified Type) 5/19/2005 Zoster 10/1/2009 Zoster Vaccine, Live 8/2/2016 Not reviewed this visit You Were Diagnosed With   
  
 Codes Comments Insomnia, unspecified type    -  Primary ICD-10-CM: G47.00 ICD-9-CM: 780.52 Vitals BP Pulse Temp Resp Height(growth percentile) Weight(growth percentile) 145/68 81 98 °F (36.7 °C) 20 5' 4\" (1.626 m) 154 lb (69.9 kg) BMI Smoking Status 26.43 kg/m2 Former Smoker Vitals History BMI and BSA Data Body Mass Index Body Surface Area  
 26.43 kg/m 2 1.78 m 2 Preferred Pharmacy Pharmacy Name Phone [facility] [address] [place] [address], [address] [place] [address] [phone] Your Updated Medication List  
  
   
 This list is accurate as of 6/5/18  2:34 PM.  Always use your most recent med list.  
  
  
  
  
 aspirin delayed-release 81 mg tablet Take  by mouth daily. atorvastatin 80 mg tablet Commonly known as:  LIPITOR  
TAKE 1 TABLET BY MOUTH EVERY DAY FLUoxetine 10 mg capsule Commonly known as:  PROzac Take 1 Cap by mouth daily. Indications: major depressive disorder  
  
 levocetirizine 5 mg tablet Commonly known as:  Eleazar Garden TAKE 1 TABLET BY MOUTH DAILY  
  
 levothyroxine 112 mcg tablet Commonly known as:  SYNTHROID  
TAKE 1 TABLET BY MOUTH DAILY BEFORE BREAKFAST  
  
 lisinopril 20 mg tablet Commonly known as:  PRINIVIL, ZESTRIL  
TAKE 1 TABLET BY MOUTH DAILY LORazepam 1 mg tablet Commonly known as:  ATIVAN Take 1 Tab by mouth nightly. Max Daily Amount: 1 mg. Indications: Insomnia  
  
 omega-3s-dha-epa-fish oil-D3 360 mg-1,200 mg -1,000 unit Cap Take  by mouth. ondansetron 4 mg disintegrating tablet Commonly known as:  ZOFRAN ODT  
  
 PROBIOTIC 4X 10-15 mg Tbec Generic drug:  B.infantis-B.ani-B.long-B.bifi Take  by mouth. tolterodine ER 4 mg ER capsule Commonly known as:  Neftaly Dadds Take 1 Cap by mouth daily. Indications: URINARY URGE INCONTINENCE  
  
 TYLENOL 325 mg tablet Generic drug:  acetaminophen Take  by mouth every four (4) hours as needed for Pain. Prescriptions Printed Refills LORazepam (ATIVAN) 1 mg tablet 5 Sig: Take 1 Tab by mouth nightly. Max Daily Amount: 1 mg. Indications: Insomnia Class: Print Route: Oral  
  
Follow-up Instructions Return if symptoms worsen or fail to improve. Introducing [facility] & HEALTH SERVICES! [employer] Flor introduces [employer] patient portal. Now you can access parts of your medical record, email your doctor's office, and request medication refills online. 1. In your internet browser, go to [email or web address] 2. Click on the First Time User? Click Here link in the Sign In box. You will see the New Member Sign Up page. 3. Enter your Biogazelle Access Code exactly as it appears below. You will not need to use this code after youve completed the sign-up process. If you do not sign up before the expiration date, you must request a new code. · Bi[employer]azelle Access Code: [email or web address] Expires: 9/3/2018  2:33 PM 
 
4. Enter the last four digits of your Social Security Number (xxxx) and Date of Birth (mm/dd/yyyy) as indicated and click Submit. You will be taken to the next sign-up page. 5. Create a Biogazelle ID. This will be your Biogazelle login ID and cannot be changed, so think of one that is secure and easy to remember. 6. Create a Biogazelle password. You can change your password at any time. 7. Enter your Password Reset Question and Answer. This can be used at a later time if you forget your password. 8. Enter your e-mail address. You will receive e-mail notification when new information is available in [address] 9. Click Sign Up. You can now view and download portions of your medical record. 10. Click the Download Summary menu link to download a portable copy of your medical information. If you have questions, please visit the Frequently Asked Questions section of the Biogazelle website. Remember, Biogazelle is NOT to be used for urgent needs. For medical emergencies, dial 911. Now available from your iPhone and Android! Please provide this summary of care documentation to your next provider. Your primary care clinician is listed as MIREYA PAULINO. If you have any questions after today's visit, please call [phone].

## 2018-06-05 NOTE — PROGRESS NOTES
Patient here for med change. Would like to discuss getting put on ativan. He states the medication fluoxetine makes him dizzy. 1. Have you been to the ER, urgent care clinic since your last visit? Hospitalized since your last visit? No    2. Have you seen or consulted any other health care providers outside of the [facility] since your last visit? Include any pap smears or colon screening. No       Chief Complaint   Patient presents with    Medication Evaluation     change meds, ativan     He is a 80 y.o. male who presents for evalution. Reviewed PmHx, RxHx, FmHx, SocHx, AllgHx and updated and dated in the chart. Patient Active Problem List    Diagnosis    Episode of recurrent major depressive disorder ([facility]    Stress incontinence of urine    Diverticulitis of large intestine with perforation and abscess without bleeding    Ischemic colitis ([facility]    Advance care planning     Has a Lw      Insomnia    Primary hypertension    Acquired hypothyroidism    Mixed hyperlipidemia    Coronary atherosclerosis of native coronary artery    Cancer ([facility]     prostate         Review of Systems - negative except as listed above in the HPI    Objective:     Vitals:    06/05/18 1424   BP: 145/68   Pulse: 81   Resp: 20   Temp: 98 °F (36.7 °C)   Weight: 154 lb (69.9 kg)   Height: 5' 4\" (1.626 m)     Physical Examination: General appearance - alert, well appearing, and in no distress      Assessment/ Plan:   Diagnoses and all orders for this visit:    1. Insomnia, unspecified type  -     LORazepam (ATIVAN) 1 mg tablet; Take 1 Tab by mouth nightly. Max Daily Amount: 1 mg. Indications: Insomnia  -pt to take at bedtime and stick with the prozac     Follow-up Disposition:  Return if symptoms worsen or fail to improve. I have discussed the diagnosis with the patient and the intended plan as seen in the above orders. The patient understands and agrees with the plan.  The patient has received an after-visit summary and questions were answered concerning future plans. Medication Side Effects and Warnings were discussed with patient  Patient Labs were reviewed and or requested:  Patient Past Records were reviewed and or requested    Robert Neely M.D. There are no Patient Instructions on file for this visit.

## 2018-06-06 ENCOUNTER — TELEPHONE (OUTPATIENT)
Dept: FAMILY MEDICINE CLINIC | Age: 83
End: 2018-06-06

## 2018-06-06 DIAGNOSIS — E03.9 ACQUIRED HYPOTHYROIDISM: ICD-10-CM

## 2018-06-06 RX ORDER — LEVOTHYROXINE SODIUM 112 UG/1
TABLET ORAL
Qty: 90 TAB | Refills: 0 | Status: SHIPPED | OUTPATIENT
Start: 2018-06-06 | End: 2018-10-22

## 2018-06-06 NOTE — TELEPHONE ENCOUNTER
[facility] pharmacists called and states patient is there for refill of ativan. I explained that patient came in to office yesterday and Dr. Christopher Hughes printed a Rx and gave to patient. Patient at pharmacy now and does not have the rx. Verbal order given to pharmacist but states she told the patient to go and look for prescription in paperwork that  Dr. Christopher Hughes gave him. Verbal given to pharmacist in case patient does not have rx.

## 2018-06-25 DIAGNOSIS — I10 PRIMARY HYPERTENSION: ICD-10-CM

## 2018-06-25 RX ORDER — LISINOPRIL 20 MG/1
TABLET ORAL
Qty: 90 TAB | Refills: 1 | Status: SHIPPED | OUTPATIENT
Start: 2018-06-25 | End: 2018-10-10 | Stop reason: SDUPTHER

## 2018-08-27 ENCOUNTER — HOSPITAL ENCOUNTER (OUTPATIENT)
Dept: LAB | Age: 83
Discharge: HOME | End: 2018-08-27
Payer: MEDICARE

## 2018-08-27 ENCOUNTER — OFFICE VISIT (OUTPATIENT)
Dept: FAMILY MEDICINE CLINIC | Age: 83
End: 2018-08-27

## 2018-08-27 VITALS
OXYGEN SATURATION: 97 % | DIASTOLIC BLOOD PRESSURE: 67 MMHG | BODY MASS INDEX: 27.66 KG/M2 | RESPIRATION RATE: 15 BRPM | HEIGHT: 64 IN | HEART RATE: 71 BPM | TEMPERATURE: 97.4 F | WEIGHT: 162 LBS | SYSTOLIC BLOOD PRESSURE: 156 MMHG

## 2018-08-27 DIAGNOSIS — F51.01 PRIMARY INSOMNIA: Primary | ICD-10-CM

## 2018-08-27 DIAGNOSIS — K21.9 GASTROESOPHAGEAL REFLUX DISEASE WITHOUT ESOPHAGITIS: ICD-10-CM

## 2018-08-27 DIAGNOSIS — I10 PRIMARY HYPERTENSION: ICD-10-CM

## 2018-08-27 DIAGNOSIS — E03.9 ACQUIRED HYPOTHYROIDISM: ICD-10-CM

## 2018-08-27 PROCEDURE — 85025 COMPLETE CBC W/AUTO DIFF WBC: CPT

## 2018-08-27 PROCEDURE — 80053 COMPREHEN METABOLIC PANEL: CPT

## 2018-08-27 PROCEDURE — 84443 ASSAY THYROID STIM HORMONE: CPT

## 2018-08-27 RX ORDER — PANTOPRAZOLE SODIUM 40 MG/1
40 TABLET, DELAYED RELEASE ORAL DAILY
Qty: 30 TAB | Refills: 5 | Status: SHIPPED | OUTPATIENT
Start: 2018-08-27 | End: 2018-10-22

## 2018-08-27 RX ORDER — CITALOPRAM 20 MG/1
TABLET, FILM COATED ORAL
COMMUNITY
Start: 2018-05-28 | End: 2018-08-27

## 2018-08-27 RX ORDER — TRAZODONE HYDROCHLORIDE 50 MG/1
50 TABLET ORAL
Qty: 30 TAB | Refills: 5 | Status: SHIPPED | OUTPATIENT
Start: 2018-08-27 | End: 2018-10-22

## 2018-08-27 NOTE — MR AVS SNAPSHOT
[address] [place] [address] 
[phone] Patient: Shannon Leone MRN:  [id number] Visit Information Date & Time Provider Department Dept. Phone Encounter #  
 8/27/2018  2:45 PM Merlin Barnett MD 77[address] [facility] [phone] [id number] Follow-up Instructions Return if symptoms worsen or fail to improve. Upcoming Health Maintenance Date Due Influenza Age 5 to Adult 8/1/2018 MEDICARE YEARLY EXAM 12/6/2018 GLAUCOMA SCREENING Q2Y 4/17/2019 COLONOSCOPY 10/6/2021 DTaP/Tdap/Td series (2 - Td) 8/29/2027 Allergies as of 8/27/2018  Review Complete On: 8/27/2018 By: Merlin Barnett MD  
  
 Severity Noted Reaction Type Reactions Sulfa (Sulfonamide Antibiotics)  06/16/2010    Unknown (comments) Current Immunizations  Reviewed on 1/16/2018 Name Date Influenza High Dose Vaccine PF 8/29/2017, 10/17/2016 Influenza Vaccine 8/19/2015, 11/7/2013 Pneumococcal Conjugate (PCV-13) 7/8/2017, 12/8/2015 TD Vaccine 11/17/2006 Tdap 8/29/2017 ZZZ-RETIRED (DO NOT USE) Pneumococcal Vaccine (Unspecified Type) 5/19/2005 Zoster 10/1/2009 Zoster Vaccine, Live 8/2/2016 Not reviewed this visit You Were Diagnosed With   
  
 Codes Comments Primary insomnia    -  Primary ICD-10-CM: F51.01 
ICD-9-CM: 307.42 Acquired hypothyroidism     ICD-10-CM: E03.9 ICD-9-CM: 244.9 Primary hypertension     ICD-10-CM: I10 
ICD-9-CM: 401.9 Gastroesophageal reflux disease without esophagitis     ICD-10-CM: K21.9 ICD-9-CM: 530.81 Vitals BP Pulse Temp Resp Height(growth percentile) Weight(growth percentile) 156/67 71 97.4 °F (36.3 °C) (Oral) 15 5' 4\" (1.626 m) 162 lb (73.5 kg) SpO2 BMI Smoking Status 97% 27.81 kg/m2 Former Smoker BMI and BSA Data Body Mass Index Body Surface Area  
 27.81 kg/m 2 1.82 m 2 Preferred Pharmacy Pharmacy Name Phone [facility] [address] [facility], Caron Marinelli [address] [place] [address] [phone] Your Updated Medication List  
  
   
This list is accurate as of 8/27/18  3:16 PM.  Always use your most recent med list.  
  
  
  
  
 aspirin delayed-release 81 mg tablet Take  by mouth daily. atorvastatin 80 mg tablet Commonly known as:  LIPITOR  
TAKE 1 TABLET BY MOUTH EVERY DAY  
  
 levocetirizine 5 mg tablet Commonly known as:  Buster Loss TAKE 1 TABLET BY MOUTH DAILY  
  
 levothyroxine 112 mcg tablet Commonly known as:  SYNTHROID  
TAKE 1 TABLET BY MOUTH DAILY BEFORE BREAKFAST  
  
 lisinopril 20 mg tablet Commonly known as:  PRINIVIL, ZESTRIL  
TAKE 1 TABLET BY MOUTH DAILY LORazepam 1 mg tablet Commonly known as:  ATIVAN Take 1 Tab by mouth nightly. Max Daily Amount: 1 mg. Indications: Insomnia  
  
 omega-3s-dha-epa-fish oil-D3 360 mg-1,200 mg -1,000 unit Cap Take  by mouth. ondansetron 4 mg disintegrating tablet Commonly known as:  ZOFRAN ODT  
  
 pantoprazole 40 mg tablet Commonly known as:  PROTONIX Take 1 Tab by mouth daily. Indications: Heartburn PROBIOTIC 4X 10-15 mg Tbec Generic drug:  B.infantis-B.ani-B.long-B.bifi Take  by mouth. tolterodine ER 4 mg ER capsule Commonly known as:  Berlin Mayito Take 1 Cap by mouth daily. Indications: URINARY URGE INCONTINENCE  
  
 traZODone 50 mg tablet Commonly known as:  [place] Dessert Take 1 Tab by mouth nightly. TYLENOL 325 mg tablet Generic drug:  acetaminophen Take  by mouth every four (4) hours as needed for Pain. Prescriptions Sent to Pharmacy Refills  
 pantoprazole (PROTONIX) 40 mg tablet 5 Sig: Take 1 Tab by mouth daily. Indications: Heartburn Class: Normal  
 Pharmacy: [facility] [address] [facility], [address] [place] [address] Ph #: [phone]  Route: Oral  
 traZODone (DESYREL) 50 mg tablet 5 Sig: Take 1 Tab by mouth nightly. Class: Normal  
 Pharmacy: [facility] [address] [place] [address], [address] [address] [address] [facility] #: [phone] Route: Oral  
  
We Performed the Following CBC WITH AUTOMATED DIFF [61356 CPT(R)] METABOLIC PANEL, COMPREHENSIVE [52266 CPT(R)] TSH 3RD GENERATION [08096 CPT(R)] Follow-up Instructions Return if symptoms worsen or fail to improve. Introducing [facility] HEALTH SERVICES! [facility] introduces [employer] patient portal. Now you can access parts of your medical record, email your doctor's office, and request medication refills online. 1. In your internet browser, go to [email or web address]/[employer] Products [email or web address] 2. Click on the First Time User? Click Here link in the Sign In box. You will see the New Member Sign Up page. 3. Enter your [employer] Access Code exactly as it appears below. You will not need to use this code after youve completed the sign-up process. If you do not sign up before the expiration date, you must request a new code. · [employer] Access Code: [email or web address] Expires: 9/3/2018  2:33 PM 
 
4. Enter the last four digits of your Social Security Number (xxxx) and Date of Birth (mm/dd/yyyy) as indicated and click Submit. You will be taken to the next sign-up page. 5. Create a [employer] ID. This will be your [employer] login ID and cannot be changed, so think of one that is secure and easy to remember. 6. Create a [employer] password. You can change your password at any time. 7. Enter your Password Reset Question and Answer. This can be used at a later time if you forget your password. 8. Enter your e-mail address. You will receive e-mail notification when new information is available in [address] 9. Click Sign Up. You can now view and download portions of your medical record.  
10. Click the Download Summary menu link to download a portable copy of your medical information. If you have questions, please visit the Frequently Asked Questions section of the Muzeek website. Remember, [employer] is NOT to be used for urgent needs. For medical emergencies, dial 911. Now available from your iPhone and Android! Please provide this summary of care documentation to your next provider. Your primary care clinician is listed as MIREYA PAULINO. If you have any questions after today's visit, please call [phone].

## 2018-08-27 NOTE — PROGRESS NOTES
Chief Complaint   Patient presents with    Abdominal Pain     spicy foods    Sleep Problem     trouble staying asleep    Bereavement Counseling     2 years ago June 1. Have you been to the ER, urgent care clinic since your last visit? Hospitalized since your last visit? No    2. Have you seen or consulted any other health care providers outside of the [address] [place] [address] since your last visit? Include any pap smears or colon screening. No        Chief Complaint   Patient presents with    Abdominal Pain     spicy foods    Sleep Problem     trouble staying asleep    Bereavement Counseling     2 years ago June     He is a 80 y.o. male who presents for evalution. Reviewed PmHx, RxHx, FmHx, SocHx, AllgHx and updated and dated in the chart. Patient Active Problem List    Diagnosis    [facility]    Stress incontinence of urine    Diverticulitis of large intestine with perforation and abscess without bleeding    Ischemic colitis ([facility]    Advance care planning     Has a Lw      Insomnia    Primary hypertension    Acquired hypothyroidism    Mixed hyperlipidemia    Coronary atherosclerosis of native coronary artery    Cancer ([facility]     prostate         Review of Systems - negative except as listed above in the HPI    Objective:     Vitals:    08/27/18 1503   BP: 156/67   Pulse: 71   Resp: 15   Temp: 97.4 °F (36.3 °C)   TempSrc: Oral   SpO2: 97%   Weight: 162 lb (73.5 kg)   Height: 5' 4\" (1.626 m)     Physical Examination: General appearance - alert, well appearing, and in no distress  Neck - supple, no significant adenopathy  Chest - clear to auscultation, no wheezes, rales or rhonchi, symmetric air entry  Heart - normal rate, regular rhythm, normal S1, S2, no murmurs, rubs, clicks or gallops  Abdomen - soft, nontender, nondistended, no masses or organomegaly    Assessment/ Plan:   Diagnoses and all orders for this visit:    1.  Primary insomnia  - traZODone (DESYREL) 50 mg tablet; Take 1 Tab by mouth nightly.  -add rx    2. Acquired hypothyroidism  -     TSH 3RD GENERATION    3. Primary hypertension  -     METABOLIC PANEL, COMPREHENSIVE  -     CBC WITH AUTOMATED DIFF  -      4. GERD:  Add rx   pantoprazole (PROTONIX) 40 mg tablet; Take 1 Tab by mouth daily. Indications: Heartburn       Follow-up Disposition:  Return if symptoms worsen or fail to improve. I have discussed the diagnosis with the patient and the intended plan as seen in the above orders. The patient understands and agrees with the plan. The patient has received an after-visit summary and questions were answered concerning future plans. Medication Side Effects and Warnings were discussed with patient  Patient Labs were reviewed and or requested:  Patient Past Records were reviewed and or requested    Tamiko Brooks M.D. There are no Patient Instructions on file for this visit.

## 2018-08-29 LAB
ALBUMIN SERPL-MCNC: 4.1 G/DL (ref 3.5–4.7)
ALBUMIN/GLOB SERPL: 1.1 {RATIO} (ref 1.2–2.2)
ALP SERPL-CCNC: 93 IU/L (ref 39–117)
ALT SERPL-CCNC: 7 IU/L (ref 0–44)
AST SERPL-CCNC: 17 IU/L (ref 0–40)
BASOPHILS # BLD AUTO: NORMAL 10*3/UL
BILIRUB SERPL-MCNC: <0.2 MG/DL (ref 0–1.2)
BUN SERPL-MCNC: 24 MG/DL (ref 8–27)
BUN/CREAT SERPL: 16 (ref 10–24)
CALCIUM SERPL-MCNC: 9 MG/DL (ref 8.6–10.2)
CHLORIDE SERPL-SCNC: 102 MMOL/L (ref 96–106)
CO2 SERPL-SCNC: 13 MMOL/L (ref 20–29)
CREAT SERPL-MCNC: 1.5 MG/DL (ref 0.76–1.27)
EOSINOPHIL # BLD AUTO: NORMAL 10*3/UL
EOSINOPHIL NFR BLD AUTO: NORMAL %
GLOBULIN SER CALC-MCNC: 3.6 G/DL (ref 1.5–4.5)
GLUCOSE SERPL-MCNC: 102 MG/DL (ref 65–99)
HCT VFR BLD AUTO: NORMAL %
HGB BLD-MCNC: NORMAL G/DL
INTERPRETATION: NORMAL
LYMPHOCYTES # BLD AUTO: NORMAL 10*3/UL
LYMPHOCYTES NFR BLD AUTO: NORMAL %
MONOCYTES NFR BLD AUTO: NORMAL %
NEUTROPHILS NFR BLD AUTO: NORMAL %
PLATELET # BLD AUTO: NORMAL 10*3/UL
POTASSIUM SERPL-SCNC: 5.6 MMOL/L (ref 3.5–5.2)
PROT SERPL-MCNC: 7.7 G/DL (ref 6–8.5)
RBC # BLD AUTO: NORMAL 10*6/UL
SODIUM SERPL-SCNC: 139 MMOL/L (ref 134–144)
TSH SERPL DL<=0.005 MIU/L-ACNC: 3.65 UIU/ML (ref 0.45–4.5)
WBC # BLD AUTO: NORMAL X10E3/UL

## 2018-08-29 NOTE — PROGRESS NOTES
Called and spoke to patient. Alessio Hebert) Pt states understanding per Dr Analy Vasquez:  Cr and K are elevated--rto nonfasting and inc water to recheck. Pt scheduled to RTO 9/4/18 for non fasting labs, and will increase H2O intake.

## 2018-09-04 ENCOUNTER — OFFICE VISIT (OUTPATIENT)
Dept: FAMILY MEDICINE CLINIC | Age: 83
End: 2018-09-04

## 2018-09-04 ENCOUNTER — HOSPITAL ENCOUNTER (OUTPATIENT)
Dept: LAB | Age: 83
Discharge: HOME | End: 2018-09-04
Payer: MEDICARE

## 2018-09-04 VITALS
BODY MASS INDEX: 27.49 KG/M2 | HEIGHT: 64 IN | OXYGEN SATURATION: 97 % | RESPIRATION RATE: 16 BRPM | TEMPERATURE: 97.5 F | DIASTOLIC BLOOD PRESSURE: 66 MMHG | SYSTOLIC BLOOD PRESSURE: 153 MMHG | HEART RATE: 76 BPM | WEIGHT: 161 LBS

## 2018-09-04 DIAGNOSIS — R79.89 CREATININE ELEVATION: Primary | ICD-10-CM

## 2018-09-04 DIAGNOSIS — M54.5 ACUTE LOW BACK PAIN, UNSPECIFIED BACK PAIN LATERALITY, WITH SCIATICA PRESENCE UNSPECIFIED: ICD-10-CM

## 2018-09-04 PROCEDURE — 80048 BASIC METABOLIC PNL TOTAL CA: CPT

## 2018-09-04 PROCEDURE — 85025 COMPLETE CBC W/AUTO DIFF WBC: CPT

## 2018-09-04 RX ORDER — DICLOFENAC SODIUM 10 MG/G
4 GEL TOPICAL 4 TIMES DAILY
Qty: 100 G | Refills: 1 | Status: SHIPPED | OUTPATIENT
Start: 2018-09-04 | End: 2018-10-22

## 2018-09-04 NOTE — MR AVS SNAPSHOT
[address] [address] 
[phone] Patient: Juan Vernon MRN:  [id number] Visit Information Date & Time Provider Department Dept. Phone Encounter #  
 9/4/2018  3:00 PM Alice England MD [facility] [phone] [id number] Follow-up Instructions Return if symptoms worsen or fail to improve. Upcoming Health Maintenance Date Due Influenza Age 5 to Adult 5/1/2019* MEDICARE YEARLY EXAM 12/6/2018 GLAUCOMA SCREENING Q2Y 4/17/2019 COLONOSCOPY 10/6/2021 DTaP/Tdap/Td series (2 - Td) 8/29/2027 *Topic was postponed. The date shown is not the original due date. Allergies as of 9/4/2018  Review Complete On: 9/4/2018 By: Alice England MD  
  
 Severity Noted Reaction Type Reactions Sulfa (Sulfonamide Antibiotics)  06/16/2010    Unknown (comments) Current Immunizations  Reviewed on 1/16/2018 Name Date Influenza High Dose Vaccine PF 8/29/2017, 10/17/2016 Influenza Vaccine 8/19/2015, 11/7/2013 Pneumococcal Conjugate (PCV-13) 7/8/2017, 12/8/2015 TD Vaccine 11/17/2006 Tdap 8/29/2017 ZZZ-RETIRED (DO NOT USE) Pneumococcal Vaccine (Unspecified Type) 5/19/2005 Zoster 10/1/2009 Zoster Vaccine, Live 8/2/2016 Not reviewed this visit You Were Diagnosed With   
  
 Codes Comments Creatinine elevation    -  Primary ICD-10-CM: R79.89 ICD-9-CM: 790.99 Acute low back pain, unspecified back pain laterality, with sciatica presence unspecified     ICD-10-CM: M54.5 ICD-9-CM: 724.2 Vitals BP Pulse Temp Resp Height(growth percentile) Weight(growth percentile) 153/66 76 97.5 °F (36.4 °C) (Oral) 16 5' 4\" (1.626 m) 161 lb (73 kg) SpO2 BMI Smoking Status 97% 27.64 kg/m2 Former Smoker BMI and BSA Data Body Mass Index Body Surface Area  
 27.64 kg/m 2 1.82 m 2 Preferred Pharmacy Pharmacy Name Phone [facility] [facility] [address] [place] [address] [phone] Your Updated Medication List  
  
   
This list is accurate as of 9/4/18  3:46 PM.  Always use your most recent med list.  
  
  
  
  
 aspirin delayed-release 81 mg tablet Take  by mouth daily. atorvastatin 80 mg tablet Commonly known as:  LIPITOR  
TAKE 1 TABLET BY MOUTH EVERY DAY  
  
 diclofenac 1 % Gel Commonly known as:  VOLTAREN Apply 4 g to affected area four (4) times daily. back  
  
 levocetirizine 5 mg tablet Commonly known as:  Saira Nails TAKE 1 TABLET BY MOUTH DAILY  
  
 levothyroxine 112 mcg tablet Commonly known as:  SYNTHROID  
TAKE 1 TABLET BY MOUTH DAILY BEFORE BREAKFAST  
  
 lisinopril 20 mg tablet Commonly known as:  PRINIVIL, ZESTRIL  
TAKE 1 TABLET BY MOUTH DAILY LORazepam 1 mg tablet Commonly known as:  ATIVAN Take 1 Tab by mouth nightly. Max Daily Amount: 1 mg. Indications: Insomnia  
  
 omega-3s-dha-epa-fish oil-D3 360 mg-1,200 mg -1,000 unit Cap Take  by mouth. ondansetron 4 mg disintegrating tablet Commonly known as:  ZOFRAN ODT  
  
 pantoprazole 40 mg tablet Commonly known as:  PROTONIX Take 1 Tab by mouth daily. Indications: Heartburn PROBIOTIC 4X 10-15 mg Tbec Generic drug:  B.infantis-B.ani-B.long-B.bifi Take  by mouth. tolterodine ER 4 mg ER capsule Commonly known as:  Berlin Polanco Take 1 Cap by mouth daily. Indications: URINARY URGE INCONTINENCE  
  
 traZODone 50 mg tablet Commonly known as:  Jonathan Blaine Take 1 Tab by mouth nightly. TYLENOL 325 mg tablet Generic drug:  acetaminophen Take  by mouth every four (4) hours as needed for Pain. Prescriptions Sent to Pharmacy Refills  
 diclofenac (VOLTAREN) 1 % gel 1 Sig: Apply 4 g to affected area four (4) times daily. back  Class: Normal  
 Pharmacy: [address] [place] [address], [address] [place] [address] [address] [facility] #: [phone] Route: Topical  
  
We Performed the Following CBC WITH AUTOMATED DIFF [47176 CPT(R)] METABOLIC PANEL, BASIC [59487 CPT(R)] Follow-up Instructions Return if symptoms worsen or fail to improve. Introducing [facility]! Carina Elaine introduces [employer] patient portal. Now you can access parts of your medical record, email your doctor's office, and request medication refills online. 1. In your internet browser, go to [email or web address] 2. Click on the First Time User? Click Here link in the Sign In box. You will see the New Member Sign Up page. 3. Enter your [employer] Access Code exactly as it appears below. You will not need to use this code after youve completed the sign-up process. If you do not sign up before the expiration date, you must request a new code. · [employer] Access Code: [email or web address] Expires: 12/3/2018  3:46 PM 
 
4. Enter the last four digits of your Social Security Number (xxxx) and Date of Birth (mm/dd/yyyy) as indicated and click Submit. You will be taken to the next sign-up page. 5. Create a [employer] ID. This will be your [employer] login ID and cannot be changed, so think of one that is secure and easy to remember. 6. Create a [employer] password. You can change your password at any time. 7. Enter your Password Reset Question and Answer. This can be used at a later time if you forget your password. 8. Enter your e-mail address. You will receive e-mail notification when new information is available in [address] 9. Click Sign Up. You can now view and download portions of your medical record. 10. Click the Download Summary menu link to download a portable copy of your medical information. If you have questions, please visit the Frequently Asked Questions section of the [employer] website. Remember, [employer] is NOT to be used for urgent needs. For medical emergencies, dial 911. Now available from your iPhone and Android! Please provide this summary of care documentation to your next provider. Your primary care clinician is listed as MIREYA PAULINO. If you have any questions after today's visit, please call [phone].

## 2018-09-04 NOTE — PROGRESS NOTES
Chief Complaint   Patient presents with    LOW BACK PAIN     with rotation. Relieved by heat application.  Labs     Non-fasting     1. Have you been to the ER, urgent care clinic since your last visit? Hospitalized since your last visit? No    2. Have you seen or consulted any other health care providers outside of the [facility] since your last visit? Include any pap smears or colon screening. No      Chief Complaint   Patient presents with    LOW BACK PAIN     with rotation. Relieved by heat application.  Labs     Non-fasting     He is a 80 y.o. male who presents for evalution. Reviewed PmHx, RxHx, FmHx, SocHx, AllgHx and updated and dated in the chart. Patient Active Problem List    Diagnosis    Episode of recurrent major depressive disorder [facility]    Stress incontinence of urine    Diverticulitis of large intestine with perforation and abscess without bleeding    Ischemic colitis ([facility]    Advance care planning     Has a Lw      Insomnia    Primary hypertension    Acquired hypothyroidism    Mixed hyperlipidemia    Coronary atherosclerosis of native coronary artery    Cancer [facility])     prostate         Review of Systems - negative except as listed above in the HPI    Objective:     Vitals:    09/04/18 1523   BP: 153/66   Pulse: 76   Resp: 16   Temp: 97.5 °F (36.4 °C)   TempSrc: Oral   SpO2: 97%   Weight: 161 lb (73 kg)   Height: 5' 4\" (1.626 m)     Physical Examination: General appearance - alert, well appearing, and in no distress  Chest - clear to auscultation, no wheezes, rales or rhonchi, symmetric air entry  Heart - normal rate, regular rhythm, normal S1, S2, no murmurs, rubs, clicks or gallops  Back exam - limited range of motion, pain with motion noted during exam, tenderness noted left lumbar        Assessment/ Plan:   Diagnoses and all orders for this visit:    1. Creatinine elevation  -     METABOLIC PANEL, BASIC  -     CBC WITH AUTOMATED DIFF    2.  Acute low back pain, unspecified back pain laterality, with sciatica presence unspecified  -     diclofenac (VOLTAREN) 1 % gel; Apply 4 g to affected area four (4) times daily. back       Follow-up Disposition:  Return if symptoms worsen or fail to improve. I have discussed the diagnosis with the patient and the intended plan as seen in the above orders. The patient understands and agrees with the plan. The patient has received an after-visit summary and questions were answered concerning future plans. Medication Side Effects and Warnings were discussed with patient  Patient Labs were reviewed and or requested:  Patient Past Records were reviewed and or requested    Castro Stark M.D. There are no Patient Instructions on file for this visit.

## 2018-09-05 LAB
BASOPHILS # BLD AUTO: 0 X10E3/UL (ref 0–0.2)
BASOPHILS NFR BLD AUTO: 0 %
BUN SERPL-MCNC: 26 MG/DL (ref 8–27)
BUN/CREAT SERPL: 25 (ref 10–24)
CALCIUM SERPL-MCNC: 8.9 MG/DL (ref 8.6–10.2)
CHLORIDE SERPL-SCNC: 95 MMOL/L (ref 96–106)
CO2 SERPL-SCNC: 22 MMOL/L (ref 20–29)
CREAT SERPL-MCNC: 1.06 MG/DL (ref 0.76–1.27)
EOSINOPHIL # BLD AUTO: 0.1 X10E3/UL (ref 0–0.4)
EOSINOPHIL NFR BLD AUTO: 1 %
ERYTHROCYTE [DISTWIDTH] IN BLOOD BY AUTOMATED COUNT: 14 % (ref 12.3–15.4)
GLUCOSE SERPL-MCNC: 70 MG/DL (ref 65–99)
HCT VFR BLD AUTO: 39.6 % (ref 37.5–51)
HGB BLD-MCNC: 13 G/DL (ref 13–17.7)
IMM GRANULOCYTES # BLD: 0 X10E3/UL (ref 0–0.1)
IMM GRANULOCYTES NFR BLD: 0 %
LYMPHOCYTES # BLD AUTO: 2 X10E3/UL (ref 0.7–3.1)
LYMPHOCYTES NFR BLD AUTO: 24 %
MCH RBC QN AUTO: 28.9 PG (ref 26.6–33)
MCHC RBC AUTO-ENTMCNC: 32.8 G/DL (ref 31.5–35.7)
MCV RBC AUTO: 88 FL (ref 79–97)
MONOCYTES # BLD AUTO: 1 X10E3/UL (ref 0.1–0.9)
MONOCYTES NFR BLD AUTO: 12 %
NEUTROPHILS # BLD AUTO: 5 X10E3/UL (ref 1.4–7)
NEUTROPHILS NFR BLD AUTO: 63 %
PLATELET # BLD AUTO: 241 X10E3/UL (ref 150–379)
POTASSIUM SERPL-SCNC: 4.9 MMOL/L (ref 3.5–5.2)
RBC # BLD AUTO: 4.5 X10E6/UL (ref 4.14–5.8)
SODIUM SERPL-SCNC: 135 MMOL/L (ref 134–144)
WBC # BLD AUTO: 8.1 X10E3/UL (ref 3.4–10.8)

## 2018-09-05 NOTE — PROGRESS NOTES
After reviewing your labs, I believe they are within normal  limits for your age and let us stay with our current plan of action. If you have any questions, feel free to email thru [facility], or give us   a call back. Abundio Camarillo M.D.   Good Help to Those in Need  \"You maybe whatever you resolve to be\"

## 2018-10-10 DIAGNOSIS — I10 PRIMARY HYPERTENSION: ICD-10-CM

## 2018-10-11 RX ORDER — LISINOPRIL 20 MG/1
TABLET ORAL
Qty: 90 TAB | Refills: 1 | Status: SHIPPED | OUTPATIENT
Start: 2018-10-11 | End: 2019-07-20 | Stop reason: SDUPTHER

## 2018-10-22 ENCOUNTER — OFFICE VISIT (OUTPATIENT)
Dept: FAMILY MEDICINE CLINIC | Age: 83
End: 2018-10-22

## 2018-10-22 VITALS
SYSTOLIC BLOOD PRESSURE: 184 MMHG | BODY MASS INDEX: 27.66 KG/M2 | TEMPERATURE: 97.3 F | OXYGEN SATURATION: 96 % | HEART RATE: 81 BPM | DIASTOLIC BLOOD PRESSURE: 74 MMHG | WEIGHT: 162 LBS | RESPIRATION RATE: 16 BRPM | HEIGHT: 64 IN

## 2018-10-22 DIAGNOSIS — G47.00 INSOMNIA, UNSPECIFIED TYPE: ICD-10-CM

## 2018-10-22 RX ORDER — LORAZEPAM 1 MG/1
1 TABLET ORAL
Qty: 30 TAB | Refills: 5 | Status: SHIPPED | OUTPATIENT
Start: 2018-10-22 | End: 2018-11-19

## 2018-10-22 NOTE — PROGRESS NOTES
Chief Complaint   Patient presents with    Sleep Problem    Cholesterol Problem     Not taking Atorvastatin     1. Have you been to the ER, urgent care clinic since your last visit? Hospitalized since your last visit? No    2. Have you seen or consulted any other health care providers outside of the [address] [place] [address] since your last visit? Include any pap smears or colon screening. No      Chief Complaint   Patient presents with    Sleep Problem    Cholesterol Problem     Not taking Atorvastatin     He is a 80 y.o. male who presents for evalution. Reviewed PmHx, RxHx, FmHx, SocHx, AllgHx and updated and dated in the chart. Patient Active Problem List    Diagnosis    Episode of [facility] disorder ([facility]    Stress incontinence of urine    Diverticulitis of large intestine with perforation and abscess without bleeding    [facility]    Advance care planning     Has a Lw      Insomnia    Primary hypertension    Acquired hypothyroidism    Mixed hyperlipidemia    Coronary atherosclerosis of native coronary artery    Cancer [facility])     prostate         Review of Systems - negative except as listed above in the HPI    Objective:     Vitals:    10/22/18 1549   BP: 184/74   Pulse: 81   Resp: 16   Temp: 97.3 °F (36.3 °C)   TempSrc: Oral   SpO2: 96%   Weight: 162 lb (73.5 kg)   Height: 5' 4\" (1.626 m)     Physical Examination: General appearance - alert, well appearing, and in no distress  Neck - supple, no significant adenopathy  Chest - clear to auscultation, no wheezes, rales or rhonchi, symmetric air entry  Heart - normal rate, regular rhythm, normal S1, S2, no murmurs, rubs, clicks or gallops    Assessment/ Plan:   Diagnoses and all orders for this visit:    1. Insomnia, unspecified type  -     LORazepam (ATIVAN) 1 mg tablet; Take 1 Tab by mouth nightly.  Max Daily Amount: 1 mg.  -add rx and dc Trazadone     Follow-up Disposition:  Return if symptoms worsen or fail to improve. I have discussed the diagnosis with the patient and the intended plan as seen in the above orders. The patient understands and agrees with the plan. The patient has received an after-visit summary and questions were answered concerning future plans. Medication Side Effects and Warnings were discussed with patient  Patient Labs were reviewed and or requested:  Patient Past Records were reviewed and or requested    Cristy Henriquez M.D. There are no Patient Instructions on file for this visit.

## 2018-11-08 ENCOUNTER — OFFICE VISIT (OUTPATIENT)
Dept: FAMILY MEDICINE CLINIC | Age: 83
End: 2018-11-08

## 2018-11-08 VITALS
DIASTOLIC BLOOD PRESSURE: 68 MMHG | TEMPERATURE: 97.6 F | WEIGHT: 162 LBS | SYSTOLIC BLOOD PRESSURE: 159 MMHG | OXYGEN SATURATION: 97 % | HEIGHT: 64 IN | HEART RATE: 74 BPM | BODY MASS INDEX: 27.66 KG/M2 | RESPIRATION RATE: 16 BRPM

## 2018-11-08 DIAGNOSIS — J06.9 UPPER RESPIRATORY TRACT INFECTION, UNSPECIFIED TYPE: Primary | ICD-10-CM

## 2018-11-08 RX ORDER — CETIRIZINE HCL 10 MG
10 TABLET ORAL DAILY
Qty: 30 TAB | Refills: 5 | Status: SHIPPED | OUTPATIENT
Start: 2018-11-08 | End: 2018-11-19

## 2018-11-08 NOTE — PROGRESS NOTES
Chief Complaint   Patient presents with    Sinus Infection     X 1 week    Nasal Discharge     Back of throat    Labs     Not fasting- NOT taking Lipitor    Sleep Problem     Ativan wears off     1. Have you been to the ER, urgent care clinic since your last visit? Hospitalized since your last visit? No    2. Have you seen or consulted any other health care providers outside of the [address] [place] [address] since your last visit? Include any pap smears or colon screening. No       Chief Complaint   Patient presents with    Sinus Infection     X 1 week    Nasal Discharge     Back of throat    Labs     Not fasting- NOT taking Lipitor    Sleep Problem     Ativan wears off     He is a 80 y.o. male who presents for evalution. Reviewed PmHx, RxHx, FmHx, SocHx, AllgHx and updated and dated in the chart.     Patient Active Problem List    Diagnosis    Episode of recurrent major depressive disorder ([facility]    Stress incontinence of urine    Diverticulitis of large intestine with perforation and abscess without bleeding    Ischemic colitis ([facility]    Advance care planning     Has a Lw      Insomnia    Primary hypertension    Acquired hypothyroidism    Mixed hyperlipidemia    Coronary atherosclerosis of native coronary artery    Cancer [facility])     prostate         Review of Systems - negative except as listed above in the HPI    Objective:     Vitals:    11/08/18 1206   BP: 159/68   Pulse: 74   Resp: 16   Temp: 97.6 °F (36.4 °C)   TempSrc: Oral   SpO2: 97%   Weight: 162 lb (73.5 kg)   Height: 5' 4\" (1.626 m)     Physical Examination: General appearance - alert, well appearing, and in no distress  Nose - normal and patent, no erythema, discharge or polyps  Neck - supple, no significant adenopathy  Lymphatics - no palpable lymphadenopathy, no hepatosplenomegaly  Chest - clear to auscultation, no wheezes, rales or rhonchi, symmetric air entry  Heart - normal rate, regular rhythm, normal S1, S2, no murmurs, rubs, clicks or gallops    Assessment/ Plan:   Diagnoses and all orders for this visit:    1. Upper respiratory tract infection, unspecified type  -     cetirizine (ZYRTEC) 10 mg tablet; Take 1 Tab by mouth daily. Follow-up Disposition:  Return if symptoms worsen or fail to improve. I have discussed the diagnosis with the patient and the intended plan as seen in the above orders. The patient understands and agrees with the plan. The patient has received an after-visit summary and questions were answered concerning future plans. Medication Side Effects and Warnings were discussed with patient  Patient Labs were reviewed and or requested:  Patient Past Records were reviewed and or requested    Erwin Sauer M.D. There are no Patient Instructions on file for this visit.

## 2018-11-16 ENCOUNTER — TELEPHONE (OUTPATIENT)
Dept: FAMILY MEDICINE CLINIC | Age: 83
End: 2018-11-16

## 2018-11-16 NOTE — TELEPHONE ENCOUNTER
Patient walked in for an appt did let him know we were completely booked for today. He is wondering if you could please call in something for him. He is having a dry cough and running nose. Did let him know he might have to be seen first but I would send you a message and that you are off today. Call back number for him just in case is [phone]. Thanks.

## 2018-11-19 ENCOUNTER — OFFICE VISIT (OUTPATIENT)
Dept: FAMILY MEDICINE CLINIC | Age: 83
End: 2018-11-19

## 2018-11-19 VITALS
WEIGHT: 161 LBS | SYSTOLIC BLOOD PRESSURE: 131 MMHG | HEART RATE: 74 BPM | RESPIRATION RATE: 20 BRPM | TEMPERATURE: 98 F | OXYGEN SATURATION: 96 % | HEIGHT: 64 IN | DIASTOLIC BLOOD PRESSURE: 75 MMHG | BODY MASS INDEX: 27.49 KG/M2

## 2018-11-19 DIAGNOSIS — J06.9 UPPER RESPIRATORY TRACT INFECTION, UNSPECIFIED TYPE: Primary | ICD-10-CM

## 2018-11-19 DIAGNOSIS — G47.00 INSOMNIA, UNSPECIFIED TYPE: ICD-10-CM

## 2018-11-19 RX ORDER — CARBINOXAMINE MALEATE 4 MG/1
1 TABLET ORAL 2 TIMES DAILY
Qty: 60 TAB | Refills: 5 | Status: SHIPPED | OUTPATIENT
Start: 2018-11-19 | End: 2019-05-06

## 2018-11-19 RX ORDER — LORAZEPAM 2 MG/1
2 TABLET ORAL
Qty: 30 TAB | Refills: 5 | Status: SHIPPED | OUTPATIENT
Start: 2018-11-19 | End: 2019-05-06 | Stop reason: SDUPTHER

## 2018-11-19 NOTE — PROGRESS NOTES
Chief Complaint Patient presents with  Follow-up 10 day f/u , still scratchy throat, can't sleep, nose dripping.  Insomnia  
  on lorazepam, not staying asleep. He is a 80 y.o. male who presents for evalution. Reviewed PmHx, RxHx, FmHx, SocHx, AllgHx and updated and dated in the chart. Patient Active Problem List  
 Diagnosis  Episode of recurrent major depressive disorder ([facility]  Stress incontinence of urine  Diverticulitis of large intestine with perforation and abscess without bleeding  Ischemic colitis ([facility]  Advance care planning Has a Lw 
  
 Insomnia  Primary hypertension  Acquired hypothyroidism  Mixed hyperlipidemia  Coronary atherosclerosis of native coronary artery  Cancer [facility])  
  prostate Review of Systems - negative except as listed above in the HPI Objective:  
 
Vitals:  
 11/19/18 1454 BP: 131/75 Pulse: 74 Resp: 20 Temp: 98 °F (36.7 °C) SpO2: 96% Weight: 161 lb (73 kg) Height: 5' 4\" (1.626 m) Physical Examination: General appearance - alert, well appearing, and in no distress Nose - normal and patent, no erythema, discharge or polyps Neck - supple, no significant adenopathy Chest - clear to auscultation, no wheezes, rales or rhonchi, symmetric air entry Heart - normal rate, regular rhythm, normal S1, S2, no murmurs, rubs, clicks or gallops Assessment/ Plan:  
Diagnoses and all orders for this visit: 
 
1. Upper respiratory tract infection, unspecified type 
-     carbinoxamine maleate 4 mg tab; Take 1 Tab by mouth two (2) times a day. 
-add new rx 2. Insomnia, unspecified type -     LORazepam (ATIVAN) 2 mg tablet; Take 1 Tab by mouth nightly. Max Daily Amount: 2 mg. 
-inc dose Follow-up Disposition: 
Return if symptoms worsen or fail to improve. I have discussed the diagnosis with the patient and the intended plan as seen in the above orders.   The patient understands and agrees with the plan. The patient has received an after-visit summary and questions were answered concerning future plans. Medication Side Effects and Warnings were discussed with patient Patient Labs were reviewed and or requested: 
Patient Past Records were reviewed and or requested Laura Tucker M.D. There are no Patient Instructions on file for this visit.

## 2018-11-20 ENCOUNTER — TELEPHONE (OUTPATIENT)
Dept: FAMILY MEDICINE CLINIC | Age: 83
End: 2018-11-20

## 2018-11-20 NOTE — TELEPHONE ENCOUNTER
Deo called in and states patient came in for lorazepam rx but they didn't receive it yet. Told pharmacy that he should have a printed copy of rx and she agreed but states he only had a paper that says ask your dr where this medicine was called into.

## 2018-12-08 DIAGNOSIS — E03.9 ACQUIRED HYPOTHYROIDISM: ICD-10-CM

## 2018-12-09 RX ORDER — LEVOTHYROXINE SODIUM 112 UG/1
TABLET ORAL
Qty: 90 TAB | Refills: 0 | Status: SHIPPED | OUTPATIENT
Start: 2018-12-09 | End: 2019-03-27 | Stop reason: SDUPTHER

## 2019-01-16 ENCOUNTER — OFFICE VISIT (OUTPATIENT)
Dept: FAMILY MEDICINE CLINIC | Age: 84
End: 2019-01-16

## 2019-01-16 VITALS
SYSTOLIC BLOOD PRESSURE: 165 MMHG | OXYGEN SATURATION: 98 % | DIASTOLIC BLOOD PRESSURE: 67 MMHG | HEIGHT: 64 IN | WEIGHT: 163 LBS | TEMPERATURE: 97.5 F | BODY MASS INDEX: 27.83 KG/M2 | HEART RATE: 71 BPM | RESPIRATION RATE: 16 BRPM

## 2019-01-16 DIAGNOSIS — H61.23 BILATERAL IMPACTED CERUMEN: ICD-10-CM

## 2019-01-16 DIAGNOSIS — R21 RASH: Primary | ICD-10-CM

## 2019-01-16 DIAGNOSIS — I10 PRIMARY HYPERTENSION: ICD-10-CM

## 2019-01-16 RX ORDER — TRIAMCINOLONE ACETONIDE 1 MG/G
CREAM TOPICAL 2 TIMES DAILY
Qty: 45 G | Refills: 0 | Status: SHIPPED | OUTPATIENT
Start: 2019-01-16 | End: 2019-01-24

## 2019-01-16 NOTE — PROGRESS NOTES
Chief Complaint   Patient presents with    Wax in Ear     Left ear    Skin Problem     Back itchy    Labs     Not fasting : NOT taking Cholesterol med. 1. Have you been to the ER, urgent care clinic since your last visit? Hospitalized since your last visit? No    2. Have you seen or consulted any other health care providers outside of the [address] [place] [address] since your last visit? Include any pap smears or colon screening. No     Chief Complaint   Patient presents with    Wax in Ear     Left ear    Skin Problem     Back itchy    Labs     Not fasting : NOT taking Cholesterol med. He is a 80 y.o. male who presents for evalution. Reviewed PmHx, RxHx, FmHx, SocHx, AllgHx and updated and dated in the chart.     Patient Active Problem List    Diagnosis    Episode of recurrent major [facility] disorder ([facility]    Stress incontinence of urine    Diverticulitis of large intestine with perforation and abscess without bleeding    Ischemic colitis ([facility]    Advance care planning     Has a Lw      Insomnia    Primary hypertension    Acquired hypothyroidism    Mixed hyperlipidemia    Coronary atherosclerosis of native coronary artery    Cancer ([facility]     prostate         Review of Systems - negative except as listed above in the HPI    Objective:     Vitals:    01/16/19 1554   BP: 165/67   Pulse: 71   Resp: 16   Temp: 97.5 °F (36.4 °C)   TempSrc: Oral   SpO2: 98%   Weight: 163 lb (73.9 kg)   Height: 5' 4\" (1.626 m)     Physical Examination: General appearance - alert, well appearing, and in no distress  Nose - normal and patent, no erythema, discharge or polyps  Mouth - mucous membranes moist, pharynx normal without lesions  Neck - supple, no significant adenopathy  Chest - clear to auscultation, no wheezes, rales or rhonchi, symmetric air entry  Heart - normal rate, regular rhythm, normal S1, S2, no murmurs, rubs, clicks or gallops      Assessment/ Plan:   Diagnoses and all orders for this visit: 1. Rash  -     triamcinolone acetonide (KENALOG) 0.1 % topical cream; Apply  to affected area two (2) times a day. 2. Bilateral impacted cerumen  -irrigated two    3. Primary hypertension  -ok for age       Follow-up Disposition:  Return if symptoms worsen or fail to improve. I have discussed the diagnosis with the patient and the intended plan as seen in the above orders. The patient understands and agrees with the plan. The patient has received an after-visit summary and questions were answered concerning future plans. Medication Side Effects and Warnings were discussed with patient  Patient Labs were reviewed and or requested:  Patient Past Records were reviewed and or requested    Liss Talamantes M.D. There are no Patient Instructions on file for this visit.

## 2019-01-22 ENCOUNTER — TELEPHONE (OUTPATIENT)
Dept: FAMILY MEDICINE CLINIC | Age: 84
End: 2019-01-22

## 2019-01-22 NOTE — TELEPHONE ENCOUNTER
----- Message from Di Form sent at 1/21/2019  5:37 PM EST -----  Regarding: Dr. Abdoul Palacios  Pt is requesting a Rx refill for doxycycline going to the pharmacy on file.   Best contact number for the Pt is [phone]

## 2019-01-22 NOTE — TELEPHONE ENCOUNTER
Spoke with pt stated he has been taking doxy for yrs when abd sx are present, pt denies n/v,or abd pain. States only sx noted is bloating, nurse asked if pt took anything otc for sx pt states no. Pt also scheduled appt for Thursday for fasting labs.

## 2019-01-24 ENCOUNTER — HOSPITAL ENCOUNTER (OUTPATIENT)
Dept: LAB | Age: 84
Discharge: HOME | End: 2019-01-24
Payer: MEDICARE

## 2019-01-24 ENCOUNTER — OFFICE VISIT (OUTPATIENT)
Dept: FAMILY MEDICINE CLINIC | Age: 84
End: 2019-01-24

## 2019-01-24 VITALS
HEART RATE: 74 BPM | SYSTOLIC BLOOD PRESSURE: 138 MMHG | HEIGHT: 64 IN | DIASTOLIC BLOOD PRESSURE: 76 MMHG | BODY MASS INDEX: 27.49 KG/M2 | RESPIRATION RATE: 18 BRPM | TEMPERATURE: 99 F | OXYGEN SATURATION: 94 % | WEIGHT: 161 LBS

## 2019-01-24 DIAGNOSIS — E03.9 ACQUIRED HYPOTHYROIDISM: ICD-10-CM

## 2019-01-24 DIAGNOSIS — R79.89 CREATININE ELEVATION: ICD-10-CM

## 2019-01-24 DIAGNOSIS — E78.2 MIXED HYPERLIPIDEMIA: ICD-10-CM

## 2019-01-24 DIAGNOSIS — R73.9 ELEVATED BLOOD SUGAR: ICD-10-CM

## 2019-01-24 DIAGNOSIS — F33.9 EPISODE OF RECURRENT MAJOR DEPRESSIVE DISORDER, UNSPECIFIED DEPRESSION EPISODE SEVERITY (HCC): ICD-10-CM

## 2019-01-24 DIAGNOSIS — Z00.00 ROUTINE GENERAL MEDICAL EXAMINATION AT A HEALTH CARE FACILITY: Primary | ICD-10-CM

## 2019-01-24 DIAGNOSIS — R21 RASH: ICD-10-CM

## 2019-01-24 DIAGNOSIS — C61 PROSTATE CA (HCC): ICD-10-CM

## 2019-01-24 DIAGNOSIS — C80.1 CANCER (HCC): ICD-10-CM

## 2019-01-24 PROCEDURE — 84153 ASSAY OF PSA TOTAL: CPT

## 2019-01-24 PROCEDURE — 80053 COMPREHEN METABOLIC PANEL: CPT

## 2019-01-24 PROCEDURE — 84443 ASSAY THYROID STIM HORMONE: CPT

## 2019-01-24 PROCEDURE — 83036 HEMOGLOBIN GLYCOSYLATED A1C: CPT

## 2019-01-24 PROCEDURE — 85025 COMPLETE CBC W/AUTO DIFF WBC: CPT

## 2019-01-24 PROCEDURE — 80061 LIPID PANEL: CPT

## 2019-01-24 RX ORDER — TRIAMCINOLONE ACETONIDE 1 MG/G
CREAM TOPICAL 2 TIMES DAILY
Qty: 45 G | Refills: 0 | Status: ON HOLD | OUTPATIENT
Start: 2019-01-24 | End: 2019-08-12

## 2019-01-24 RX ORDER — DICYCLOMINE HYDROCHLORIDE 10 MG/1
10 CAPSULE ORAL 3 TIMES DAILY
Qty: 60 CAP | Refills: 2 | Status: SHIPPED | OUTPATIENT
Start: 2019-01-24 | End: 2019-05-06

## 2019-01-24 NOTE — PROGRESS NOTES
Patient ehre for 3 month fasting labs. Patient having stomach issues again. He had this in the past and used to take dicyclomine ( see previous med list) 1. Have you been to the ER, urgent care clinic since your last visit? Hospitalized since your last visit? No 
 
2. Have you seen or consulted any other health care providers outside of the [address] [place] [address] since your last visit? Include any pap smears or colon screening. No  
 
 
 
Medicare Wellness Exam: Chief Complaint Patient presents with  Labs  
  fasting  Abdominal Pain  
  abdominal pain after eating sometimes, more frequent lately. used to take dicyclomine from Gi   
 
he is a 80y.o. year old male who presents for evaluation for their Medicare Wellness Visit. Fall Screen is completed and assessed=yes Depression Screen is completed and assessed=yes Medication list reviewed and adjusted for accuracy=yes Immunizations reviewed and updated=yes Health/Preventative Screenings reviewed and updated=yes ADL Functions reviewed=yes Patient Active Problem List  
 Diagnosis  Episode of recurrent major depressive disorder ([facility] [address]  Stress incontinence of urine  Diverticulitis of large intestine with perforation and abscess without bleeding  Ischemic colitis ([facility] [address]  Advance care planning Has a Lw 
  
 Insomnia  Primary hypertension  Acquired hypothyroidism  Mixed hyperlipidemia  Coronary atherosclerosis of native coronary artery  Cancer [facility])  
  prostate Reviewed PmHx, RxHx, FmHx, SocHx, AllgHx and updated and dated in the chart. Review of Systems - negative except as listed above in the HPI Objective:  
 
Vitals:  
 01/24/19 1123 BP: 138/76 Pulse: 74 Resp: 18 Temp: 99 °F (37.2 °C) SpO2: 94% Weight: 161 lb (73 kg) Height: 5' 4\" (1.626 m) Physical Examination: General appearance - alert, well appearing, and in no distress Neck - supple, no significant adenopathy Chest - clear to auscultation, no wheezes, rales or rhonchi, symmetric air entry Heart - normal rate, regular rhythm, normal S1, S2, no murmurs, rubs, clicks or gallops Abdomen - soft, nontender, nondistended, no masses or organomegaly Assessment/ Plan:  
Diagnoses and all orders for this visit: 1. Routine general medical examination at a health care facility 
-see below 2. Rash 
-     triamcinolone acetonide (KENALOG) 0.1 % topical cream; Apply  to affected area two (2) times a day. 3. Creatinine elevation -     METABOLIC PANEL, COMPREHENSIVE 4. Elevated blood sugar -     METABOLIC PANEL, COMPREHENSIVE 
-     CBC WITH AUTOMATED DIFF 
-     HEMOGLOBIN A1C WITH EAG 
 
5. Acquired hypothyroidism 
-     TSH 3RD GENERATION 6. Mixed hyperlipidemia -     LIPID PANEL 
-     METABOLIC PANEL, COMPREHENSIVE 7. Episode of recurrent major depressive disorder, unspecified depression episode severity ([facility] [address] -stable 8. Cancer ([facility] [address] -prostate stable but overdue for labs 9. Prostate CA ([facility] [address] -     PSA, DIAGNOSTIC (PROSTATE SPECIFIC AG) Other orders 
-     dicyclomine (BENTYL) 10 mg capsule; Take 1 Cap by mouth three (3) times daily. 
 
  
 
-Pain evaluation performed in office 
-Cognitive Screen performed in office 
-Depression Screen, Fall risks (by up and go test)  and ADL functionality were addressed 
-Medication list updated and reviewed for any changes  
-A comprehensive review of medical issues and a plan was formulated 
-End of life planning was addressed with pt  
-Health Screenings for preventions were addressed and a plan was formulated 
-Shingles Vaccine was recommended 
-Discussed with patient cancer risk factors and appropriate screenings for age 
-Patient evaluated for colonoscopy and referred if needed per screeing criteria 
-Labs from previous visits were discussed with patient  
-Discussed with patient diet and exercise and formulated a plan as needed -An Advanced care plan was developed with the patient. 
-Alcohol screening performed and was negative 
 
-Follow-up Disposition: 
Return if symptoms worsen or fail to improve. I have discussed the diagnosis with the patient and the intended plan as seen in the above orders. The patient understands and agrees with the plan. The patient has received an after-visit summary and questions were answered concerning future plans. Medication Side Effects and Warnings were discussed with patien Patient Labs were reviewed and or requested Patient Past Records were reviewed and or requested There are no Patient Instructions on file for this visit.  
 
 
Wilfrid Cheema M.D.

## 2019-01-25 LAB
ALBUMIN SERPL-MCNC: 4.5 G/DL (ref 3.5–4.7)
ALBUMIN/GLOB SERPL: 1.7 {RATIO} (ref 1.2–2.2)
ALP SERPL-CCNC: 85 IU/L (ref 39–117)
ALT SERPL-CCNC: 10 IU/L (ref 0–44)
AST SERPL-CCNC: 17 IU/L (ref 0–40)
BASOPHILS # BLD AUTO: 0 X10E3/UL (ref 0–0.2)
BASOPHILS NFR BLD AUTO: 0 %
BILIRUB SERPL-MCNC: 0.3 MG/DL (ref 0–1.2)
BUN SERPL-MCNC: 25 MG/DL (ref 8–27)
BUN/CREAT SERPL: 23 (ref 10–24)
CALCIUM SERPL-MCNC: 9.8 MG/DL (ref 8.6–10.2)
CHLORIDE SERPL-SCNC: 100 MMOL/L (ref 96–106)
CHOLEST SERPL-MCNC: 215 MG/DL (ref 100–199)
CO2 SERPL-SCNC: 21 MMOL/L (ref 20–29)
CREAT SERPL-MCNC: 1.1 MG/DL (ref 0.76–1.27)
EOSINOPHIL # BLD AUTO: 0.1 X10E3/UL (ref 0–0.4)
EOSINOPHIL NFR BLD AUTO: 1 %
ERYTHROCYTE [DISTWIDTH] IN BLOOD BY AUTOMATED COUNT: 14.3 % (ref 12.3–15.4)
EST. AVERAGE GLUCOSE BLD GHB EST-MCNC: 114 MG/DL
GLOBULIN SER CALC-MCNC: 2.7 G/DL (ref 1.5–4.5)
GLUCOSE SERPL-MCNC: 90 MG/DL (ref 65–99)
HBA1C MFR BLD: 5.6 % (ref 4.8–5.6)
HCT VFR BLD AUTO: 41.2 % (ref 37.5–51)
HDLC SERPL-MCNC: 32 MG/DL
HGB BLD-MCNC: 14.2 G/DL (ref 13–17.7)
IMM GRANULOCYTES # BLD AUTO: 0 X10E3/UL (ref 0–0.1)
IMM GRANULOCYTES NFR BLD AUTO: 0 %
INTERPRETATION, 910389: NORMAL
LDLC SERPL CALC-MCNC: 120 MG/DL (ref 0–99)
LYMPHOCYTES # BLD AUTO: 1.5 X10E3/UL (ref 0.7–3.1)
LYMPHOCYTES NFR BLD AUTO: 24 %
MCH RBC QN AUTO: 29.1 PG (ref 26.6–33)
MCHC RBC AUTO-ENTMCNC: 34.5 G/DL (ref 31.5–35.7)
MCV RBC AUTO: 84 FL (ref 79–97)
MONOCYTES # BLD AUTO: 0.6 X10E3/UL (ref 0.1–0.9)
MONOCYTES NFR BLD AUTO: 9 %
NEUTROPHILS # BLD AUTO: 4.2 X10E3/UL (ref 1.4–7)
NEUTROPHILS NFR BLD AUTO: 66 %
PLATELET # BLD AUTO: 240 X10E3/UL (ref 150–379)
POTASSIUM SERPL-SCNC: 5.3 MMOL/L (ref 3.5–5.2)
PROT SERPL-MCNC: 7.2 G/DL (ref 6–8.5)
PSA SERPL-MCNC: <0.1 NG/ML (ref 0–4)
RBC # BLD AUTO: 4.88 X10E6/UL (ref 4.14–5.8)
SODIUM SERPL-SCNC: 140 MMOL/L (ref 134–144)
TRIGL SERPL-MCNC: 314 MG/DL (ref 0–149)
TSH SERPL DL<=0.005 MIU/L-ACNC: 1.89 UIU/ML (ref 0.45–4.5)
VLDLC SERPL CALC-MCNC: 63 MG/DL (ref 5–40)
WBC # BLD AUTO: 6.5 X10E3/UL (ref 3.4–10.8)

## 2019-01-28 NOTE — PROGRESS NOTES
After reviewing your labs, I believe they are within normal 
limits for your age and let us stay with our current plan of action. If you have any questions, feel free to email thru \[facility]\"" SERVICES, or give us  
a call back. Autumn Wiley M.D. Good Help to Those in Need \"You maybe whatever you resolve to be\"

## 2019-03-27 DIAGNOSIS — E03.9 ACQUIRED HYPOTHYROIDISM: ICD-10-CM

## 2019-03-27 RX ORDER — LEVOTHYROXINE SODIUM 112 UG/1
TABLET ORAL
Qty: 90 TAB | Refills: 0 | Status: SHIPPED | OUTPATIENT
Start: 2019-03-27 | End: 2019-06-28 | Stop reason: SDUPTHER

## 2019-04-10 ENCOUNTER — OFFICE VISIT (OUTPATIENT)
Dept: FAMILY MEDICINE CLINIC | Age: 84
End: 2019-04-10

## 2019-04-10 VITALS
HEART RATE: 77 BPM | TEMPERATURE: 98.4 F | DIASTOLIC BLOOD PRESSURE: 77 MMHG | RESPIRATION RATE: 22 BRPM | OXYGEN SATURATION: 97 % | SYSTOLIC BLOOD PRESSURE: 149 MMHG | WEIGHT: 161 LBS | BODY MASS INDEX: 27.49 KG/M2 | HEIGHT: 64 IN

## 2019-04-10 DIAGNOSIS — H61.23 BILATERAL IMPACTED CERUMEN: Primary | ICD-10-CM

## 2019-04-10 DIAGNOSIS — I10 PRIMARY HYPERTENSION: ICD-10-CM

## 2019-04-10 NOTE — PROGRESS NOTES
Patient here for wax in ears. 1. Have you been to the ER, urgent care clinic since your last visit? Hospitalized since your last visit? No 
 
2. Have you seen or consulted any other health care providers outside of the [address] [place] [address] since your last visit? Include any pap smears or colon screening. No  
 
 
 
Chief Complaint Patient presents with  Ear Fullness  
  wax build up He is a 80 y.o. male who presents for evalution. Reviewed PmHx, RxHx, FmHx, SocHx, AllgHx and updated and dated in the chart. Patient Active Problem List  
 Diagnosis  Episode of recurrent major depressive disorder ([facility] [address]  Stress incontinence of urine  Diverticulitis of large intestine with perforation and abscess without bleeding  Ischemic colitis ([facility] [address]  Advance care planning Has a Lw 
  
 Insomnia  Primary hypertension  Acquired hypothyroidism  Mixed hyperlipidemia  Coronary atherosclerosis of native coronary artery  Cancer [facility])  
  prostate Review of Systems - negative except as listed above in the HPI Objective:  
 
Vitals:  
 04/10/19 1326 BP: 149/77 Pulse: 77 Resp: 22 Temp: 98.4 °F (36.9 °C) SpO2: 97% Weight: 161 lb (73 kg) Height: 5' 4\" (1.626 m) Physical Examination: General appearance - alert, well appearing, and in no distress Ears - bilateral TM's and external ear canals normal, ceruminosis noted, cerumen removed Assessment/ Plan:  
Diagnoses and all orders for this visit: 
 
1. Bilateral impacted cerumen 
-as above 2. Primary hypertension -at goal for age Follow-up and Dispositions · Return if symptoms worsen or fail to improve. I have discussed the diagnosis with the patient and the intended plan as seen in the above orders. The patient understands and agrees with the plan. The patient has received an after-visit summary and questions were answered concerning future plans. Medication Side Effects and Warnings were discussed with patient Patient Labs were reviewed and or requested: 
Patient Past Records were reviewed and or requested Saulo Mckeon M.D. There are no Patient Instructions on file for this visit.

## 2019-05-06 ENCOUNTER — OFFICE VISIT (OUTPATIENT)
Dept: FAMILY MEDICINE CLINIC | Age: 84
End: 2019-05-06

## 2019-05-06 VITALS
TEMPERATURE: 97.4 F | RESPIRATION RATE: 16 BRPM | HEART RATE: 82 BPM | SYSTOLIC BLOOD PRESSURE: 152 MMHG | OXYGEN SATURATION: 94 % | BODY MASS INDEX: 27.42 KG/M2 | HEIGHT: 64 IN | DIASTOLIC BLOOD PRESSURE: 67 MMHG | WEIGHT: 160.6 LBS

## 2019-05-06 DIAGNOSIS — G47.00 INSOMNIA, UNSPECIFIED TYPE: ICD-10-CM

## 2019-05-06 DIAGNOSIS — Z88.9 MULTIPLE ALLERGIES: Primary | ICD-10-CM

## 2019-05-06 RX ORDER — LORAZEPAM 2 MG/1
2 TABLET ORAL
Qty: 30 TAB | Refills: 5 | Status: ON HOLD | OUTPATIENT
Start: 2019-05-06 | End: 2019-08-12

## 2019-05-06 RX ORDER — LEVOCETIRIZINE DIHYDROCHLORIDE 5 MG/1
5 TABLET, FILM COATED ORAL DAILY
Qty: 30 TAB | Refills: 5 | Status: ON HOLD | OUTPATIENT
Start: 2019-05-06 | End: 2019-08-12

## 2019-05-06 NOTE — PROGRESS NOTES
Chief Complaint   Patient presents with    Nasal Discharge     Left ear clogged    Cough     X 5 days    Watery Eyes    Medication Refill     1. Have you been to the ER, urgent care clinic since your last visit? Hospitalized since your last visit? No    2. Have you seen or consulted any other health care providers outside of the [address] [place] [address] since your last visit? Include any pap smears or colon screening. No      Chief Complaint   Patient presents with    Nasal Discharge     Left ear clogged    Cough     X 5 days    Watery Eyes    Medication Refill     He is a 80 y.o. male who presents for evalution. Reviewed PmHx, RxHx, FmHx, SocHx, AllgHx and updated and dated in the chart. Patient Active Problem List    Diagnosis    Episode of recurrent major depressive disorder ([facility] [address]    Stress incontinence of urine    Diverticulitis of large intestine with perforation and abscess without bleeding    Ischemic colitis ([facility] [address]    Advance care planning     Has a Lw      Insomnia    Primary hypertension    Acquired hypothyroidism    Mixed hyperlipidemia    Coronary atherosclerosis of native coronary artery    Cancer ([facility] [address]     prostate         Review of Systems - negative except as listed above in the HPI    Objective:     Vitals:    05/06/19 1045   BP: 152/67   Pulse: 82   Resp: 16   Temp: 97.4 °F (36.3 °C)   TempSrc: Oral   SpO2: 94%   Weight: 160 lb 9.6 oz (72.8 kg)   Height: 5' 4\" (1.626 m)     Physical Examination: General appearance - alert, well appearing, and in no distress  Nose - clear rhinorrhea  Chest - clear to auscultation, no wheezes, rales or rhonchi, symmetric air entry  Heart - normal rate, regular rhythm, normal S1, S2, no murmurs, rubs, clicks or gallops        Assessment/ Plan:   Diagnoses and all orders for this visit:    1. Multiple allergies  -     levocetirizine (XYZAL) 5 mg tablet; Take 1 Tab by mouth daily.     2. Insomnia, unspecified type  -     LORazepam (ATIVAN) 2 mg tablet; Take 1 Tab by mouth nightly. Max Daily Amount: 2 mg. Indications: chronic trouble sleeping           I have discussed the diagnosis with the patient and the intended plan as seen in the above orders. The patient understands and agrees with the plan. The patient has received an after-visit summary and questions were answered concerning future plans. Medication Side Effects and Warnings were discussed with patient  Patient Labs were reviewed and or requested:  Patient Past Records were reviewed and or requested    Johanna Mcghee M.D. There are no Patient Instructions on file for this visit.

## 2019-06-28 DIAGNOSIS — E03.9 ACQUIRED HYPOTHYROIDISM: ICD-10-CM

## 2019-07-01 RX ORDER — LEVOTHYROXINE SODIUM 112 UG/1
TABLET ORAL
Qty: 90 TAB | Refills: 0 | Status: SHIPPED | OUTPATIENT
Start: 2019-07-01 | End: 2019-10-03 | Stop reason: SDUPTHER

## 2019-07-20 DIAGNOSIS — I10 PRIMARY HYPERTENSION: ICD-10-CM

## 2019-07-23 RX ORDER — LISINOPRIL 20 MG/1
TABLET ORAL
Qty: 90 TAB | Refills: 0 | Status: SHIPPED | OUTPATIENT
Start: 2019-07-23 | End: 2019-07-25

## 2019-07-25 ENCOUNTER — OFFICE VISIT (OUTPATIENT)
Dept: FAMILY MEDICINE CLINIC | Age: 84
End: 2019-07-25

## 2019-07-25 ENCOUNTER — HOSPITAL ENCOUNTER (OUTPATIENT)
Dept: LAB | Age: 84
Discharge: HOME | End: 2019-07-25
Payer: MEDICARE

## 2019-07-25 VITALS
HEART RATE: 69 BPM | TEMPERATURE: 97.4 F | HEIGHT: 64 IN | OXYGEN SATURATION: 99 % | RESPIRATION RATE: 16 BRPM | SYSTOLIC BLOOD PRESSURE: 195 MMHG | WEIGHT: 159.9 LBS | BODY MASS INDEX: 27.3 KG/M2 | DIASTOLIC BLOOD PRESSURE: 51 MMHG

## 2019-07-25 DIAGNOSIS — R79.89 CREATININE ELEVATION: ICD-10-CM

## 2019-07-25 DIAGNOSIS — E03.9 ACQUIRED HYPOTHYROIDISM: Primary | ICD-10-CM

## 2019-07-25 DIAGNOSIS — I10 ESSENTIAL HYPERTENSION: ICD-10-CM

## 2019-07-25 DIAGNOSIS — R73.9 ELEVATED BLOOD SUGAR: ICD-10-CM

## 2019-07-25 PROCEDURE — 80053 COMPREHEN METABOLIC PANEL: CPT

## 2019-07-25 PROCEDURE — 84443 ASSAY THYROID STIM HORMONE: CPT

## 2019-07-25 PROCEDURE — 83036 HEMOGLOBIN GLYCOSYLATED A1C: CPT

## 2019-07-25 RX ORDER — LISINOPRIL AND HYDROCHLOROTHIAZIDE 20; 25 MG/1; MG/1
1 TABLET ORAL DAILY
Qty: 90 TAB | Refills: 1 | Status: SHIPPED | OUTPATIENT
Start: 2019-07-25 | End: 2019-08-11 | Stop reason: CLARIF

## 2019-07-25 NOTE — PROGRESS NOTES
Chief Complaint   Patient presents with    Hypertension    Labs     Fasting today : NOT TAKING LIPITOR     1. Have you been to the ER, urgent care clinic since your last visit? Hospitalized since your last visit? No    2. Have you seen or consulted any other health care providers outside of the [address] [place] [address] since your last visit? Include any pap smears or colon screening. No         Chief Complaint   Patient presents with    Hypertension    Labs     Fasting today : NOT TAKING LIPITOR     He is a 80 y.o. male who presents for evalution. Reviewed PmHx, RxHx, FmHx, SocHx, AllgHx and updated and dated in the chart. Patient Active Problem List    Diagnosis    Episode of recurrent major depressive disorder ([facility] [address]    Stress incontinence of urine    Diverticulitis of large intestine with perforation and abscess without bleeding    [facility]    Advance care planning     Has a Lw      Insomnia    Primary hypertension    Acquired hypothyroidism    Mixed hyperlipidemia    Coronary atherosclerosis of native coronary artery    Cancer [facility])     prostate         Review of Systems - negative except as listed above in the HPI    Objective:     Vitals:    07/25/19 0718   BP: 195/51   Pulse: 69   Resp: 16   Temp: 97.4 °F (36.3 °C)   TempSrc: Oral   SpO2: 99%   Weight: 159 lb 14.4 oz (72.5 kg)   Height: 5' 4\" (1.626 m)     Physical Examination: General appearance - alert, well appearing, and in no distress  Neck - supple, no significant adenopathy  Chest - clear to auscultation, no wheezes, rales or rhonchi, symmetric air entry  Heart - normal rate, regular rhythm, normal S1, S2, no murmurs, rubs, clicks or gallops  Abdomen - soft, nontender, nondistended, no masses or organomegaly  Extremities - peripheral pulses normal, no pedal edema, no clubbing or cyanosis      Assessment/ Plan:   Diagnoses and all orders for this visit:    1. Acquired hypothyroidism  -     TSH 3RD GENERATION    2. Elevated blood sugar  -     METABOLIC PANEL, COMPREHENSIVE  -     HEMOGLOBIN A1C WITH EAG    3. Creatinine elevation  -     METABOLIC PANEL, COMPREHENSIVE    4. Essential hypertension  -     METABOLIC PANEL, COMPREHENSIVE  -     lisinopril-hydroCHLOROthiazide (PRINZIDE, ZESTORETIC) 20-25 mg per tablet; Take 1 Tab by mouth daily. -inc bp and no sxs  -inc rx  -check bp at home       Follow-up and Dispositions    · Return in about 6 months (around 1/25/2020). I have discussed the diagnosis with the patient and the intended plan as seen in the above orders. The patient understands and agrees with the plan. The patient has received an after-visit summary and questions were answered concerning future plans. Medication Side Effects and Warnings were discussed with patient  Patient Labs were reviewed and or requested:  Patient Past Records were reviewed and or requested    Delmy May M.D. There are no Patient Instructions on file for this visit.

## 2019-07-26 LAB
ALBUMIN SERPL-MCNC: 4.1 G/DL (ref 3.5–4.7)
ALBUMIN/GLOB SERPL: 1.6 {RATIO} (ref 1.2–2.2)
ALP SERPL-CCNC: 82 IU/L (ref 39–117)
ALT SERPL-CCNC: 7 IU/L (ref 0–44)
AST SERPL-CCNC: 13 IU/L (ref 0–40)
BILIRUB SERPL-MCNC: 0.3 MG/DL (ref 0–1.2)
BUN SERPL-MCNC: 15 MG/DL (ref 8–27)
BUN/CREAT SERPL: 17 (ref 10–24)
CALCIUM SERPL-MCNC: 8.9 MG/DL (ref 8.6–10.2)
CHLORIDE SERPL-SCNC: 104 MMOL/L (ref 96–106)
CO2 SERPL-SCNC: 24 MMOL/L (ref 20–29)
CREAT SERPL-MCNC: 0.86 MG/DL (ref 0.76–1.27)
EST. AVERAGE GLUCOSE BLD GHB EST-MCNC: 114 MG/DL
GLOBULIN SER CALC-MCNC: 2.6 G/DL (ref 1.5–4.5)
GLUCOSE SERPL-MCNC: 91 MG/DL (ref 65–99)
HBA1C MFR BLD: 5.6 % (ref 4.8–5.6)
POTASSIUM SERPL-SCNC: 4.5 MMOL/L (ref 3.5–5.2)
PROT SERPL-MCNC: 6.7 G/DL (ref 6–8.5)
SODIUM SERPL-SCNC: 140 MMOL/L (ref 134–144)
TSH SERPL DL<=0.005 MIU/L-ACNC: 2.16 UIU/ML (ref 0.45–4.5)

## 2019-07-29 NOTE — PROGRESS NOTES
Your labs look good! Let's keep with the plan as we discussed during our visit.        Omayra Hernandes M.D.  ]

## 2019-08-11 ENCOUNTER — APPOINTMENT (OUTPATIENT)
Dept: CT IMAGING | Age: 84
DRG: 641 | End: 2019-08-11
Attending: STUDENT IN AN ORGANIZED HEALTH CARE EDUCATION/TRAINING PROGRAM
Payer: MEDICARE

## 2019-08-11 ENCOUNTER — HOSPITAL ENCOUNTER (INPATIENT)
Age: 84
LOS: 2 days | Discharge: HOME HEALTH | DRG: 641 | End: 2019-08-13
Attending: EMERGENCY MEDICINE | Admitting: FAMILY MEDICINE
Payer: MEDICARE

## 2019-08-11 DIAGNOSIS — E87.1 HYPONATREMIA: Primary | ICD-10-CM

## 2019-08-11 DIAGNOSIS — Z88.9 MULTIPLE ALLERGIES: ICD-10-CM

## 2019-08-11 DIAGNOSIS — R19.7 DIARRHEA, UNSPECIFIED TYPE: ICD-10-CM

## 2019-08-11 LAB
ALBUMIN SERPL-MCNC: 3.5 G/DL (ref 3.5–5)
ALBUMIN/GLOB SERPL: 1.1 {RATIO} (ref 1.1–2.2)
ALP SERPL-CCNC: 82 U/L (ref 45–117)
ALT SERPL-CCNC: 15 U/L (ref 12–78)
ANION GAP SERPL CALC-SCNC: 10 MMOL/L (ref 5–15)
ANION GAP SERPL CALC-SCNC: 9 MMOL/L (ref 5–15)
AST SERPL-CCNC: 17 U/L (ref 15–37)
BASOPHILS # BLD: 0 K/UL (ref 0–0.1)
BASOPHILS NFR BLD: 0 % (ref 0–1)
BILIRUB SERPL-MCNC: 0.5 MG/DL (ref 0.2–1)
BUN SERPL-MCNC: 17 MG/DL (ref 6–20)
BUN SERPL-MCNC: 19 MG/DL (ref 6–20)
BUN/CREAT SERPL: 16 (ref 12–20)
BUN/CREAT SERPL: 17 (ref 12–20)
CALCIUM SERPL-MCNC: 7.9 MG/DL (ref 8.5–10.1)
CALCIUM SERPL-MCNC: 8.1 MG/DL (ref 8.5–10.1)
CHLORIDE SERPL-SCNC: 88 MMOL/L (ref 97–108)
CHLORIDE SERPL-SCNC: 90 MMOL/L (ref 97–108)
CO2 SERPL-SCNC: 21 MMOL/L (ref 21–32)
CO2 SERPL-SCNC: 22 MMOL/L (ref 21–32)
COMMENT, HOLDF: NORMAL
CREAT SERPL-MCNC: 1.07 MG/DL (ref 0.7–1.3)
CREAT SERPL-MCNC: 1.12 MG/DL (ref 0.7–1.3)
DIFFERENTIAL METHOD BLD: ABNORMAL
EOSINOPHIL # BLD: 0 K/UL (ref 0–0.4)
EOSINOPHIL NFR BLD: 0 % (ref 0–7)
ERYTHROCYTE [DISTWIDTH] IN BLOOD BY AUTOMATED COUNT: 13 % (ref 11.5–14.5)
GLOBULIN SER CALC-MCNC: 3.3 G/DL (ref 2–4)
GLUCOSE SERPL-MCNC: 87 MG/DL (ref 65–100)
GLUCOSE SERPL-MCNC: 98 MG/DL (ref 65–100)
HCT VFR BLD AUTO: 38.4 % (ref 36.6–50.3)
HGB BLD-MCNC: 13.7 G/DL (ref 12.1–17)
IMM GRANULOCYTES # BLD AUTO: 0 K/UL (ref 0–0.04)
IMM GRANULOCYTES NFR BLD AUTO: 0 % (ref 0–0.5)
LIPASE SERPL-CCNC: 265 U/L (ref 73–393)
LYMPHOCYTES # BLD: 1.4 K/UL (ref 0.8–3.5)
LYMPHOCYTES NFR BLD: 14 % (ref 12–49)
MCH RBC QN AUTO: 28.8 PG (ref 26–34)
MCHC RBC AUTO-ENTMCNC: 35.7 G/DL (ref 30–36.5)
MCV RBC AUTO: 80.7 FL (ref 80–99)
MONOCYTES # BLD: 0.8 K/UL (ref 0–1)
MONOCYTES NFR BLD: 8 % (ref 5–13)
NEUTS SEG # BLD: 7.8 K/UL (ref 1.8–8)
NEUTS SEG NFR BLD: 78 % (ref 32–75)
NRBC # BLD: 0 K/UL (ref 0–0.01)
NRBC BLD-RTO: 0 PER 100 WBC
PLATELET # BLD AUTO: 287 K/UL (ref 150–400)
PMV BLD AUTO: 8.7 FL (ref 8.9–12.9)
POTASSIUM SERPL-SCNC: 4.2 MMOL/L (ref 3.5–5.1)
POTASSIUM SERPL-SCNC: 4.3 MMOL/L (ref 3.5–5.1)
PROT SERPL-MCNC: 6.8 G/DL (ref 6.4–8.2)
RBC # BLD AUTO: 4.76 M/UL (ref 4.1–5.7)
SAMPLES BEING HELD,HOLD: NORMAL
SODIUM SERPL-SCNC: 119 MMOL/L (ref 136–145)
SODIUM SERPL-SCNC: 121 MMOL/L (ref 136–145)
WBC # BLD AUTO: 10.1 K/UL (ref 4.1–11.1)

## 2019-08-11 PROCEDURE — 74177 CT ABD & PELVIS W/CONTRAST: CPT

## 2019-08-11 PROCEDURE — 85025 COMPLETE CBC W/AUTO DIFF WBC: CPT

## 2019-08-11 PROCEDURE — 74011250637 HC RX REV CODE- 250/637: Performed by: STUDENT IN AN ORGANIZED HEALTH CARE EDUCATION/TRAINING PROGRAM

## 2019-08-11 PROCEDURE — 83930 ASSAY OF BLOOD OSMOLALITY: CPT

## 2019-08-11 PROCEDURE — 74011636320 HC RX REV CODE- 636/320: Performed by: RADIOLOGY

## 2019-08-11 PROCEDURE — 96361 HYDRATE IV INFUSION ADD-ON: CPT

## 2019-08-11 PROCEDURE — 84300 ASSAY OF URINE SODIUM: CPT

## 2019-08-11 PROCEDURE — 96374 THER/PROPH/DIAG INJ IV PUSH: CPT

## 2019-08-11 PROCEDURE — 36415 COLL VENOUS BLD VENIPUNCTURE: CPT

## 2019-08-11 PROCEDURE — 77030010545

## 2019-08-11 PROCEDURE — 83690 ASSAY OF LIPASE: CPT

## 2019-08-11 PROCEDURE — 65660000000 HC RM CCU STEPDOWN

## 2019-08-11 PROCEDURE — 51798 US URINE CAPACITY MEASURE: CPT

## 2019-08-11 PROCEDURE — 80053 COMPREHEN METABOLIC PANEL: CPT

## 2019-08-11 PROCEDURE — 74011250636 HC RX REV CODE- 250/636: Performed by: STUDENT IN AN ORGANIZED HEALTH CARE EDUCATION/TRAINING PROGRAM

## 2019-08-11 PROCEDURE — 99285 EMERGENCY DEPT VISIT HI MDM: CPT

## 2019-08-11 PROCEDURE — 77030011943

## 2019-08-11 PROCEDURE — 96375 TX/PRO/DX INJ NEW DRUG ADDON: CPT

## 2019-08-11 PROCEDURE — 83935 ASSAY OF URINE OSMOLALITY: CPT

## 2019-08-11 PROCEDURE — 82570 ASSAY OF URINE CREATININE: CPT

## 2019-08-11 PROCEDURE — 74011250636 HC RX REV CODE- 250/636: Performed by: PHYSICIAN ASSISTANT

## 2019-08-11 RX ORDER — LEVOTHYROXINE SODIUM 112 UG/1
112 TABLET ORAL
Status: DISCONTINUED | OUTPATIENT
Start: 2019-08-12 | End: 2019-08-13 | Stop reason: HOSPADM

## 2019-08-11 RX ORDER — SODIUM CHLORIDE 0.9 % (FLUSH) 0.9 %
5-40 SYRINGE (ML) INJECTION AS NEEDED
Status: DISCONTINUED | OUTPATIENT
Start: 2019-08-11 | End: 2019-08-13 | Stop reason: HOSPADM

## 2019-08-11 RX ORDER — ENOXAPARIN SODIUM 100 MG/ML
40 INJECTION SUBCUTANEOUS EVERY 24 HOURS
Status: DISCONTINUED | OUTPATIENT
Start: 2019-08-11 | End: 2019-08-13 | Stop reason: HOSPADM

## 2019-08-11 RX ORDER — HYDRALAZINE HYDROCHLORIDE 20 MG/ML
10 INJECTION INTRAMUSCULAR; INTRAVENOUS
Status: COMPLETED | OUTPATIENT
Start: 2019-08-11 | End: 2019-08-11

## 2019-08-11 RX ORDER — SODIUM CHLORIDE 0.9 % (FLUSH) 0.9 %
5-40 SYRINGE (ML) INJECTION EVERY 8 HOURS
Status: DISCONTINUED | OUTPATIENT
Start: 2019-08-11 | End: 2019-08-13 | Stop reason: HOSPADM

## 2019-08-11 RX ORDER — SODIUM CHLORIDE 9 MG/ML
150 INJECTION, SOLUTION INTRAVENOUS CONTINUOUS
Status: DISCONTINUED | OUTPATIENT
Start: 2019-08-11 | End: 2019-08-13

## 2019-08-11 RX ORDER — LISINOPRIL 20 MG/1
20 TABLET ORAL DAILY
Status: ON HOLD | COMMUNITY
End: 2019-08-12

## 2019-08-11 RX ORDER — LORATADINE 10 MG/1
10 TABLET ORAL DAILY
Status: DISCONTINUED | OUTPATIENT
Start: 2019-08-12 | End: 2019-08-13 | Stop reason: HOSPADM

## 2019-08-11 RX ORDER — ASPIRIN 81 MG/1
81 TABLET ORAL DAILY
Status: DISCONTINUED | OUTPATIENT
Start: 2019-08-12 | End: 2019-08-13 | Stop reason: HOSPADM

## 2019-08-11 RX ORDER — ONDANSETRON 2 MG/ML
4 INJECTION INTRAMUSCULAR; INTRAVENOUS
Status: DISCONTINUED | OUTPATIENT
Start: 2019-08-11 | End: 2019-08-13 | Stop reason: HOSPADM

## 2019-08-11 RX ORDER — LORATADINE 10 MG/1
10 TABLET ORAL DAILY
Status: DISCONTINUED | OUTPATIENT
Start: 2019-08-12 | End: 2019-08-11

## 2019-08-11 RX ORDER — ONDANSETRON 2 MG/ML
4 INJECTION INTRAMUSCULAR; INTRAVENOUS
Status: COMPLETED | OUTPATIENT
Start: 2019-08-11 | End: 2019-08-11

## 2019-08-11 RX ORDER — LANOLIN ALCOHOL/MO/W.PET/CERES
3 CREAM (GRAM) TOPICAL
Status: DISCONTINUED | OUTPATIENT
Start: 2019-08-12 | End: 2019-08-13 | Stop reason: HOSPADM

## 2019-08-11 RX ADMIN — LORATADINE 10 MG: 10 TABLET ORAL at 23:59

## 2019-08-11 RX ADMIN — MELATONIN TAB 3 MG 3 MG: 3 TAB at 23:59

## 2019-08-11 RX ADMIN — HYDRALAZINE HYDROCHLORIDE 10 MG: 20 INJECTION INTRAMUSCULAR; INTRAVENOUS at 19:41

## 2019-08-11 RX ADMIN — Medication 10 ML: at 22:51

## 2019-08-11 RX ADMIN — IOPAMIDOL 100 ML: 755 INJECTION, SOLUTION INTRAVENOUS at 21:04

## 2019-08-11 RX ADMIN — ONDANSETRON 4 MG: 2 INJECTION INTRAMUSCULAR; INTRAVENOUS at 18:22

## 2019-08-11 RX ADMIN — SODIUM CHLORIDE 500 ML: 900 INJECTION, SOLUTION INTRAVENOUS at 18:24

## 2019-08-11 RX ADMIN — ENOXAPARIN SODIUM 40 MG: 40 INJECTION SUBCUTANEOUS at 22:50

## 2019-08-11 RX ADMIN — SODIUM CHLORIDE 125 ML/HR: 900 INJECTION, SOLUTION INTRAVENOUS at 22:44

## 2019-08-11 NOTE — ED TRIAGE NOTES
Pt arrives with the c/o of diarrhea for the last two days and increased weakness, pt has had intermittent abdominal pain. Pt denies any other symptoms.

## 2019-08-11 NOTE — PROGRESS NOTES
[facility] Resident Admission Note    CC: Hyponatremia     HPI:  Iraj Lagunas is a 80 y.o. male with a history of CAD s/p CABG (2001), HTN, Hypothyroidism, HLD and prostate cancer s/p prostatectomy who presents to the ER via EMS complaining of persistent diarrhea and fatigue. Pt reports that symptoms started about 2 days ago with \"multiple\" episodes of non-bloody diarrhea. Pt does not recall what he must have eaten the night before or other possible triggers. Not on any antibiotic or PPI. Endorses some lower abdominal cramping. Denies any fever, chills, chest pain, sob, dizziness or headache. He however has nausea but denies any vomiting. He drinks 1/2 glass of wine on social occasions only and does not smoke. Chart reviewed. Patient seen, examined, and discussed with Dr. Edgar Zelaya (PGY-1). See H&P note for more details. A/P: 81 yo male with a PMHx of CAD s/p CABG (2001), HTN, Hypothyroidism, HLD and prostate cancer s/p prostatectomy admitted for Acute hyponatremia. Acute Hyponatremia: . Likely secondary to volume depletion. Pt currently alert and oriented. Received 500cc NS bolus in ER.    - Admit to telemetry   -  mL/hr (goal is to correct  4-6 mEq/L over 6 hrs or less. No more than 8mEq/L in 24 hrs). - Strict I/O, monitor for UOP  - Stop Prinzide for now. Will manage BP with non-diuretic options until Sodium normalizes  - Follow up urine lytes   - BMP q4hr  - seizure precautions, neuro checks, fall precaution   - Will consider Nephro consult if no improvement    Diarrhea: Etiology unknown. Pt unsure of what could have caused . Nml WBC and stable vitals. - Stool studies (Ova/parasite, WBC stool, stool cx)  - Fluid resuscitation   - CT abd/pelv given abd cramping    I agree with remaining assessment and plan as documented in Dr. Shereen Macdonald note.       Pt discussed with Silas (on-call attending physician)    Antonio Araujo MD  Family Medicine Resident

## 2019-08-12 ENCOUNTER — APPOINTMENT (OUTPATIENT)
Dept: CT IMAGING | Age: 84
DRG: 641 | End: 2019-08-12
Attending: STUDENT IN AN ORGANIZED HEALTH CARE EDUCATION/TRAINING PROGRAM
Payer: MEDICARE

## 2019-08-12 LAB
ANION GAP SERPL CALC-SCNC: 11 MMOL/L (ref 5–15)
ANION GAP SERPL CALC-SCNC: 7 MMOL/L (ref 5–15)
ANION GAP SERPL CALC-SCNC: 8 MMOL/L (ref 5–15)
ANION GAP SERPL CALC-SCNC: 9 MMOL/L (ref 5–15)
APPEARANCE UR: CLEAR
BACTERIA URNS QL MICRO: NEGATIVE /HPF
BASOPHILS # BLD: 0 K/UL (ref 0–0.1)
BASOPHILS NFR BLD: 0 % (ref 0–1)
BILIRUB UR QL: NEGATIVE
BUN SERPL-MCNC: 12 MG/DL (ref 6–20)
BUN SERPL-MCNC: 13 MG/DL (ref 6–20)
BUN SERPL-MCNC: 14 MG/DL (ref 6–20)
BUN SERPL-MCNC: 15 MG/DL (ref 6–20)
BUN/CREAT SERPL: 11 (ref 12–20)
BUN/CREAT SERPL: 12 (ref 12–20)
BUN/CREAT SERPL: 13 (ref 12–20)
BUN/CREAT SERPL: 13 (ref 12–20)
CALCIUM SERPL-MCNC: 7.7 MG/DL (ref 8.5–10.1)
CALCIUM SERPL-MCNC: 7.9 MG/DL (ref 8.5–10.1)
CALCIUM SERPL-MCNC: 7.9 MG/DL (ref 8.5–10.1)
CALCIUM SERPL-MCNC: 8 MG/DL (ref 8.5–10.1)
CHLORIDE SERPL-SCNC: 90 MMOL/L (ref 97–108)
CHLORIDE SERPL-SCNC: 92 MMOL/L (ref 97–108)
CHLORIDE SERPL-SCNC: 94 MMOL/L (ref 97–108)
CHLORIDE SERPL-SCNC: 95 MMOL/L (ref 97–108)
CO2 SERPL-SCNC: 20 MMOL/L (ref 21–32)
CO2 SERPL-SCNC: 20 MMOL/L (ref 21–32)
CO2 SERPL-SCNC: 21 MMOL/L (ref 21–32)
CO2 SERPL-SCNC: 23 MMOL/L (ref 21–32)
COLOR UR: NORMAL
CREAT SERPL-MCNC: 1.04 MG/DL (ref 0.7–1.3)
CREAT SERPL-MCNC: 1.09 MG/DL (ref 0.7–1.3)
CREAT SERPL-MCNC: 1.13 MG/DL (ref 0.7–1.3)
CREAT SERPL-MCNC: 1.16 MG/DL (ref 0.7–1.3)
CREAT UR-MCNC: <13 MG/DL
DIFFERENTIAL METHOD BLD: ABNORMAL
EOSINOPHIL # BLD: 0 K/UL (ref 0–0.4)
EOSINOPHIL NFR BLD: 0 % (ref 0–7)
EPITH CASTS URNS QL MICRO: NORMAL /LPF
ERYTHROCYTE [DISTWIDTH] IN BLOOD BY AUTOMATED COUNT: 12.4 % (ref 11.5–14.5)
GLUCOSE SERPL-MCNC: 106 MG/DL (ref 65–100)
GLUCOSE SERPL-MCNC: 71 MG/DL (ref 65–100)
GLUCOSE SERPL-MCNC: 85 MG/DL (ref 65–100)
GLUCOSE SERPL-MCNC: 98 MG/DL (ref 65–100)
GLUCOSE UR STRIP.AUTO-MCNC: NEGATIVE MG/DL
HCT VFR BLD AUTO: 38 % (ref 36.6–50.3)
HGB BLD-MCNC: 13.4 G/DL (ref 12.1–17)
HGB UR QL STRIP: NEGATIVE
HYALINE CASTS URNS QL MICRO: NORMAL /LPF (ref 0–5)
IMM GRANULOCYTES # BLD AUTO: 0 K/UL (ref 0–0.04)
IMM GRANULOCYTES NFR BLD AUTO: 0 % (ref 0–0.5)
KETONES UR QL STRIP.AUTO: NEGATIVE MG/DL
LEUKOCYTE ESTERASE UR QL STRIP.AUTO: NEGATIVE
LYMPHOCYTES # BLD: 1.1 K/UL (ref 0.8–3.5)
LYMPHOCYTES NFR BLD: 12 % (ref 12–49)
MCH RBC QN AUTO: 29.2 PG (ref 26–34)
MCHC RBC AUTO-ENTMCNC: 35.3 G/DL (ref 30–36.5)
MCV RBC AUTO: 82.8 FL (ref 80–99)
MONOCYTES # BLD: 0.7 K/UL (ref 0–1)
MONOCYTES NFR BLD: 8 % (ref 5–13)
NEUTS SEG # BLD: 7.6 K/UL (ref 1.8–8)
NEUTS SEG NFR BLD: 80 % (ref 32–75)
NITRITE UR QL STRIP.AUTO: NEGATIVE
NRBC # BLD: 0 K/UL (ref 0–0.01)
NRBC BLD-RTO: 0 PER 100 WBC
OSMOLALITY SERPL: 260 MOSM/KG H2O
OSMOLALITY UR: 138 MOSM/KG H2O
PH UR STRIP: 6.5 [PH] (ref 5–8)
PLATELET # BLD AUTO: 229 K/UL (ref 150–400)
PMV BLD AUTO: 8.6 FL (ref 8.9–12.9)
POTASSIUM SERPL-SCNC: 4.2 MMOL/L (ref 3.5–5.1)
POTASSIUM SERPL-SCNC: 4.2 MMOL/L (ref 3.5–5.1)
POTASSIUM SERPL-SCNC: 4.3 MMOL/L (ref 3.5–5.1)
POTASSIUM SERPL-SCNC: 4.3 MMOL/L (ref 3.5–5.1)
PROT UR STRIP-MCNC: NEGATIVE MG/DL
RBC # BLD AUTO: 4.59 M/UL (ref 4.1–5.7)
RBC #/AREA URNS HPF: NORMAL /HPF (ref 0–5)
SODIUM SERPL-SCNC: 121 MMOL/L (ref 136–145)
SODIUM SERPL-SCNC: 121 MMOL/L (ref 136–145)
SODIUM SERPL-SCNC: 124 MMOL/L (ref 136–145)
SODIUM SERPL-SCNC: 124 MMOL/L (ref 136–145)
SODIUM UR-SCNC: 44 MMOL/L
SP GR UR REFRACTOMETRY: 1.01 (ref 1–1.03)
UR CULT HOLD, URHOLD: NORMAL
UROBILINOGEN UR QL STRIP.AUTO: 0.2 EU/DL (ref 0.2–1)
WBC # BLD AUTO: 9.5 K/UL (ref 4.1–11.1)
WBC URNS QL MICRO: NORMAL /HPF (ref 0–4)

## 2019-08-12 PROCEDURE — 85025 COMPLETE CBC W/AUTO DIFF WBC: CPT

## 2019-08-12 PROCEDURE — 71260 CT THORAX DX C+: CPT

## 2019-08-12 PROCEDURE — 74011250636 HC RX REV CODE- 250/636: Performed by: STUDENT IN AN ORGANIZED HEALTH CARE EDUCATION/TRAINING PROGRAM

## 2019-08-12 PROCEDURE — 36415 COLL VENOUS BLD VENIPUNCTURE: CPT

## 2019-08-12 PROCEDURE — 77030034696 HC CATH URETH FOL 2W BARD -A

## 2019-08-12 PROCEDURE — 74011250637 HC RX REV CODE- 250/637: Performed by: STUDENT IN AN ORGANIZED HEALTH CARE EDUCATION/TRAINING PROGRAM

## 2019-08-12 PROCEDURE — 94760 N-INVAS EAR/PLS OXIMETRY 1: CPT

## 2019-08-12 PROCEDURE — 97535 SELF CARE MNGMENT TRAINING: CPT

## 2019-08-12 PROCEDURE — 97116 GAIT TRAINING THERAPY: CPT

## 2019-08-12 PROCEDURE — 80048 BASIC METABOLIC PNL TOTAL CA: CPT

## 2019-08-12 PROCEDURE — 97165 OT EVAL LOW COMPLEX 30 MIN: CPT

## 2019-08-12 PROCEDURE — 74011636320 HC RX REV CODE- 636/320: Performed by: RADIOLOGY

## 2019-08-12 PROCEDURE — 65660000000 HC RM CCU STEPDOWN

## 2019-08-12 PROCEDURE — 81001 URINALYSIS AUTO W/SCOPE: CPT

## 2019-08-12 PROCEDURE — 77030010545

## 2019-08-12 PROCEDURE — 97162 PT EVAL MOD COMPLEX 30 MIN: CPT

## 2019-08-12 RX ORDER — FLUTICASONE PROPIONATE 50 MCG
2 SPRAY, SUSPENSION (ML) NASAL DAILY
Status: DISCONTINUED | OUTPATIENT
Start: 2019-08-12 | End: 2019-08-13 | Stop reason: HOSPADM

## 2019-08-12 RX ORDER — LISINOPRIL AND HYDROCHLOROTHIAZIDE 20; 25 MG/1; MG/1
1 TABLET ORAL DAILY
COMMUNITY
End: 2019-08-13

## 2019-08-12 RX ORDER — LISINOPRIL 20 MG/1
20 TABLET ORAL DAILY
Status: DISCONTINUED | OUTPATIENT
Start: 2019-08-12 | End: 2019-08-13 | Stop reason: HOSPADM

## 2019-08-12 RX ORDER — DICYCLOMINE HYDROCHLORIDE 10 MG/1
10 CAPSULE ORAL
COMMUNITY
End: 2019-11-20 | Stop reason: SDUPTHER

## 2019-08-12 RX ORDER — ACETAMINOPHEN 325 MG/1
650 TABLET ORAL
Status: DISCONTINUED | OUTPATIENT
Start: 2019-08-12 | End: 2019-08-13 | Stop reason: HOSPADM

## 2019-08-12 RX ORDER — IBUPROFEN 200 MG
400 TABLET ORAL
Status: DISCONTINUED | OUTPATIENT
Start: 2019-08-12 | End: 2019-08-13 | Stop reason: HOSPADM

## 2019-08-12 RX ORDER — LORAZEPAM 2 MG/1
1 TABLET ORAL
COMMUNITY
End: 2019-11-07 | Stop reason: SDUPTHER

## 2019-08-12 RX ADMIN — IBUPROFEN 400 MG: 200 TABLET, FILM COATED ORAL at 01:03

## 2019-08-12 RX ADMIN — DEXTRAN 70, AND HYPROMELLOSE 2910 2 DROP: 1; 3 SOLUTION/ DROPS OPHTHALMIC at 00:05

## 2019-08-12 RX ADMIN — SODIUM CHLORIDE 150 ML/HR: 900 INJECTION, SOLUTION INTRAVENOUS at 17:59

## 2019-08-12 RX ADMIN — LISINOPRIL 20 MG: 20 TABLET ORAL at 10:18

## 2019-08-12 RX ADMIN — ASPIRIN 81 MG: 81 TABLET ORAL at 08:30

## 2019-08-12 RX ADMIN — DEXTRAN 70, AND HYPROMELLOSE 2910 2 DROP: 1; 3 SOLUTION/ DROPS OPHTHALMIC at 10:20

## 2019-08-12 RX ADMIN — ONDANSETRON 4 MG: 2 INJECTION INTRAMUSCULAR; INTRAVENOUS at 00:57

## 2019-08-12 RX ADMIN — SODIUM CHLORIDE 125 ML/HR: 900 INJECTION, SOLUTION INTRAVENOUS at 06:47

## 2019-08-12 RX ADMIN — MELATONIN TAB 3 MG 3 MG: 3 TAB at 21:37

## 2019-08-12 RX ADMIN — Medication 10 ML: at 21:37

## 2019-08-12 RX ADMIN — SODIUM CHLORIDE 150 ML/HR: 900 INJECTION, SOLUTION INTRAVENOUS at 12:06

## 2019-08-12 RX ADMIN — Medication 10 ML: at 13:14

## 2019-08-12 RX ADMIN — FLUTICASONE PROPIONATE 2 SPRAY: 50 SPRAY, METERED NASAL at 03:04

## 2019-08-12 RX ADMIN — Medication 10 ML: at 05:58

## 2019-08-12 RX ADMIN — ONDANSETRON 4 MG: 2 INJECTION INTRAMUSCULAR; INTRAVENOUS at 17:55

## 2019-08-12 RX ADMIN — ENOXAPARIN SODIUM 40 MG: 40 INJECTION SUBCUTANEOUS at 21:37

## 2019-08-12 RX ADMIN — SODIUM CHLORIDE 500 ML: 900 INJECTION, SOLUTION INTRAVENOUS at 10:27

## 2019-08-12 RX ADMIN — IOPAMIDOL 100 ML: 612 INJECTION, SOLUTION INTRAVENOUS at 11:09

## 2019-08-12 RX ADMIN — ONDANSETRON 4 MG: 2 INJECTION INTRAMUSCULAR; INTRAVENOUS at 10:42

## 2019-08-12 RX ADMIN — ACETAMINOPHEN 650 MG: 325 TABLET ORAL at 20:39

## 2019-08-12 RX ADMIN — LEVOTHYROXINE SODIUM 112 MCG: 112 TABLET ORAL at 05:57

## 2019-08-12 NOTE — PROGRESS NOTES
Problem: Self Care Deficits Care Plan (Adult)  Goal: *Acute Goals and Plan of Care (Insert Text)  Description    FUNCTIONAL STATUS PRIOR TO ADMISSION: Patient was independent and active without use of DME.    HOME SUPPORT: The patient lived alone with no local support. Occupational Therapy Goals  Initiated 8/12/2019  1. Patient will perform upper body dressing and bathing with independence within 7 day(s). 2.  Patient will perform lower body dressing and bathing with supervision/set-up within 7 day(s). 3.  Patient will perform toilet transfers with supervision/set-up within 7 day(s). 4.  Patient will perform all aspects of toileting with modified independence within 7 day(s). 5.  Patient will participate in upper extremity therapeutic exercise/activities with independence for 10 minutes within 7 day(s). 6.  Patient will utilize energy conservation techniques during functional activities with verbal cues within 7 day(s). Outcome: Progressing Towards Goal   OCCUPATIONAL THERAPY EVALUATION  Patient: Dinorah Shahid (64 y.o. male)  Date: 8/12/2019  Primary Diagnosis: Acute hyponatremia [E87.1]        Precautions:  Fall    ASSESSMENT  Based on the objective data described below, the patient presents with decreased activity tolerance, poor endurance, and generalized weakness following admission on 8/11 for hyponatremia with c/o diarrhea and weakness. Patient presenting with generalized tremors/body shakes and nausea limiting participation today. Patient was alert, oriented x4, followed all commands,and motivated to return to baseline level of independence. Patient tolerated transfer OOB to the bathroom for toileting needs and then to the chair to remain OOB at end of tx. Current Level of Function Impacting Discharge (ADLs/self-care): Patient requires min A for both LB ADLs and ADL transfers. Functional Outcome Measure: The patient scored 50/100 on the Barthel Index outcome measure. Other factors to consider for discharge: Lives alone     Patient will benefit from skilled therapy intervention to address the above noted impairments. PLAN :  Recommendations and Planned Interventions: self care training, functional mobility training, therapeutic exercise, therapeutic activities, patient education, home safety training and family training/education    Frequency/Duration: Patient will be followed by occupational therapy 5 times a week to address goals. Recommendation for discharge: (in order for the patient to meet his/her long term goals)  To be determined: once medically stable     This discharge recommendation:  A follow-up discussion with the attending provider and/or case management is planned    Equipment recommendations for successful discharge (if) home: none at this time        SUBJECTIVE:   Patient stated I am still so nauseous.     OBJECTIVE DATA SUMMARY:   HISTORY:   Past Medical History:   Diagnosis Date    CAD (coronary artery disease)     Cancer ([facility] [address]     prostate    Hypercholesterolemia     Hypertension      Past Surgical History:   Procedure Laterality Date    ENDOSCOPY, COLON, DIAGNOSTIC  2008    Gregg Grace -- 5 year fu    HX CATARACT REMOVAL      Left and Right    HX COLONOSCOPY      12/2016 Dr. John Robison GRAFT  4/9/2001    [address] [place] [address], 2009    HX PROSTATECTOMY  1995    HX TONSILLECTOMY         Expanded or extensive additional review of patient history:     Home Situation  Home Environment: Private residence  One/Two Story Residence: Two story  Living Alone: Yes  Support Systems: Friends \ neighbors  Patient Expects to be Discharged to[de-identified] Private residence  Current DME Used/Available at Home: Daphne Lucas, straight, Walker, rolling    Hand dominance: Right    EXAMINATION OF PERFORMANCE DEFICITS:  Cognitive/Behavioral Status:  Neurologic State: Alert  Orientation Level: Disoriented to place;Oriented to person;Oriented to situation;Oriented to time  Cognition: Follows commands    Skin: Intact in the uppers    Edema: None noted in the uppers    Hearing: Auditory  Auditory Impairment: Hard of hearing, bilateral    Range of Motion:  AROM: Within functional limits  PROM: Within functional limits    Strength:  Strength: Generally decreased, functional in the uppers    Coordination:  Coordination: Generally decreased, functional    Tone & Sensation:  Tone: Abnormal(resting tremor)  Sensation: Intact     Balance:  Sitting: Intact  Standing: Impaired  Standing - Static: Constant support  Standing - Dynamic : Constant support    Functional Mobility and Transfers for ADLs:  Bed Mobility:  Rolling: Minimum assistance  Supine to Sit: Minimum assistance    Transfers:  Sit to Stand: Minimum assistance  Stand to Sit: Minimum assistance  Bed to Chair: Minimum assistance  Bathroom Mobility: Minimum assistance  Toilet Transfer : Minimum assistance    ADL Assessment:  Feeding: Independent    Oral Facial Hygiene/Grooming: Supervision;Setup    Bathing: Minimum assistance    Upper Body Dressing: Supervision;Setup    Lower Body Dressing: Minimum assistance    Toileting: Minimum assistance    Functional Measure:  Barthel Index:    Bathin  Bladder: 10  Bowels: 10  Groomin  Dressin  Feeding: 10  Mobility: 0  Stairs: 0  Toilet Use: 5  Transfer (Bed to Chair and Back): 10  Total: 50/100        Percentage of impairment   0%   1-19%   20-39%   40-59%   60-79%   80-99%   100%   Barthel Score 0-100 100 99-80 79-60 59-40 20-39 1-19   0     The Barthel ADL Index: Guidelines  1. The index should be used as a record of what a patient does, not as a record of what a patient could do. 2. The main aim is to establish degree of independence from any help, physical or verbal, however minor and for whatever reason. 3. The need for supervision renders the patient not independent. 4. A patient's performance should be established using the best available evidence.  Asking the patient, friends/relatives and nurses are the usual sources, but direct observation and common sense are also important. However direct testing is not needed. 5. Usually the patient's performance over the preceding 24-48 hours is important, but occasionally longer periods will be relevant. 6. Middle categories imply that the patient supplies over 50 per cent of the effort. 7. Use of aids to be independent is allowed. Fabiana Long., Barthel, D.W. (5774). Functional evaluation: the Barthel Index. [address] [facility] (14)2. SADAF Mahan, Ira Nichols., Elhamchase Garcia., [place], [address] (). Measuring the change indisability after inpatient rehabilitation; comparison of the responsiveness of the Barthel Index and Functional [place] Measure. Journal of Neurology, Neurosurgery, and Psychiatry, 66(4), 867-651. Manav Reid, NDungJ.A, HARI Ralph, & Kassidy Roy M.A. (2004.) Assessment of post-stroke quality of life in cost-effectiveness studies: The usefulness of the Barthel Index and the EuroQoL-5D. Quality of Life Research, 15, 107-94        Occupational Therapy Evaluation Charge Determination   History Examination Decision-Making   LOW Complexity : Brief history review  LOW Complexity : 1-3 performance deficits relating to physical, cognitive , or psychosocial skils that result in activity limitations and / or participation restrictions  LOW Complexity : No comorbidities that affect functional and no verbal or physical assistance needed to complete eval tasks       Based on the above components, the patient evaluation is determined to be of the following complexity level: LOW   Pain Ratin/10    Activity Tolerance:   Good  Please refer to the flowsheet for vital signs taken during this treatment. After treatment patient left in no apparent distress:    Sitting in chair, Call bell within reach, Bed / chair alarm activated.       COMMUNICATION/EDUCATION:   The patients plan of care was discussed with: Physical Therapist, Registered Nurse and patient . Home safety education was provided and the patient/caregiver indicated understanding., Patient/family have participated as able in goal setting and plan of care. and Patient/family agree to work toward stated goals and plan of care. This patients plan of care is appropriate for delegation to [facility] [place].     Thank you for this referral.  Audie Roach, OTR/L  Time Calculation: 21 mins

## 2019-08-12 NOTE — PROGRESS NOTES
[address] RESIDENCY PROGRAM  PROGRESS NOTE     8/13/2019  PCP: Terri Humphreys MD     Assessment/Plan:   Charlotte Lassiter is a 80 y.o. male with known h/o diverticulitis, ischemic colitis, CAD s/p CABG 2001, hypothyroidism, HTN/HLD and prostate cancer s/p prostatectomy who presents to the ER complaining of 2 days of watery diarrhea, nausea and abdominal pain who is admitted for acute hyponatremia. 24 hour events: Was forgetful overnight, needed to be reoriented by nurses. Otherwise no acute events. Acute Hypovolemic hypotonic hyponatremia- POA Na 116. Likely 2/2  profuse diarrhea as sodium on 7/25 was 140. Ur Na 44, Ur osmo 138, serum osmo 260. Most recent Na 130  - NS at 150ml/hr plus regular diet  - BMP Q4H  - Zofran PRN for nausea  - Will c/s Nephrology if hyponatremia does not improve on current treatment plan.     Diarrhea- Unkown etiology. Hx of diverticulitis and perforation from abscess. No BM since admission. CT abdomen no acute process  - Stool studies- WBC, ova & parasites, culture if able to collect  - Fluid resuscitation as above     Hypertension: Holding home Prinzide due to hyponatremia.   - Continue Lisinopril 20mg daily  - Will continue to monitor at this time and readjust as BP's trend.     Hypothyroidism- Last TSH 7/25 and was 2.160.   - Continue home synthroid 112 mcg     CAD s/p CABG 2001- No echo on file. No anginal complaints at this time  - Monitor on telemetry  - Continue home ASA 81mg     Allergies  - Continue xyzal substitution per pharmacy protocol  - Continue flonase for sinus congestion     Insomnia  - Holding home ativan 2mg for sleep  - Continue Melatonin 3mg QHS      FEN/GI - Regular diet. NS at 150 mL/hr. Activity - Ambulate with assistance  DVT prophylaxis - Lovenox  GI prophylaxis - Not indicated at this time  Fall prophylaxis - Fall precautions ordered. Disposition - Admit to Telemetry. Plan to d/c to Home.  Consulting PT/OT/CM  Code Status - Full, discussed with patient / caregivers. Next of Naz Arreguin Name and Contact Daughter: Nuha Ceja- Pt unable to provide number and not in chart     Patient Marco Antonio Leiva will be discussed with Dr. Davi Sal     Subjective:   Pt was seen and examined at bedside. No complaints over night. Has not had a BM since admission. Denies chest pain, SOB, vomiting, abdominal pain, dizziness. Objective:   Physical examination  Visit Vitals  /60 (BP 1 Location: Right arm, BP Patient Position: Post activity)   Pulse 81   Temp 99 °F (37.2 °C)   Resp 13   Ht 5' 4\" (1.626 m)   Wt 168 lb 6.4 oz (76.4 kg)   SpO2 98%   BMI 28.91 kg/m²      Temp (24hrs), Av °F (36.7 °C), Min:97.5 °F (36.4 °C), Max:99 °F (37.2 °C)         O2 Device: Room air    Date 19 - 19 0619 - 19 0659   Shift 4937-7027 3523-3549 24 Hour Total 9594-0363 7670-8807 24 Hour Total   INTAKE   P.O. 300  300        P. O. 300  300      Shift Total(mL/kg) 300(3.9)  300(3.9)      OUTPUT   Urine(mL/kg/hr) 9855(3.5) 207 4625        Urine Voided 2550  4625      Shift Total(mL/kg) 7790(04.7) 2559(56.3) 6420(92.3)      NET - - -      Weight (kg) 76.4 76.4 76.4 76.4 76.4 76.4         Last 3 shifts:    701 - 1900  In: 9253 [P.O.:1260;  I.V.:1925]  Out: 3750 [Urine:3750]    General:   Alert, cooperative, no acute distress   Head:   Atraumatic   Eyes:   Conjunctivae clear   ENT:  Oral mucosa normal, moist   Neck:  Supple, trachea midline, no adenopathy   No JVD   Back:    No CVA tenderness    Chest wall:    No tenderness or deformities    Lungs:   Clear to auscultation bilaterally    Heart:   Regular rhythm, aortic systolic ejection murmur   Abdomen:    Soft, non distended, mildly TTP LLQ   No masses or organomegaly    Extremities:  No edema or DVT signs   Pulses:  Symmetric all extremities   Skin:  Warm and dry    No rashes or lesions   Neurologic:  Oriented   No focal deficits   Urinary catheter:  condom catheter     Data Review:     Recent Labs     08/13/19  0104 08/12/19  0213 08/11/19  1814   WBC 8.2 9.5 10.1   HGB 13.1 13.4 13.7   HCT 38.1 38.0 38.4    229 287     Recent Labs     08/13/19  0104 08/12/19  1703 08/12/19  0944  08/11/19  1844   * 124* 124*   < > 119*   K 4.5 4.3 4.3   < > 4.3   CL 99 95* 94*   < > 88*   CO2 24 21 23   < > 22   GLU 80 71 106*   < > 87   BUN 10 12 13   < > 19   CREA 1.06 1.13 1.09   < > 1.12   CA 7.8* 8.0* 7.7*   < > 7.9*   ALB  --   --   --   --  3.5   TBILI  --   --   --   --  0.5   SGOT  --   --   --   --  17   ALT  --   --   --   --  15    < > = values in this interval not displayed. Medications reviewed  Current Facility-Administered Medications   Medication Dose Route Frequency    ibuprofen (MOTRIN) tablet 400 mg  400 mg Oral Q4H PRN    acetaminophen (TYLENOL) tablet 650 mg  650 mg Oral Q6H PRN    fluticasone propionate (FLONASE) 50 mcg/actuation nasal spray 2 Spray  2 Spray Both Nostrils DAILY    lisinopril (PRINIVIL, ZESTRIL) tablet 20 mg  20 mg Oral DAILY    0.9% sodium chloride infusion  150 mL/hr IntraVENous CONTINUOUS    sodium chloride (NS) flush 5-40 mL  5-40 mL IntraVENous Q8H    sodium chloride (NS) flush 5-40 mL  5-40 mL IntraVENous PRN    ondansetron (ZOFRAN) injection 4 mg  4 mg IntraVENous Q4H PRN    enoxaparin (LOVENOX) injection 40 mg  40 mg SubCUTAneous Q24H    levothyroxine (SYNTHROID) tablet 112 mcg  112 mcg Oral 6am    aspirin delayed-release tablet 81 mg  81 mg Oral DAILY    melatonin tablet 3 mg  3 mg Oral QHS    loratadine (CLARITIN) tablet 10 mg  10 mg Oral DAILY    artificial tears (dextran 70-hypromellose) (NATURAL BALANCE) 0.1-0.3 % ophthalmic solution 2 Drop  2 Drop Both Eyes PRN         Signed:   Dru Jung DO   Resident, Family Medicine      Attending note: Attending note to follow. ..

## 2019-08-12 NOTE — PROGRESS NOTES
Problem: Falls - Risk of  Goal: *Absence of Falls  Description  Document Jackie Germain Fall Risk and appropriate interventions in the flowsheet.   Outcome: Progressing Towards Goal  Note:   Fall Risk Interventions:  Mobility Interventions: Bed/chair exit alarm              Elimination Interventions: Call light in reach    History of Falls Interventions: Bed/chair exit alarm         Problem: Patient Education: Go to Patient Education Activity  Goal: Patient/Family Education  Outcome: Progressing Towards Goal

## 2019-08-12 NOTE — PROGRESS NOTES
TRANSFER - IN REPORT:    Verbal report received from AMRITA [address], RN(name) on Chapis Gann  being received from ED(unit) for routine progression of care      Report consisted of patients Situation, Background, Assessment and   Recommendations(SBAR). Information from the following report(s) SBAR, Kardex, ED Summary, MAR, Accordion, Recent Results and Cardiac Rhythm NSR was reviewed with the receiving nurse. Opportunity for questions and clarification was provided. Assessment completed upon patients arrival to unit and care assumed.

## 2019-08-12 NOTE — PROGRESS NOTES
Problem: Mobility Impaired (Adult and Pediatric)  Goal: *Acute Goals and Plan of Care (Insert Text)  Description  FUNCTIONAL STATUS PRIOR TO ADMISSION: Patient was independent and active without use of DME.    HOME SUPPORT PRIOR TO ADMISSION: The patient lived alone with no local support. Physical Therapy Goals  Initiated 8/12/2019  1. Patient will move from supine to sit and sit to supine  in bed with modified independence within 7 day(s). 2.  Patient will transfer from bed to chair and chair to bed with modified independence using the least restrictive device within 7 day(s). 3.  Patient will perform sit to stand with modified independence within 7 day(s). 4.  Patient will ambulate with modified independence for 150 feet with the least restrictive device within 7 day(s). 5.  Patient will ascend/descend 4 stairs with 2 handrail(s) with modified independence within 7 day(s). Outcome: Progressing Towards Goal  Note:   PHYSICAL THERAPY EVALUATION  Patient: Marco Antonio Leiva (89 y.o. male)  Date: 8/12/2019  Primary Diagnosis: Acute hyponatremia [E87.1]        Precautions:   Fall      ASSESSMENT  Based on the objective data described below, the patient presents with diarrhea and abdominal pain. Patient with decreased activity tolerance, strength and endurance due to increased nausea without emesis and continued hyponatremia. Patient overall was limited to short distance ambulation due to his nausea. Patient has a resting tremor and required hand held support for stability. No loss of balance. He would benefit from home health at discharge since prior to admission he was Independent. .    Current Level of Function Impacting Discharge (mobility/balance): short distance ambulation, poor endurance and increased medical complexity    Functional Outcome Measure: The patient scored 60/100 on the Barthel Index outcome measure.       Other factors to consider for discharge: lives alone     Patient will benefit from skilled therapy intervention to address the above noted impairments. PLAN :  Recommendations and Planned Interventions: bed mobility training, transfer training, gait training, therapeutic exercises, neuromuscular re-education and therapeutic activities      Frequency/Duration: Patient will be followed by physical therapy:  5 times a week to address goals. Recommendation for discharge: (in order for the patient to meet his/her long term goals)  Physical therapy at least 2 days/week in the home     This discharge recommendation:  A follow-up discussion with the attending provider and/or case management is planned    Equipment recommendations for successful discharge (if) home: patient owns a [facility]          SUBJECTIVE:   Patient stated i am so nausious.     OBJECTIVE DATA SUMMARY:   HISTORY:    Past Medical History:   Diagnosis Date    CAD (coronary artery disease)     Cancer ([facility] [address]     prostate    Hypercholesterolemia     Hypertension      Past Surgical History:   Procedure Laterality Date    ENDOSCOPY, COLON, DIAGNOSTIC  2008    Wiht Parada -- 5 year fu    HX CATARACT REMOVAL      Left and Right    HX COLONOSCOPY      12/2016 Dr. Jurado Chroman GRAFT  4/9/2001    [address] [place] [address]    HX PROSTATECTOMY  1995    HX TONSILLECTOMY         Personal factors and/or comorbidities impacting plan of care: see below    Home Situation  Home Environment: Private residence  One/Two Story Residence: Two story  Living Alone: Yes  Support Systems: Friends \ neighbors  Patient Expects to be Discharged to[de-identified] Private residence  Current DME Used/Available at Home: Marney Seal, straight, Walker, rolling    EXAMINATION/PRESENTATION/DECISION MAKING:   Critical Behavior:  Neurologic State: Alert  Orientation Level: Disoriented to place, Oriented to person, Oriented to situation, Oriented to time  Cognition: Follows commands     Hearing:   Auditory  Auditory Impairment: Hard of hearing, bilateral  Skin:  all exposed intact  Edema: none noted  Range Of Motion:  AROM: Within functional limits           PROM: Within functional limits           Strength:    Strength: Generally decreased, functional                    Tone & Sensation:   Tone: Abnormal(resting tremor)              Sensation: Intact               Coordination:  Coordination: Generally decreased, functional  Vision:      Functional Mobility:  Bed Mobility:  Rolling: Minimum assistance  Supine to Sit: Minimum assistance        Transfers:  Sit to Stand: Minimum assistance  Stand to Sit: Minimum assistance        Bed to Chair: Minimum assistance              Balance:   Sitting: Intact  Standing: Impaired  Standing - Static: Constant support  Standing - Dynamic : Constant support  Ambulation/Gait Training:  Distance (ft): 25 Feet (ft)  Assistive Device: Gait belt(hand held assist)  Ambulation - Level of Assistance: Minimal assistance     Gait Description (WDL): Exceptions to WDL                    Functional Measure:  Barthel Index:    Bathin  Bladder: 10  Bowels: 10  Groomin  Dressin  Feeding: 10  Mobility: 5  Stairs: 0  Toilet Use: 5  Transfer (Bed to Chair and Back): 10  Total: 60/100       Percentage of impairment   0%   1-19%   20-39%   40-59%   60-79%   80-99%   100%   Barthel Score 0-100 100 99-80 79-60 59-40 20-39 1-19   0     The Barthel ADL Index: Guidelines  1. The index should be used as a record of what a patient does, not as a record of what a patient could do. 2. The main aim is to establish degree of independence from any help, physical or verbal, however minor and for whatever reason. 3. The need for supervision renders the patient not independent. 4. A patient's performance should be established using the best available evidence. Asking the patient, friends/relatives and nurses are the usual sources, but direct observation and common sense are also important. However direct testing is not needed.   5. Usually the patient's performance over the preceding 24-48 hours is important, but occasionally longer periods will be relevant. 6. Middle categories imply that the patient supplies over 50 per cent of the effort. 7. Use of aids to be independent is allowed. Olimpia Bashir., Barthel, D.W. (2235). Functional evaluation: the Barthel Index. [address] [facility] (14)2. SADAF Rogers, Verner Loan., Mirna Fong., [place], [address] [place] [address] (1999). Measuring the change indisability after inpatient rehabilitation; comparison of the responsiveness of the Barthel Index and Functional [place] Measure. Journal of Neurology, Neurosurgery, and Psychiatry, 66(4), 808-727. Quinton Ayala, N.J.A, HARI Ralph, & Cleve Jennings MSANTOS. (2004.) Assessment of post-stroke quality of life in cost-effectiveness studies: The usefulness of the Barthel Index and the EuroQoL-5D. Quality of Life Research, 15, 122-39           Physical Therapy Evaluation Charge Determination   History Examination Presentation Decision-Making   HIGH Complexity :3+ comorbidities / personal factors will impact the outcome/ POC  MEDIUM Complexity : 3 Standardized tests and measures addressing body structure, function, activity limitation and / or participation in recreation  MEDIUM Complexity : Evolving with changing characteristics  Other outcome measures Barthel Index  MEDIUM      Based on the above components, the patient evaluation is determined to be of the following complexity level: MEDIUM    Pain Rating:  None reported    Activity Tolerance:   Fair  Please refer to the flowsheet for vital signs taken during this treatment. After treatment patient left in no apparent distress:   Transport arrived to take patient off the floor, he was assisted to stretched with transport staff present     COMMUNICATION/EDUCATION:   The patients plan of care was discussed with: Occupational Therapist and .     Fall prevention education was provided and the patient/caregiver indicated understanding., Patient/family have participated as able in goal setting and plan of care. and Patient/family agree to work toward stated goals and plan of care.     Thank you for this referral.  Brendan Thomas PT, DPT   Time Calculation: 16 mins

## 2019-08-12 NOTE — PROGRESS NOTES
[id number] [facility] RESIDENCY PROGRAM  PROGRESS NOTE     8/12/2019  PCP: Alley Grullon MD     Assessment/Plan:   Acute Hyponatremic Hypochloremia- POA Na 116. Likely secondary to loss from profuse diarrhea as sodium on 7/25 was 140. Received 0.5L NS bolus in ED. Will aim to increase Na by 4-6mEq/L over next 6 hours but not more than 8mEq/L in 24 hrs. Last Na at 0200 121.  - Admitted to telemetry  - Vital signs per unit routine   - NS at 125ml/hr plus regular diet  - BMP Q4H, urine lytes  - Zofran PRN for nausea  - Seizure precautions, neuro checks and fall precaution  - Strict I/O's  - Daily CBC  - Will c/s Nephrology if hyponatremia does not improve on current treatment plan.     Diarrhea- Pt unaware of PO intake prior to symptoms. Currently unknown etiology. Hx of diverticulitis and perforation from abscess. Has not had BM since admission. CT abdomen negative for any acute process. - Stool studies- WBC, ova & parasites, culture if able to collect  - Fluid resuscitation as above     Hypertension  - BP on admission 199/75. At this time, 159/72.  - Holding home Prinzide due to hyponatremia.   - Hydralazine 10mg PRN   - Will continue to monitor at this time and readjust as BP's trend.     Hypothyroidism- Last TSH 7/25 and was 2.160.   - Continue home synthroid 112 mcg     CAD s/p CABG 2001- No echo on file. No anginal complaints at this time  - Monitor on telemetry  - Continue home ASA 81mg     Allergies  - Continue xyzal substitution per pharmacy protocol  - Continue flonase for sinus congestion     Insomnia  - Holding home ativan 2mg for sleep  - Continue Melatonin 3mg QHS      FEN/GI - Regular diet. NS at 125 mL/hr. Activity - Ambulate with assistance  DVT prophylaxis - Lovenox  GI prophylaxis - Not indicated at this time  Fall prophylaxis - Fall precautions ordered. Disposition - Admit to Telemetry. Plan to d/c to Home.  Consulting PT/OT/CM  Code Status - Full, discussed with patient / caregivers. Next of Naz  Name and Contact Daughter: Calos Espinoza- Pt unable to provide number and not in chart     Patient Irma Paiz will be discussed with Dr. Link Carmona     Subjective:   Pt was seen and examined at bedside. He is much more coherent and alert this morning compared to his presentation in the ED. He had many complaints last night including insomnia due to not receiving home ativan-gave melatonin, thigh pain-given motrin, sinus congestion pain-given flonase and claritin. No BM since arrival to hospital. Continues to be afebrile and hemodynamically stable. Denies chest pain, SOB, vomiting, abdominal pain, dizziness. Endorses nausea. Objective:   Physical examination  Visit Vitals  /72 (BP 1 Location: Right arm, BP Patient Position: At rest)   Pulse 77   Temp 97.9 °F (36.6 °C)   Resp 18   Ht 5' 4\" (1.626 m)   Wt 160 lb (72.6 kg)   SpO2 97%   BMI 27.46 kg/m²      Temp (24hrs), Av.6 °F (36.4 °C), Min:97.4 °F (36.3 °C), Max:97.9 °F (36.6 °C)         O2 Device: Room air    Date 19 - 19 - 19 0659   Shift 3692-0385 2051-9110 24 Hour Total 9749-8181 3645-3521 24 Hour Total   INTAKE   I.V.(mL/kg/hr)   192        Volume (sodium chloride 0.9 % bolus infusion 500 mL)   192        Volume (0.9% sodium chloride infusion)  0 0        Volume (sodium chloride 0.9 % bolus infusion 500 mL)  0 0      Shift Total(mL/kg)  1925(26.5) 192(26.5)      OUTPUT   Urine(mL/kg/hr)  1200 1200        Urine Voided  1200 1200      Shift Total(mL/kg)  1200(16.5) 1200(16.5)      NET  725 725      Weight (kg) 72.6 72.6 72.6 72.6 72.6 72.6         Last 3 shifts:    No intake/output data recorded.     General:   Alert, cooperative, no acute distress   Head:   Atraumatic   Eyes:   Conjunctivae clear   ENT:  Oral mucosa normal, moist   Neck:  Supple, trachea midline, no adenopathy   No JVD   Back:    No CVA tenderness    Chest wall:    No tenderness or deformities Lungs:   Clear to auscultation bilaterally    Heart:   Regular rhythm, aortic systolic ejection murmur   Abdomen:    Soft, mildly TTP LLQ   No masses or organomegaly    Extremities:  No edema or DVT signs   Pulses:  Symmetric all extremities   Skin:  Warm and dry    No rashes or lesions   Neurologic:  Oriented   No focal deficits   Urinary catheter:  condom catheter     Data Review:     Recent Labs     08/12/19  0213 08/11/19  1814   WBC 9.5 10.1   HGB 13.4 13.7   HCT 38.0 38.4    287     Recent Labs     08/12/19  0213 08/11/19  2156 08/11/19  1844   * 121* 119*   K 4.2 4.2 4.3   CL 90* 90* 88*   CO2 20* 21 22   GLU 98 98 87   BUN 14 17 19   CREA 1.04 1.07 1.12   CA 7.9* 8.1* 7.9*   ALB  --   --  3.5   TBILI  --   --  0.5   SGOT  --   --  17   ALT  --   --  15     Medications reviewed  Current Facility-Administered Medications   Medication Dose Route Frequency    ibuprofen (MOTRIN) tablet 400 mg  400 mg Oral Q4H PRN    acetaminophen (TYLENOL) tablet 650 mg  650 mg Oral Q6H PRN    fluticasone propionate (FLONASE) 50 mcg/actuation nasal spray 2 Spray  2 Spray Both Nostrils DAILY    0.9% sodium chloride infusion  125 mL/hr IntraVENous CONTINUOUS    sodium chloride (NS) flush 5-40 mL  5-40 mL IntraVENous Q8H    sodium chloride (NS) flush 5-40 mL  5-40 mL IntraVENous PRN    ondansetron (ZOFRAN) injection 4 mg  4 mg IntraVENous Q4H PRN    enoxaparin (LOVENOX) injection 40 mg  40 mg SubCUTAneous Q24H    levothyroxine (SYNTHROID) tablet 112 mcg  112 mcg Oral 6am    aspirin delayed-release tablet 81 mg  81 mg Oral DAILY    melatonin tablet 3 mg  3 mg Oral QHS    loratadine (CLARITIN) tablet 10 mg  10 mg Oral DAILY    artificial tears (dextran 70-hypromellose) (NATURAL BALANCE) 0.1-0.3 % ophthalmic solution 2 Drop  2 Drop Both Eyes PRN         Signed:   Toni Bright MD   Resident, Family Medicine      Attending note: Attending note to follow. ..

## 2019-08-12 NOTE — ED NOTES
Pt Throughput: Charge Nurse on [facility]  made aware of patient's bed assignment, room 326. Bobby Zaman RN  Emergency Dept Charge RN.

## 2019-08-12 NOTE — PROGRESS NOTES
*ATTENTION:  This note has been created by a medical student for educational purposes only. Please do not refer to the content of this note for clinical decision-making, billing, or other purposes. Please see attending physicians note to obtain clinical information on this patient. *           [facility] RESIDENCY PROGRAM   Daily Progress Note    Date: 8/12/2019    Assessment/Plan:   Renae Hernandez is a 80 y.o. male with PMHx of diverticulitis, ischemic colitis, CAD s/p CABG in 2001, hypothyroidism, HTN/HLD, and prostate cancer s/p protestectomy in 1995 who is hospitalized for acute hyponatremia in the setting of 48 hrs of nonbloody diarrhea. 24 Hour Events: No acute events overnight. Acute Hyponatremia: In setting of 48 hours of nonbloody diarrhea. Has had similar episode in past with Na at 124 at [facility] in 2017. Received 500mL . 9% NaCL in ED.   -Continue maintenance . 9% NaCL at 125ml/hr. Replete fluids with goal of increasing sodium by 4-6 mEq/L over 6 hours, but not more than 8 mEq/L over 24 hours. -Most recent Na 121 at 6am. Unchanged from previous at 2am.  -Consider NaCL 3% 50mL bolus per uptodate flowchart if next BMP: Na remains at 121. .   -Consider nephrology consult. -BMPs q4hr  -Monitor Is/Os  -On telemetry  -With no improvement, keep SIADH in mind given incidental pulm nodule finding. Diarrhea/Nausea: No diarrhea since admission. -CT abd neg  -Stool studies with next bowel movement: ova/parasites  -Continue zofran 4mg q4h prn    HTN: 199/75 on admission. 10mg Hydralazine given in ED.   -Hydralazine 10mg prn  -Consider restarting home lisinopril    Hypothyroid. Most recent TSH was 2.16 on 7/25/19  - Continue home synthroid 112 mcg  - TSH    CAD s/p CABG 2001  -Monitor w/ telemetry  Continue home baby aspirin    Insomnia:   -Holding home ativan 2mg    FEN/GI - NaCL . 9% at 125 mL/hr.  500mL bolus given in ED  Activity - No restrictions  DVT prophylaxis - Enoxaparin 40mg ing q24h  GI prophylaxis -  No therapies  Disposition - Plan to d/c to home pending PT/OT eval and resolution of hyponatremia. CODE STATUS:  Full. (per Dr. Olvin Sykes note on 8/12)    Patient was discussed with Dr. Cecilia Pagan. Yu Ordaz  M4, Acting Intern  8/12/2019 8:01 AM         CC: Hyponatremia    Subjective  No acute events overnight. Patient complains of some dull suprapubic and RLQ abd pain. Endorses some cont'd nause. Denies diarrhea since admission.       Inpatient Medications  Current Facility-Administered Medications   Medication Dose Route Frequency    ibuprofen (MOTRIN) tablet 400 mg  400 mg Oral Q4H PRN    acetaminophen (TYLENOL) tablet 650 mg  650 mg Oral Q6H PRN    fluticasone propionate (FLONASE) 50 mcg/actuation nasal spray 2 Spray  2 Spray Both Nostrils DAILY    0.9% sodium chloride infusion  125 mL/hr IntraVENous CONTINUOUS    sodium chloride (NS) flush 5-40 mL  5-40 mL IntraVENous Q8H    sodium chloride (NS) flush 5-40 mL  5-40 mL IntraVENous PRN    ondansetron (ZOFRAN) injection 4 mg  4 mg IntraVENous Q4H PRN    enoxaparin (LOVENOX) injection 40 mg  40 mg SubCUTAneous Q24H    levothyroxine (SYNTHROID) tablet 112 mcg  112 mcg Oral 6am    aspirin delayed-release tablet 81 mg  81 mg Oral DAILY    melatonin tablet 3 mg  3 mg Oral QHS    loratadine (CLARITIN) tablet 10 mg  10 mg Oral DAILY    artificial tears (dextran 70-hypromellose) (NATURAL BALANCE) 0.1-0.3 % ophthalmic solution 2 Drop  2 Drop Both Eyes PRN         Allergies  Allergies   Allergen Reactions    Sulfa (Sulfonamide Antibiotics) Unknown (comments)         Objective  Vitals:  Patient Vitals for the past 8 hrs:   Temp Pulse Resp BP SpO2   08/12/19 0723 97.6 °F (36.4 °C) 81 18 172/74 96 %   08/12/19 0653  80      08/12/19 0353 97.9 °F (36.6 °C) 77 18 159/72 97 %   08/12/19 0025 97.4 °F (36.3 °C) 88 18 161/70 97 %         I/O:    Intake/Output Summary (Last 24 hours) at 8/12/2019 0801  Last data filed at 8/12/2019 0797  Gross per 24 hour   Intake 2165 ml   Output 1200 ml   Net 965 ml     Last shift:    No intake/output data recorded. Last 3 shifts:    08/10 1901 - 08/12 0700  In: 2165 [P.O.:240; I.V.:1925]  Out: 1200 [Urine:1200]    Physical Exam:  General: No acute distress. Alert. Cooperative. HEENT: Normocephalic. Atraumatic. Dewane Newness Conjunctiva pink. Sclera white. PERRL. MMM   Respiratory: CTAB. No w/r/r/c.   Cardiovascular: RRR. Normal V4,R5. 2/6 holosystolic murmur. GI: Hyperactive  bowel sounds. Nontender throughout all quadrants and suprapubic area. No rebound tenderness or guarding. Nondistended   Extremities: No edema. No tenderness. Laboratory Data  Recent Results (from the past 12 hour(s))   METABOLIC PANEL, BASIC    Collection Time: 08/11/19  9:56 PM   Result Value Ref Range    Sodium 121 (L) 136 - 145 mmol/L    Potassium 4.2 3.5 - 5.1 mmol/L    Chloride 90 (L) 97 - 108 mmol/L    CO2 21 21 - 32 mmol/L    Anion gap 10 5 - 15 mmol/L    Glucose 98 65 - 100 mg/dL    BUN 17 6 - 20 MG/DL    Creatinine 1.07 0.70 - 1.30 MG/DL    BUN/Creatinine ratio 16 12 - 20      GFR est AA >60 >60 ml/min/1.73m2    GFR est non-AA >60 >60 ml/min/1.73m2    Calcium 8.1 (L) 8.5 - 10.1 MG/DL   CBC WITH AUTOMATED DIFF    Collection Time: 08/12/19  2:13 AM   Result Value Ref Range    WBC 9.5 4.1 - 11.1 K/uL    RBC 4.59 4. 10 - 5.70 M/uL    HGB 13.4 12.1 - 17.0 g/dL    HCT 38.0 36.6 - 50.3 %    MCV 82.8 80.0 - 99.0 FL    MCH 29.2 26.0 - 34.0 PG    MCHC 35.3 30.0 - 36.5 g/dL    RDW 12.4 11.5 - 14.5 %    PLATELET 923 208 - 155 K/uL    MPV 8.6 (L) 8.9 - 12.9 FL    NRBC 0.0 0  WBC    ABSOLUTE NRBC 0.00 0.00 - 0.01 K/uL    NEUTROPHILS 80 (H) 32 - 75 %    LYMPHOCYTES 12 12 - 49 %    MONOCYTES 8 5 - 13 %    EOSINOPHILS 0 0 - 7 %    BASOPHILS 0 0 - 1 %    IMMATURE GRANULOCYTES 0 0.0 - 0.5 %    ABS. NEUTROPHILS 7.6 1.8 - 8.0 K/UL    ABS. LYMPHOCYTES 1.1 0.8 - 3.5 K/UL    ABS. MONOCYTES 0.7 0.0 - 1.0 K/UL    ABS.  EOSINOPHILS 0.0 0.0 - 0.4 K/UL    ABS. BASOPHILS 0.0 0.0 - 0.1 K/UL    ABS. IMM. GRANS. 0.0 0.00 - 0.04 K/UL    DF AUTOMATED     METABOLIC PANEL, BASIC    Collection Time: 08/12/19  2:13 AM   Result Value Ref Range    Sodium 121 (L) 136 - 145 mmol/L    Potassium 4.2 3.5 - 5.1 mmol/L    Chloride 90 (L) 97 - 108 mmol/L    CO2 20 (L) 21 - 32 mmol/L    Anion gap 11 5 - 15 mmol/L    Glucose 98 65 - 100 mg/dL    BUN 14 6 - 20 MG/DL    Creatinine 1.04 0.70 - 1.30 MG/DL    BUN/Creatinine ratio 13 12 - 20      GFR est AA >60 >60 ml/min/1.73m2    GFR est non-AA >60 >60 ml/min/1.73m2    Calcium 7.9 (L) 8.5 - 85.5 MG/DL   METABOLIC PANEL, BASIC    Collection Time: 08/12/19  6:00 AM   Result Value Ref Range    Sodium 121 (L) 136 - 145 mmol/L    Potassium 4.2 3.5 - 5.1 mmol/L    Chloride 92 (L) 97 - 108 mmol/L    CO2 20 (L) 21 - 32 mmol/L    Anion gap 9 5 - 15 mmol/L    Glucose 85 65 - 100 mg/dL    BUN 15 6 - 20 MG/DL    Creatinine 1.16 0.70 - 1.30 MG/DL    BUN/Creatinine ratio 13 12 - 20      GFR est AA >60 >60 ml/min/1.73m2    GFR est non-AA 59 (L) >60 ml/min/1.73m2    Calcium 7.9 (L) 8.5 - 10.1 MG/DL     Labs pertinent for Na 121. Imaging  CT Abd 8/12: No acute abd findings. 4.5mm right middle pulm nodule.      Hospital Problems:  Hospital Problems  Date Reviewed: 1/24/2019          Codes Class Noted POA    * (Principal) Acute hyponatremia ICD-10-CM: E87.1  ICD-9-CM: 276.1  8/11/2019 Unknown

## 2019-08-12 NOTE — H&P
[address] [facility] [address] [place] [address]   Office [phone]  Fax [phone]       Admission H&P     Name: Ann Hurt MRN: [id number]  Sex: Male   YOB: 1930  Age: 80 y.o. PCP: Charlie Thapa MD     Source of Information: patient, medical records    Chief complaint: Chel Kappa    History of Present Illness  Ann Hurt is a 80 y.o. male with known h/o diverticulitis, ischemic colitis, CAD s/p CABG 2001, hypothyroidism, HTN/HLD and prostate cancer (s/p radical prostatectomy ~1995) who presents via EMS to the ER complaining of 2 days of watery diarrhea, nausea and abdominal pain. He states that he woke up at midnight 2 days ago with diarrhea and has has innumerable episodes since then. His abdominal pain started suprapubic but is now paraumbilical and is more sore than painful at this time. He has decreased PO intake due to nausea and does not know what he ate before his symptoms began. He denies any chest pain, SoB, cough, vomiting, hematchezia, melena, PPI or Abx use. In the ED:  Vitals: Temp 97.5     /75    HR 78     RR 18   SatO2 96% on RA   Labs: Na 119, Cl 88, Cr 1.12  Imaging: None  Treatment: 0.5L NS, 10mg hydralazine, zofran    EKG: Pending    Patient Vitals for the past 12 hrs:   Temp Pulse Resp BP SpO2   08/11/19 2000    171/75 96 %   08/11/19 1930    189/68 96 %   08/11/19 1915    192/73 97 %   08/11/19 1900    185/80 98 %   08/11/19 1845    191/74 98 %   08/11/19 1815    190/73 97 %   08/11/19 1809 97.5 °F (36.4 °C) 78 18 199/75 96 %       Review of Systems  Review of Systems   Constitutional: Positive for appetite change and fatigue. Negative for chills and fever. HENT: Negative for congestion, rhinorrhea and sore throat. Eyes: Negative for photophobia and visual disturbance. Respiratory: Negative for cough, chest tightness and shortness of breath. Cardiovascular: Negative for chest pain and palpitations.    Gastrointestinal: Positive for abdominal pain, diarrhea and nausea. Negative for vomiting. Genitourinary: Negative for dysuria and urgency. Neurological: Negative for dizziness. Psychiatric/Behavioral: Positive for decreased concentration. Home Medications   Prior to Admission medications    Medication Sig Start Date End Date Taking? Authorizing Provider   lisinopril (PRINIVIL, ZESTRIL) 20 mg tablet Take 20 mg by mouth daily. Yes Provider, Historical   levothyroxine (SYNTHROID) 112 mcg tablet TAKE 1 TABLET BY MOUTH DAILY BEFORE BREAKFAST 7/1/19  Yes Bobbi Rodas MD   LORazepam (ATIVAN) 2 mg tablet Take 1 Tab by mouth nightly. Max Daily Amount: 2 mg. Indications: chronic trouble sleeping 5/6/19   Bobbi Rodas MD   levocetirizine (XYZAL) 5 mg tablet Take 1 Tab by mouth daily. 5/6/19   Bobbi Rodas MD   triamcinolone acetonide (KENALOG) 0.1 % topical cream Apply  to affected area two (2) times a day. 1/24/19   Bobbi Rodas MD   ondansetron (ZOFRAN ODT) 4 mg disintegrating tablet  5/8/18   Provider, Historical   B.infantis-B.ani-B.long-B.bifi (PROBIOTIC 4X) 10-15 mg TbEC Take  by mouth. Provider, Historical   aspirin delayed-release 81 mg tablet Take  by mouth daily. Provider, Historical   acetaminophen (TYLENOL) 325 mg tablet Take  by mouth every four (4) hours as needed for Pain. Provider, Historical   omega-3s-dha-epa-fish oil-D3 360 mg-1,200 mg -1,000 unit cap Take  by mouth.     Provider, Historical       Allergies  Allergies   Allergen Reactions    Sulfa (Sulfonamide Antibiotics) Unknown (comments)       Past Medical History  Past Medical History:   Diagnosis Date    CAD (coronary artery disease)     Cancer ([facility] [address]     prostate    Hypercholesterolemia     Hypertension        Previous Hospitalization(s)  Past Surgical History:   Procedure Laterality Date    ENDOSCOPY, COLON, DIAGNOSTIC  2008    [place] -- 5 year fu    HX CATARACT REMOVAL      Left and Right    HX COLONOSCOPY      12/2016  [place]    HX CORONARY ARTERY BYPASS GRAFT  2001    HX HERNIA REPAIR  ,     HX PROSTATECTOMY      HX TONSILLECTOMY         Family History  Family History   Problem Relation Age of Onset    Cancer Sister         Brain    Stroke Brother     Cancer Brother         colon    Diabetes Brother     Cancer Mother         Colon    Heart Disease Father        Social History  Social History     Socioeconomic History    Marital status:      Spouse name: Not on file    Number of children: Not on file    Years of education: Not on file    Highest education level: Not on file   Occupational History    Not on file   Social Needs    Financial resource strain: Not on file    Food insecurity:     Worry: Not on file     Inability: Not on file    Transportation needs:     Medical: Not on file     Non-medical: Not on file   Tobacco Use    Smoking status: Former Smoker     Last attempt to quit: 1970     Years since quittin.1    Smokeless tobacco: Never Used   Substance and Sexual Activity    Alcohol use:  Yes     Alcohol/week: 5.8 standard drinks     Types: 7 Glasses of wine per week     Comment: red wine one glass/night    Drug use: No    Sexual activity: Not Currently   Lifestyle    Physical activity:     Days per week: Not on file     Minutes per session: Not on file    Stress: Not on file   Relationships    Social connections:     Talks on phone: Not on file     Gets together: Not on file     Attends [employer] service: Not on file     Active member of club or organization: Not on file     Attends meetings of clubs or organizations: Not on file     Relationship status: Not on file    Intimate partner violence:     Fear of current or ex partner: Not on file     Emotionally abused: Not on file     Physically abused: Not on file     Forced sexual activity: Not on file   Other Topics Concern    Not on file   Social History Narrative    Not on file       Alcohol history: Rarely  Smoking history: Non-smoker  Illicit drug history: Not at all  Living arrangement: patient lives alone. Ambulates: Independently     Vital Signs  Visit Vitals  /75   Pulse 78   Temp 97.5 °F (36.4 °C)   Resp 18   Ht 5' 4\" (1.626 m)   Wt 160 lb (72.6 kg)   SpO2 96%   BMI 27.46 kg/m²       Physical Exam  General: No acute distress. Alert. Cooperative. Head: Normocephalic. Atraumatic. Eyes:              Conjunctiva pink. Sclera white. PERRL. Ears:              Hearing grossly intact. Nose:             Septum midline. Mucosa pink. No drainage. Throat: Mucosa pink. Moist mucous membranes. No tonsillar exudates or erythema. Palate movement equal bilaterally. Neck: Supple. Normal ROM. No stiffness. Respiratory: CTAB. No w/r/r/c.   Cardiovascular: RRR. Aortic systolic ejection murmur. Pulses 2+ throughout. GI: + bowel sounds. LLQ and paraumbilical TTP. No rebound tenderness or guarding. Nondistended. Extremities: No LE edema. Distal pulses intact. Musculoskeletal: Full ROM in all extremities. Skin: Warm, dry. No rashes. Neuro: Oriented x3, difficulty with short term memory finding, pauses to respond to questions. Strength 5/5 in all extremities. Laboratory Data  Recent Results (from the past 8 hour(s))   CBC WITH AUTOMATED DIFF    Collection Time: 08/11/19  6:14 PM   Result Value Ref Range    WBC 10.1 4.1 - 11.1 K/uL    RBC 4.76 4.10 - 5.70 M/uL    HGB 13.7 12.1 - 17.0 g/dL    HCT 38.4 36.6 - 50.3 %    MCV 80.7 80.0 - 99.0 FL    MCH 28.8 26.0 - 34.0 PG    MCHC 35.7 30.0 - 36.5 g/dL    RDW 13.0 11.5 - 14.5 %    PLATELET 015 714 - 212 K/uL    MPV 8.7 (L) 8.9 - 12.9 FL    NRBC 0.0 0  WBC    ABSOLUTE NRBC 0.00 0.00 - 0.01 K/uL    NEUTROPHILS 78 (H) 32 - 75 %    LYMPHOCYTES 14 12 - 49 %    MONOCYTES 8 5 - 13 %    EOSINOPHILS 0 0 - 7 %    BASOPHILS 0 0 - 1 %    IMMATURE GRANULOCYTES 0 0.0 - 0.5 %    ABS. NEUTROPHILS 7.8 1.8 - 8.0 K/UL    ABS.  LYMPHOCYTES 1.4 0.8 - 3.5 K/UL ABS. MONOCYTES 0.8 0.0 - 1.0 K/UL    ABS. EOSINOPHILS 0.0 0.0 - 0.4 K/UL    ABS. BASOPHILS 0.0 0.0 - 0.1 K/UL    ABS. IMM. GRANS. 0.0 0.00 - 0.04 K/UL    DF AUTOMATED     METABOLIC PANEL, COMPREHENSIVE    Collection Time: 08/11/19  6:44 PM   Result Value Ref Range    Sodium 119 (LL) 136 - 145 mmol/L    Potassium 4.3 3.5 - 5.1 mmol/L    Chloride 88 (L) 97 - 108 mmol/L    CO2 22 21 - 32 mmol/L    Anion gap 9 5 - 15 mmol/L    Glucose 87 65 - 100 mg/dL    BUN 19 6 - 20 MG/DL    Creatinine 1.12 0.70 - 1.30 MG/DL    BUN/Creatinine ratio 17 12 - 20      GFR est AA >60 >60 ml/min/1.73m2    GFR est non-AA >60 >60 ml/min/1.73m2    Calcium 7.9 (L) 8.5 - 10.1 MG/DL    Bilirubin, total 0.5 0.2 - 1.0 MG/DL    ALT (SGPT) 15 12 - 78 U/L    AST (SGOT) 17 15 - 37 U/L    Alk. phosphatase 82 45 - 117 U/L    Protein, total 6.8 6.4 - 8.2 g/dL    Albumin 3.5 3.5 - 5.0 g/dL    Globulin 3.3 2.0 - 4.0 g/dL    A-G Ratio 1.1 1.1 - 2.2     LIPASE    Collection Time: 08/11/19  6:44 PM   Result Value Ref Range    Lipase 265 73 - 393 U/L   SAMPLES BEING HELD    Collection Time: 08/11/19  6:44 PM   Result Value Ref Range    SAMPLES BEING HELD LAV     COMMENT        Add-on orders for these samples will be processed based on acceptable specimen integrity and analyte stability, which may vary by analyte. Imaging  CXR Results  (Last 48 hours)    None        CT Results  (Last 48 hours)    None            Assessment and Plan     Criselda Robertson is a 80 y.o. male with known h/o diverticulitis, ischemic colitis, CAD s/p CABG 2001, hypothyroidism, HTN/HLD and prostate cancer s/p prostatectomy who presents to the ER complaining of 2 days of watery diarrhea, nausea and abdominal pain who is admitted for acute hyponatremia. Acute Hyponatremic Hypochloremia- POA Na 116. Likely secondary to loss from profuse diarrhea as sodium on 7/25 was 140. Received 0.5L NS bolus in ED.  Will aim to increase Na by 4-6mEq/L over next 6 hours but not more than 8mEq/L in 24 hrs. - Admitted to telemetry  - Vital signs per unit routine   - NS at 125ml/hr plus regular diet  - BMP Q4H, urine lytes  - Zofran PRN for nausea  - Seizure precautions, neuro checks and fall precaution  - Strict I/O's  - Daily CBC  - Will c/s Nephrology is hyponatremia does not improve on current treatment plan. Diarrhea- Pt unaware of PO intake prior to symptoms. Currently unknown etiology. Hx of diverticulitis and perforation from abscess. - Stool studies- WBC, ova & parasites, culture  - CT abd w/ cont- Due to h/o diverticular abscess and ischemic colitis and abdominal symptoms.  - Fluid resuscitation as above    Hypertension  - BP on admission 199/75. At this time, 164/73.  - Holding home Prinzide due to hyponatremia.   - Hydralazine 10mg PRN   - Will continue to monitor at this time and readjust as BP's trend. Hypothyroidism- Last TSH 7/25 and was 2.160.   - Continue home synthroid 112 mcg    CAD s/p CABG 2001- No echo on file. No anginal complaints at this time  - Monitor on telemetry  - Continue home ASA 81mg    Allergies  - Continue xyzal substitution per pharmacy protocol    Insomnia  - Holding home ativan 2mg for sleep      FEN/GI - Regular diet. NS at 125 mL/hr. Activity - Ambulate with assistance  DVT prophylaxis - Lovenox  GI prophylaxis - Not indicated at this time  Fall prophylaxis - Fall precautions ordered. Disposition - Admit to Telemetry. Plan to d/c to Home. Consulting PT/OT/CM  Code Status - Full, discussed with patient / caregivers.   Next of Naz 69 Name and Contact Daughter: Toma Crouch- Pt unable to provide number and not in chart    Patient Miguelangel Rivas will be discussed with Dr. Tommy Thompson.    8:25 PM, 08/11/19  Moshe Borrero MD  Family Medicine Resident       For Billing    Chief Complaint   Patient presents with    Diarrhea   [facility] Fatigue       Hospital Problems  Date Reviewed: 1/24/2019    None

## 2019-08-12 NOTE — PROGRESS NOTES
[facility]: MED RECONCILIATION    Comments/Recommendations:   Patient able to confirm name, , allergies, and preferred pharmacy  Patient reports compliance to medications    Medications added:     Lisinopril/Hydrochlorothiazide 20/25 - 1 tab PO daily  Dicyclomine 10mg PO Q8hrs prn  Align probiotic - 4mg PO daily    Medications removed:    Probiotic 4x - 1 tab PO daily as previously reported. - Taking Align  Lisinopril 20mg PO daily; Changed to Lisinopril/HCTZ. Levocetirizine 5mg PO daily - not taking  Fish oil - not taking  Kenalog cream 0.1% - not taking    Medications adjusted:    Lorazepam 1mg PO QHS; Change from 2mg PO QHS as previously reported. Information obtained from: Patient, Rx Query    Allergies: Sulfa (sulfonamide antibiotics) - could not recall reaction    Prior to Admission Medications:   Prior to Admission Medications   Prescriptions Last Dose Informant Patient Reported? Taking? Bifidobacterium Infantis (ALIGN) 4 mg cap 8/10/2019 at am  Yes Yes   Sig: Take 4 mg by mouth daily. LORazepam (ATIVAN) 2 mg tablet 8/10/2019 at pm  Yes Yes   Sig: Take 1 mg by mouth nightly. acetaminophen (TYLENOL) 325 mg tablet 8/10/2019 at am  Yes Yes   Sig: Take 325 mg by mouth every four (4) hours as needed for Pain. aspirin delayed-release 81 mg tablet 8/10/2019 at am  Yes Yes   Sig: Take 81 mg by mouth daily. dicyclomine (BENTYL) 10 mg capsule 8/10/2019 at am  Yes Yes   Sig: Take 10 mg by mouth every eight (8) hours as needed. levothyroxine (SYNTHROID) 112 mcg tablet 8/10/2019 at am  No Yes   Sig: TAKE 1 TABLET BY MOUTH DAILY BEFORE BREAKFAST   lisinopril-hydroCHLOROthiazide (PRINZIDE, ZESTORETIC) 20-25 mg per tablet 8/10/2019 at am  Yes Yes   Sig: Take 1 Tab by mouth daily. ondansetron (ZOFRAN ODT) 4 mg disintegrating tablet   Yes Yes   Sig: Take 4 mg by mouth every eight (8) hours as needed. Facility-Administered Medications: None             Israel Mckenzie. JOAQUÍN   [address] [facility] [address] [place] [address]

## 2019-08-12 NOTE — ED NOTES
Telephonic report given to Tyrese Duggan on [facility]. SBAR, MAR, Labs(Low Sodium), Cardiac Rhythm (Sinus Rhythm), Consults and Plan discussed.

## 2019-08-12 NOTE — PROGRESS NOTES
8- Reason for Admission:  Diarrhea                    RRAT Score:   8                  Plan for utilizing home health:     TBD                     Current Advanced Directive/Advance Care Plan: Full Code                         Transition of Care Plan:                    1. Met with patient to discuss PT/OT recommendations for home health  2. He lives alone and drives  3. Briefly discussed SNF for rehab and left message for daughter to contact me    I met with the patient to determine potential discharge needs. He lives alone in a [facility] house. He is independent with his ADL's, uses no assistive devices and drives. I spoke with him about the recommendation from PT for at [facility] and explained he would need to be home bound and not driving. He asked me to contact his daughter to discuss and I left a message for Prabha Sharma ([phone]). At this point, since the patient is totally alone as his daughter will be returning to work this week, he may benefit from a short rehab stay at a SNF (will need a 3-night stay). I will aiait a call-back from his daughter.     Care Management Interventions  PCP Verified by CM: Yes(Dr. Shaun Mathews navigator notified)  Transition of Care Consult (CM Consult): Home Health  Discharge Durable Medical Equipment: No  Physical Therapy Consult: Yes  Occupational Therapy Consult: Yes  Current Support Network: Lives Alone, Own Home  Confirm Follow Up Transport: Family  Discharge Location  Discharge Placement: (TBD)    MEDINA Barger, CM

## 2019-08-12 NOTE — PROGRESS NOTES
Shift Summary:    2227:  Patient arrived on unit. Patient's vitals taken and patient assessment completed. Patient AOx3. Oriented to person, situation, and time. Patient disoriented to place. Patient takes some time to answer some questions at times. As soon as patient arrived, patient stated he needed to use the urinal.  Patient unable to void. Bladder scan completed which showed 527 mL.      2235:  Spoke to Dr. Olga Rubio who put orders in for 1x straight cath. 2244:  Patient's IV fluids started. Patient able to void using urinal after standing up. Patient voided 400 mL. Patient stated that he felt like he needed to have a BM. Patient stated he did not need to. Patient states that he has not had any BMs since arriving to the hospital.      Patient requesting artificial tears and a nasal spray for his sinuses. 2344:  Patient calling out requesting medication for sleep. Spoke to Dr. Olga Rubio regarding patient request.  MD stated he would put orders in for Melatonin. 0028:  Patient complaining of 10/10 pain in R upper thigh. Patient reports pain started today. Patient states it is a dull aching pain, but is sharp and shooting when moving his leg. Patient also reporting neck pain and nausea. 0036:  Spoke to Dr. Olga Rubio regarding patient complaints of pain and nausea. MD stated he would put in orders for pain medication. 2336:  Patient given PRN Zofran for nausea and 400 mg Ibuprofen for pain. 4828:  Patient calling out complaining of sinus pain. Patient requesting nasal spray. Spoke to Dr. Ric Nelson regarding patient request.  MD stated he would put order in for nasal spray. 0600:  Patient's Q4 BMP drawn and sent to lab. End of Shift Report:    0740: Bedside and Verbal shift change report given to Sher Trinidad RN (oncoming nurse) by Fidelia Ashley RN (offgoing nurse).  Report included the following information SBAR, Kardex, ED Summary, MAR, Accordion, Recent Results and Cardiac Rhythm NSR/Sinus Tach.

## 2019-08-12 NOTE — PROGRESS NOTES
Bedside and Verbal shift change report given to [address] (oncoming nurse) by Carlita Wilkins RN (offgoing nurse). Report included the following information SBAR, Kardex, Recent Results and Cardiac Rhythm NSR, Sinus Tach. 1500 Daughter here, expressed concerns for pt living by himself, has not coped well with her mothers death. Concerned for father's well being, not eating and taking meds off schedule. Bedside and Verbal shift change report given to Peg RN (oncoming nurse) by Suzi Napier RN (offgoing nurse). Report included the following information SBAR, Kardex, Recent Results and Cardiac Rhythm NSR.

## 2019-08-12 NOTE — PROGRESS NOTES
SHIFT CHANGE:  1930 Bedside and Verbal shift change report given to Peg YEE (oncoming nurse) by Anthony Benítez (offgoing nurse). Report included the following information SBAR, Kardex, MAR and Recent Results. SHIFT SUMMARY:  2046  Left message with daughter's cell phone that patient was moved to 333 for closer observation. Patient has tried to get out of bed multiple times and was tugging on his IV. Seems a little forgetful, otherwise neurologic status WDL. 0577  Missed 2100 lab draw. Will obtain now. 0217  \"I'm at patient first\", patient needs reorientation regularly. Bed alarm remains on.  0549  BMP drawn. No issues to report. END OF SHIFT REPORT:  0730  Bedside and Verbal shift change report given to [facility] (oncoming nurse) by Yamile Arrington (offgoing nurse). Report included the following information SBAR, Kardex, MAR and Recent Results.

## 2019-08-13 VITALS
HEART RATE: 94 BPM | TEMPERATURE: 98.5 F | BODY MASS INDEX: 27.14 KG/M2 | HEIGHT: 64 IN | DIASTOLIC BLOOD PRESSURE: 65 MMHG | OXYGEN SATURATION: 96 % | SYSTOLIC BLOOD PRESSURE: 159 MMHG | WEIGHT: 158.95 LBS | RESPIRATION RATE: 12 BRPM

## 2019-08-13 LAB
ANION GAP SERPL CALC-SCNC: 5 MMOL/L (ref 5–15)
ANION GAP SERPL CALC-SCNC: 7 MMOL/L (ref 5–15)
ANION GAP SERPL CALC-SCNC: 8 MMOL/L (ref 5–15)
BASOPHILS # BLD: 0 K/UL (ref 0–0.1)
BASOPHILS NFR BLD: 0 % (ref 0–1)
BUN SERPL-MCNC: 10 MG/DL (ref 6–20)
BUN SERPL-MCNC: 11 MG/DL (ref 6–20)
BUN SERPL-MCNC: 9 MG/DL (ref 6–20)
BUN/CREAT SERPL: 11 (ref 12–20)
BUN/CREAT SERPL: 9 (ref 12–20)
BUN/CREAT SERPL: 9 (ref 12–20)
CALCIUM SERPL-MCNC: 7.8 MG/DL (ref 8.5–10.1)
CALCIUM SERPL-MCNC: 8 MG/DL (ref 8.5–10.1)
CALCIUM SERPL-MCNC: 8.1 MG/DL (ref 8.5–10.1)
CHLORIDE SERPL-SCNC: 100 MMOL/L (ref 97–108)
CHLORIDE SERPL-SCNC: 102 MMOL/L (ref 97–108)
CHLORIDE SERPL-SCNC: 99 MMOL/L (ref 97–108)
CO2 SERPL-SCNC: 22 MMOL/L (ref 21–32)
CO2 SERPL-SCNC: 24 MMOL/L (ref 21–32)
CO2 SERPL-SCNC: 25 MMOL/L (ref 21–32)
CREAT SERPL-MCNC: 0.97 MG/DL (ref 0.7–1.3)
CREAT SERPL-MCNC: 0.99 MG/DL (ref 0.7–1.3)
CREAT SERPL-MCNC: 1.06 MG/DL (ref 0.7–1.3)
DIFFERENTIAL METHOD BLD: ABNORMAL
EOSINOPHIL # BLD: 0 K/UL (ref 0–0.4)
EOSINOPHIL NFR BLD: 0 % (ref 0–7)
ERYTHROCYTE [DISTWIDTH] IN BLOOD BY AUTOMATED COUNT: 12.6 % (ref 11.5–14.5)
GLUCOSE SERPL-MCNC: 139 MG/DL (ref 65–100)
GLUCOSE SERPL-MCNC: 80 MG/DL (ref 65–100)
GLUCOSE SERPL-MCNC: 80 MG/DL (ref 65–100)
HCT VFR BLD AUTO: 38.1 % (ref 36.6–50.3)
HGB BLD-MCNC: 13.1 G/DL (ref 12.1–17)
IMM GRANULOCYTES # BLD AUTO: 0 K/UL (ref 0–0.04)
IMM GRANULOCYTES NFR BLD AUTO: 1 % (ref 0–0.5)
LYMPHOCYTES # BLD: 1.2 K/UL (ref 0.8–3.5)
LYMPHOCYTES NFR BLD: 15 % (ref 12–49)
MCH RBC QN AUTO: 29.1 PG (ref 26–34)
MCHC RBC AUTO-ENTMCNC: 34.4 G/DL (ref 30–36.5)
MCV RBC AUTO: 84.7 FL (ref 80–99)
MONOCYTES # BLD: 1.1 K/UL (ref 0–1)
MONOCYTES NFR BLD: 13 % (ref 5–13)
NEUTS SEG # BLD: 5.8 K/UL (ref 1.8–8)
NEUTS SEG NFR BLD: 71 % (ref 32–75)
NRBC # BLD: 0 K/UL (ref 0–0.01)
NRBC BLD-RTO: 0 PER 100 WBC
PLATELET # BLD AUTO: 215 K/UL (ref 150–400)
PMV BLD AUTO: 8.5 FL (ref 8.9–12.9)
POTASSIUM SERPL-SCNC: 4 MMOL/L (ref 3.5–5.1)
POTASSIUM SERPL-SCNC: 4.1 MMOL/L (ref 3.5–5.1)
POTASSIUM SERPL-SCNC: 4.5 MMOL/L (ref 3.5–5.1)
RBC # BLD AUTO: 4.5 M/UL (ref 4.1–5.7)
SODIUM SERPL-SCNC: 130 MMOL/L (ref 136–145)
SODIUM SERPL-SCNC: 130 MMOL/L (ref 136–145)
SODIUM SERPL-SCNC: 132 MMOL/L (ref 136–145)
WBC # BLD AUTO: 8.2 K/UL (ref 4.1–11.1)

## 2019-08-13 PROCEDURE — 36415 COLL VENOUS BLD VENIPUNCTURE: CPT

## 2019-08-13 PROCEDURE — 97530 THERAPEUTIC ACTIVITIES: CPT

## 2019-08-13 PROCEDURE — 74011250637 HC RX REV CODE- 250/637: Performed by: STUDENT IN AN ORGANIZED HEALTH CARE EDUCATION/TRAINING PROGRAM

## 2019-08-13 PROCEDURE — 74011250636 HC RX REV CODE- 250/636: Performed by: STUDENT IN AN ORGANIZED HEALTH CARE EDUCATION/TRAINING PROGRAM

## 2019-08-13 PROCEDURE — 85025 COMPLETE CBC W/AUTO DIFF WBC: CPT

## 2019-08-13 PROCEDURE — 94760 N-INVAS EAR/PLS OXIMETRY 1: CPT

## 2019-08-13 PROCEDURE — 80048 BASIC METABOLIC PNL TOTAL CA: CPT

## 2019-08-13 PROCEDURE — 97116 GAIT TRAINING THERAPY: CPT

## 2019-08-13 PROCEDURE — 97535 SELF CARE MNGMENT TRAINING: CPT

## 2019-08-13 RX ORDER — LISINOPRIL 20 MG/1
20 TABLET ORAL DAILY
Qty: 30 TAB | Refills: 0 | Status: SHIPPED | OUTPATIENT
Start: 2019-08-13 | End: 2019-11-20 | Stop reason: SDUPTHER

## 2019-08-13 RX ADMIN — ONDANSETRON 4 MG: 2 INJECTION INTRAMUSCULAR; INTRAVENOUS at 08:49

## 2019-08-13 RX ADMIN — LEVOTHYROXINE SODIUM 112 MCG: 112 TABLET ORAL at 05:50

## 2019-08-13 RX ADMIN — FLUTICASONE PROPIONATE 2 SPRAY: 50 SPRAY, METERED NASAL at 09:39

## 2019-08-13 RX ADMIN — Medication 10 ML: at 05:50

## 2019-08-13 RX ADMIN — LISINOPRIL 20 MG: 20 TABLET ORAL at 09:39

## 2019-08-13 RX ADMIN — LORATADINE 10 MG: 10 TABLET ORAL at 09:39

## 2019-08-13 RX ADMIN — SODIUM CHLORIDE 150 ML/HR: 900 INJECTION, SOLUTION INTRAVENOUS at 08:44

## 2019-08-13 RX ADMIN — SODIUM CHLORIDE 150 ML/HR: 900 INJECTION, SOLUTION INTRAVENOUS at 01:12

## 2019-08-13 RX ADMIN — ASPIRIN 81 MG: 81 TABLET ORAL at 09:39

## 2019-08-13 NOTE — PROGRESS NOTES
Problem: Mobility Impaired (Adult and Pediatric)  Goal: *Acute Goals and Plan of Care (Insert Text)  Description  FUNCTIONAL STATUS PRIOR TO ADMISSION: Patient was independent and active without use of DME.    HOME SUPPORT PRIOR TO ADMISSION: The patient lived alone with no local support. Physical Therapy Goals  Initiated 8/12/2019  1. Patient will move from supine to sit and sit to supine  in bed with modified independence within 7 day(s). 2.  Patient will transfer from bed to chair and chair to bed with modified independence using the least restrictive device within 7 day(s). 3.  Patient will perform sit to stand with modified independence within 7 day(s). 4.  Patient will ambulate with modified independence for 150 feet with the least restrictive device within 7 day(s). 5.  Patient will ascend/descend 4 stairs with 2 handrail(s) with modified independence within 7 day(s). Note:   PHYSICAL THERAPY TREATMENT  Patient: Russell Torrez (72 y.o. male)  Date: 8/13/2019  Diagnosis: Acute hyponatremia [E87.1] Acute hyponatremia       Precautions: Fall  Chart, physical therapy assessment, plan of care and goals were reviewed. ASSESSMENT  Based on the objective data described below, Pt with improved mobility and tolerance for PT. Pt ambulating increased distance. Pt may need an assistive device for going home. Current Level of Function Impacting Discharge (mobility/balance): Pt is min assist ambulating hand held. Pt tends to reach for hallway rail. PLAN :  Patient continues to benefit from skilled intervention to address the above impairments. Continue treatment per established plan of care. to address goals. Recommendation for discharge: (in order for the patient to meet his/her long term goals) SNF vs home health with supervision.       This discharge recommendation:  Has not yet been discussed the attending provider and/or case management    Equipment recommendations for successful discharge (if) home: TBD        SUBJECTIVE:       OBJECTIVE DATA SUMMARY:   Critical Behavior:  Neurologic State: Alert  Orientation Level: Oriented to person, Disoriented to time, Oriented to place, Disoriented to situation  Cognition: Follows commands, Memory loss, Poor safety awareness, Impulsive  Safety/Judgement: Awareness of environment  Functional Mobility Training:  Bed Mobility:  Rolling: Contact guard assistance  Supine to Sit: Contact guard assistance  Sit to Supine: Contact guard assistance  Scooting: Supervision        Transfers:  Sit to Stand: Contact guard assistance  Stand to Sit: Contact guard assistance        Bed to Chair: Minimum assistance                    Balance:  Sitting: Intact  Standing: With support  Standing - Static: Fair  Standing - Dynamic : Fair  Ambulation/Gait Training:  Distance (ft): 90 Feet (ft)  Assistive Device: Gait belt  Ambulation - Level of Assistance: Minimal assistance              Activity Tolerance:   Good  Please refer to the flowsheet for vital signs taken during this treatment.     After treatment patient left in no apparent distress:   Sitting in chair    COMMUNICATION/COLLABORATION:   The patients plan of care was discussed with: Physical Therapist    Sinai Crane PTA   Time Calculation: 23 mins

## 2019-08-13 NOTE — DISCHARGE INSTRUCTIONS
HOME DISCHARGE INSTRUCTIONS    Stanislaw Rabago / [id number] : 1930    Admission date: 2019 Discharge date: 2019 3:09 PM     Please bring this form with you to show your care provider at your follow-up appointment. Primary care provider:  Tesfaye Castañeda MD    Discharging provider:  Chandrakant Hobbs DO  - Family Medicine Resident  Maritza Will DO - Family Medicine Attending      You have been admitted to the hospital with the following diagnoses:    ACUTE DIAGNOSES:  Acute hyponatremia [E87.1]  . . . . . . . . . . . . . . . . . . . . . . . . . . . . . . . . . . . . . . . . . . . . . . . . . . . . . . . . . . . . . . . . . . . . . . . Shikha Lobo FOLLOW-UP CARE RECOMMENDATIONS:    Please follow up with your PCP      MEDICATION CHANGES:    STOP Taking Prinzide  START Taking Lisinopril 20mg daily     Appointments  Follow-up Information     Follow up With Specialties Details Why Contact Info    Tesfaye Castañeda MD [facility] Go on 2019 Hospital followup at 1:15pm [address] [facility] [address]  [phone]       [address]  for home PT/OT [address] [facility] [address]  [phone]             Follow-up tests needed: BMP    Pending test results: At the time of your discharge the following test results are still pending: none. Please make sure you review these results with your outpatient follow-up provider(s). Specific symptoms to watch for: chest pain, shortness of breath, fever, chills, nausea, vomiting, diarrhea, change in mentation, falling, weakness, bleeding. DIET/what to eat:  Regular Diet    ACTIVITY:  Activity as tolerated    Wound care: none    Equipment needed:  none    What to do if new or unexpected symptoms occur? If you experience any of the above symptoms (or should other concerns or questions arise after discharge) please call your primary care physician.  Return to the emergency room if you cannot get hold of your doctor. · It is very important that you keep your follow-up appointment(s). · Please bring discharge papers, medication list (and/or medication bottles) to your follow-up appointments for review by your outpatient provider(s). · Please check the list of medications and be sure it includes every medication (even non-prescription medications) that your provider wants you to take. · It is important that you take the medication exactly as they are prescribed. · Keep your medication in the bottles provided by the pharmacist and keep a list of the medication names, dosages, and times to be taken in your wallet. · Do not take other medications without consulting your doctor. · If you have any questions about your medications or other instructions, please talk to your nurse or care provider before you leave the hospital.     Information obtained by:     I understand that if any problems occur once I am at home I am to contact my physician. These instructions were explained to me and I had the opportunity to ask questions. I understand and acknowledge receipt of the instructions indicated above.                                                                                                                                                Physician's or R.N.'s Signature                                                                  Date/Time                                                                                                                                              Patient or Representative Signature                                                          Date/Time

## 2019-08-13 NOTE — PROGRESS NOTES
Problem: Mobility Impaired (Adult and Pediatric) Goal: *Acute Goals and Plan of Care (Insert Text) Description FUNCTIONAL STATUS PRIOR TO ADMISSION: Patient was independent and active without use of DME. 
 
HOME SUPPORT PRIOR TO ADMISSION: The patient lived alone with no local support. Physical Therapy Goals Initiated 8/12/2019 1. Patient will move from supine to sit and sit to supine  in bed with modified independence within 7 day(s). 2.  Patient will transfer from bed to chair and chair to bed with modified independence using the least restrictive device within 7 day(s). 3.  Patient will perform sit to stand with modified independence within 7 day(s). 4.  Patient will ambulate with modified independence for 150 feet with the least restrictive device within 7 day(s). 5.  Patient will ascend/descend 4 stairs with 2 handrail(s) with modified independence within 7 day(s). 8/13/2019 1450 by Rony Teague PTA Note: PHYSICAL THERAPY TREATMENT Patient: Enoc Sumner (27 y.o. male) Date: 8/13/2019 Diagnosis: Acute hyponatremia [E87.1] Acute hyponatremia Precautions: Fall Chart, physical therapy assessment, plan of care and goals were reviewed. ASSESSMENT Based on the objective data described below, Pt progressing with mobility. Pt CGA for afternoon PT. Current Level of Function Impacting Discharge (mobility/balance): Pt CGA supine to sit to stand. Pt ambulated 100ft with RW CGA. PLAN : 
Patient continues to benefit from skilled intervention to address the above impairments. Continue treatment per established plan of care. to address goals. Recommendation for discharge: (in order for the patient to meet his/her long term goals) Physical therapy at least 2 days/week in the home This discharge recommendation: 
Has been made in collaboration with the attending provider and/or case management Equipment recommendations for successful discharge (if) home: rolling walker SUBJECTIVE:  
 
 
OBJECTIVE DATA SUMMARY:  
Critical Behavior: 
Neurologic State: Alert Orientation Level: Oriented to person, Disoriented to time, Oriented to place, Disoriented to situation Cognition: Follows commands, Memory loss, Poor safety awareness, Impulsive Safety/Judgement: Awareness of environment Functional Mobility Training: 
Bed Mobility: 
Rolling: Contact guard assistance Supine to Sit: Contact guard assistance Transfers: 
Sit to Stand: Contact guard assistance Stand to Sit: Contact guard assistance Balance: 
Sitting: Intact Standing: Intact; With support Standing - Static: Fair;Good Standing - Dynamic : Fair Ambulation/Gait Training: 
Distance (ft): 100 Feet (ft) Assistive Device: Gait belt Ambulation - Level of Assistance: Contact guard assistance Activity Tolerance:  
Good Please refer to the flowsheet for vital signs taken during this treatment. After treatment patient left in no apparent distress:  
Sitting in chair COMMUNICATION/COLLABORATION:  
The patients plan of care was discussed with: Physical Therapist 
 
Kelechi Salcido PTA Time Calculation: 24 mins 8/13/2019 1250 by Moshe Williamson PTA Note: PHYSICAL THERAPY TREATMENT Patient: Elisha Zambrano (57 y.o. male) Date: 8/13/2019 Diagnosis: Acute hyponatremia [E87.1] Acute hyponatremia Precautions: Fall Chart, physical therapy assessment, plan of care and goals were reviewed. ASSESSMENT Based on the objective data described below, Pt with improved mobility and tolerance for PT. Pt ambulating increased distance. Pt may need an assistive device for going home. Current Level of Function Impacting Discharge (mobility/balance): Pt is min assist ambulating hand held. Pt tends to reach for hallway rail.  
 
 
    
 
PLAN : 
 Patient continues to benefit from skilled intervention to address the above impairments. Continue treatment per established plan of care. to address goals. Recommendation for discharge: (in order for the patient to meet his/her long term goals) Physical therapy at least 2 days/week in the home AND ensure assist and/or supervision for safety with This discharge recommendation: 
Has not yet been discussed the attending provider and/or case management Equipment recommendations for successful discharge (if) home: TBD SUBJECTIVE:  
 
 
OBJECTIVE DATA SUMMARY:  
Critical Behavior: 
Neurologic State: Alert Orientation Level: Oriented to person, Disoriented to time, Oriented to place, Disoriented to situation Cognition: Follows commands, Memory loss, Poor safety awareness, Impulsive Safety/Judgement: Awareness of environment Functional Mobility Training: 
Bed Mobility: 
Rolling: Contact guard assistance Supine to Sit: Contact guard assistance Sit to Supine: Contact guard assistance Scooting: Supervision Transfers: 
Sit to Stand: Contact guard assistance Stand to Sit: Contact guard assistance Bed to Chair: Minimum assistance Balance: 
Sitting: Intact Standing: With support Standing - Static: Fair Standing - Dynamic : Fair Ambulation/Gait Training: 
Distance (ft): 90 Feet (ft) Assistive Device: Gait belt Ambulation - Level of Assistance: Minimal assistance Activity Tolerance:  
Good Please refer to the flowsheet for vital signs taken during this treatment. After treatment patient left in no apparent distress:  
Sitting in chair COMMUNICATION/COLLABORATION:  
The patients plan of care was discussed with: Physical Therapist 
 
Christiano Olvera PTA Time Calculation: 23 mins

## 2019-08-13 NOTE — PROGRESS NOTES
Bedside shift change report given to [facility] (oncoming nurse) by [facility] (offgoing nurse). Report included the following information SBAR, Kardex, Intake/Output, Recent Results and Cardiac Rhythm NSR    1600 Pt removed his telemetry monitoring and refused to put back in.     1730 Pt had removed his IV. No bleeding noted.

## 2019-08-13 NOTE — PROGRESS NOTES
*ATTENTION:  This note has been created by a medical student for educational purposes only. Please do not refer to the content of this note for clinical decision-making, billing, or other purposes. Please see attending physicians note to obtain clinical information on this patient. *           [facility] FAMILY MEDICINE RESIDENCY PROGRAM   Daily Progress Note    Date: 8/13/2019    Assessment/Plan:   Maya Hess is a 80 y.o. male with PMHx of diverticulitis, ischemic colitis, CAD s/p CABG in 2001, hypothyroidism, HTN/HLD, and prostate cancer s/p protestectomy in 1995 who is hospitalized for acute hyponatremia in the setting of 48 hrs of nonbloody diarrhea. 24 Hour Events: No acute events overnight. Acute Hyponatremia: Improving. Na 130. Baseline 140. In setting of 48 hours of nonbloody diarrhea. Has had similar episode in past with Na at 124 at [facility] in 2017.   -Continue maintenance . 9% NaCL at 150ml/hr. Replete fluids with goal of increasing sodium by 4-6 mEq/L over 6 hours, but not more than 8 mEq/L over 24 hours. .    -Urine lytes showing urine Na 44, urine Cr <13, urine Os 138. Serum osm 260. FeNa 2.8%. -BMPs q4hr  -Monitor Is/Os  -On telemetry    Diarrhea/Nausea: No diarrhea since admission. -CT abd neg  -Continue zofran 4mg q4h prn. Last dose 8am    Incidental Pulmonary Nodule:   -CT chest 8/12 showing no other nodules. HTN: 172/74 overnight  -Hydralazine 10mg prn. Last dose 8/12 11am.  -Continue home lisinopril    Depression: Tearful discussing wife's passing 3 years prior. Says he's discussed with providers before and doesn't want to take pills.   -Follow up at transition of care    Hypothyroid. Most recent TSH was 2.16 on 7/25/19  - Continue home synthroid 112 mcg    CAD s/p CABG 2001  -Monitor w/ telemetry  Continue home baby aspirin    Insomnia:   -Holding home ativan 2mg    FEN/GI - NaCL . 9% at 150 mL/hr. 500mL bolus x2 since admission.    Activity - No restrictions  DVT prophylaxis - Enoxaparin 40mg ing q24h  GI prophylaxis -  No therapies  Disposition - Plan to d/c to home pending PT/OT eval and resolution of hyponatremia. CODE STATUS:  Full. (per Dr. Pedro Tellez note on 8/12)    Patient was discussed with Dr. Chan Duffy. Ash Ordaz  M4, Acting Intern  8/13/2019 8:01 AM         CC: Hyponatremia    Subjective: Patient claims to be doing better than yesterday. He complains of sadness and missing his wife who passed 3 years ago. He became tearful when talking about his wife.        Inpatient Medications  Current Facility-Administered Medications   Medication Dose Route Frequency    ibuprofen (MOTRIN) tablet 400 mg  400 mg Oral Q4H PRN    acetaminophen (TYLENOL) tablet 650 mg  650 mg Oral Q6H PRN    fluticasone propionate (FLONASE) 50 mcg/actuation nasal spray 2 Spray  2 Spray Both Nostrils DAILY    lisinopril (PRINIVIL, ZESTRIL) tablet 20 mg  20 mg Oral DAILY    0.9% sodium chloride infusion  150 mL/hr IntraVENous CONTINUOUS    sodium chloride (NS) flush 5-40 mL  5-40 mL IntraVENous Q8H    sodium chloride (NS) flush 5-40 mL  5-40 mL IntraVENous PRN    ondansetron (ZOFRAN) injection 4 mg  4 mg IntraVENous Q4H PRN    enoxaparin (LOVENOX) injection 40 mg  40 mg SubCUTAneous Q24H    levothyroxine (SYNTHROID) tablet 112 mcg  112 mcg Oral 6am    aspirin delayed-release tablet 81 mg  81 mg Oral DAILY    melatonin tablet 3 mg  3 mg Oral QHS    loratadine (CLARITIN) tablet 10 mg  10 mg Oral DAILY    artificial tears (dextran 70-hypromellose) (NATURAL BALANCE) 0.1-0.3 % ophthalmic solution 2 Drop  2 Drop Both Eyes PRN         Allergies  Allergies   Allergen Reactions    Sulfa (Sulfonamide Antibiotics) Unknown (comments)         Objective  Vitals:  Patient Vitals for the past 8 hrs:   Temp Pulse Resp BP SpO2   08/13/19 0835 98 °F (36.7 °C) 79 19 165/62 96 %   08/13/19 0700  77      08/13/19 0412 99 °F (37.2 °C) 81 13 154/60 98 %         I/O:    Intake/Output Summary (Last 24 hours) at 8/13/2019 0855  Last data filed at 8/13/2019 0283  Gross per 24 hour   Intake 4735.42 ml   Output 3875 ml   Net 860.42 ml     Last shift:    No intake/output data recorded. Last 3 shifts:    08/11 1901 - 08/13 0700  In: 7680.4 [P.O.:1260; I.V.:6420.4]  Out: 5825 [Urine:5825]    Physical Exam:  General: No acute distress. Alert. Cooperative. HEENT: Normocephalic. Atraumatic. Leeann Congo Conjunctiva pink. Sclera white. PERRL. MMM   Respiratory: CTAB. No w/r/r/c.   Cardiovascular: RRR. Normal P5,P7. 2/6 holosystolic murmur. GI: Hyperactive  bowel sounds. Nontender throughout all quadrants and suprapubic area. No rebound tenderness or guarding. Nondistended   Extremities: No edema. No tenderness. Laboratory Data  Recent Results (from the past 12 hour(s))   METABOLIC PANEL, BASIC    Collection Time: 08/13/19  1:04 AM   Result Value Ref Range    Sodium 130 (L) 136 - 145 mmol/L    Potassium 4.5 3.5 - 5.1 mmol/L    Chloride 99 97 - 108 mmol/L    CO2 24 21 - 32 mmol/L    Anion gap 7 5 - 15 mmol/L    Glucose 80 65 - 100 mg/dL    BUN 10 6 - 20 MG/DL    Creatinine 1.06 0.70 - 1.30 MG/DL    BUN/Creatinine ratio 9 (L) 12 - 20      GFR est AA >60 >60 ml/min/1.73m2    GFR est non-AA >60 >60 ml/min/1.73m2    Calcium 7.8 (L) 8.5 - 10.1 MG/DL   CBC WITH AUTOMATED DIFF    Collection Time: 08/13/19  1:04 AM   Result Value Ref Range    WBC 8.2 4.1 - 11.1 K/uL    RBC 4.50 4. 10 - 5.70 M/uL    HGB 13.1 12.1 - 17.0 g/dL    HCT 38.1 36.6 - 50.3 %    MCV 84.7 80.0 - 99.0 FL    MCH 29.1 26.0 - 34.0 PG    MCHC 34.4 30.0 - 36.5 g/dL    RDW 12.6 11.5 - 14.5 %    PLATELET 186 835 - 116 K/uL    MPV 8.5 (L) 8.9 - 12.9 FL    NRBC 0.0 0  WBC    ABSOLUTE NRBC 0.00 0.00 - 0.01 K/uL    NEUTROPHILS 71 32 - 75 %    LYMPHOCYTES 15 12 - 49 %    MONOCYTES 13 5 - 13 %    EOSINOPHILS 0 0 - 7 %    BASOPHILS 0 0 - 1 %    IMMATURE GRANULOCYTES 1 (H) 0.0 - 0.5 %    ABS. NEUTROPHILS 5.8 1.8 - 8.0 K/UL    ABS. LYMPHOCYTES 1.2 0.8 - 3.5 K/UL    ABS. MONOCYTES 1.1 (H) 0.0 - 1.0 K/UL    ABS. EOSINOPHILS 0.0 0.0 - 0.4 K/UL    ABS. BASOPHILS 0.0 0.0 - 0.1 K/UL    ABS. IMM. GRANS. 0.0 0.00 - 0.04 K/UL    DF AUTOMATED     METABOLIC PANEL, BASIC    Collection Time: 08/13/19  5:49 AM   Result Value Ref Range    Sodium 130 (L) 136 - 145 mmol/L    Potassium 4.0 3.5 - 5.1 mmol/L    Chloride 100 97 - 108 mmol/L    CO2 22 21 - 32 mmol/L    Anion gap 8 5 - 15 mmol/L    Glucose 80 65 - 100 mg/dL    BUN 9 6 - 20 MG/DL    Creatinine 0.97 0.70 - 1.30 MG/DL    BUN/Creatinine ratio 9 (L) 12 - 20      GFR est AA >60 >60 ml/min/1.73m2    GFR est non-AA >60 >60 ml/min/1.73m2    Calcium 8.1 (L) 8.5 - 10.1 MG/DL     Labs pertinent for Na 130. Imaging  CT Abd 8/12: No acute abd findings. 4.5mm right middle pulm nodule.      Hospital Problems:  Hospital Problems  Date Reviewed: 1/24/2019          Codes Class Noted POA    * (Principal) Acute hyponatremia ICD-10-CM: E87.1  ICD-9-CM: 276.1  8/11/2019 Unknown        Primary hypertension ICD-10-CM: I10  ICD-9-CM: 401.9  5/9/2014 Yes        Acquired hypothyroidism ICD-10-CM: E03.9  ICD-9-CM: 244.9  6/16/2010 Yes        Coronary atherosclerosis of native coronary artery ICD-10-CM: I25.10  ICD-9-CM: 414.01  6/16/2010 Yes

## 2019-08-13 NOTE — PROGRESS NOTES
5:45 PM  I met with the patient and his daughter Ms. Jose Coy. We went over the discharge plan. 1. [facility]  2. Ms. Jose Coy will be staying with her father at his address  3. RW delivered to pt's room  4. List of private duty providers given to Ms. Raphael  5. Patient and Ms. Jose Coy verbalized understanding. HEATHER Cárdenas    5:14 PM  RW ordered through [employer]. It will be delivered to the pt's room. Referral sent in [employer]. HEATHER Cárdenas      I met with the patient and we discussed going to a SNF. He was in agreement for a referral to be sent to [facility]. I also called the pt's son Blossom Thomas as he and the pt's daughter have called several times. I asked the patient for permission before I called him. Blossom Chirinosaury stated that he was on board with the SNF plan. Physician spoke to me and said that the patient has changed his mind and wants to go home. I went in to speak with the patient and he said, \"I want to go home\". I explained that therapy recommended home with  supervision or a SNF. He said, \"I'll be fine\". I asked about transportation and he stated that he was going to call his son-in-law. HH orders sent to [facility] home health in [employer]. Choice letter is signed and on the pt's chart. Physic an to call Blossom William and inform him of the change in plans.  HEATHER Cárdenas

## 2019-08-13 NOTE — DISCHARGE SUMMARY
Suzy Cortes [address]  Office [phone] Fax [phone] Discharge / Transfer / Off-Service Note Name: Kelly Meadows MRN: [id number]  Sex: Male YOB: 1930  Age: 80 y.o. PCP: Andi Melgoza MD  
 
Date of admission: 8/11/2019 Date of discharge/transfer: 8/13/2019 Attending physician at admission: Elvia Roberts,  Attending physician at discharge/transfer: Evlia Roberts DO Resident physician at discharge/transfer: Dominic Penaloza DO Consultants during hospitalization IP CONSULT TO [facility] Admission diagnoses Acute hyponatremia [E87.1] Recommended follow-up after discharge 1. PCP Things to follow up on with PCP:  
Low Na, diarrhea, HTN Medication Changes: 1. NEW MEDICATIONS: Lisinopril 20mg daily 2. DISCONTINUED: Prinzide History of Present Illness As per admitting provider, Dr. Mireille Uriarte of Present Illness Kelly Meadows is a 80 y.o. male with known h/o diverticulitis, ischemic colitis, CAD s/p CABG 2001, hypothyroidism, HTN/HLD and prostate cancer (s/p radical prostatectomy ~1995) who presents via EMS to the ER complaining of 2 days of watery diarrhea, nausea and abdominal pain. He states that he woke up at midnight 2 days ago with diarrhea and has has innumerable episodes since then. His abdominal pain started suprapubic but is now paraumbilical and is more sore than painful at this time. He has decreased PO intake due to nausea and does not know what he ate before his symptoms began. He denies any chest pain, SoB, cough, vomiting, hematchezia, melena, PPI or Abx use. 
  
In the ED: 
Vitals: Temp 97.5     /75    HR 78     RR 18   SatO2 96% on RA Labs: Na 119, Cl 88, Cr 1.12 Imaging: None Treatment: 0.5L NS, 10mg hydralazine, zofran 
  
EKG: Pending\" Hospital course Kelly Meadows was admitted into the [facility] Medicine Service from 8/11/2019 to 8/13/19 for Acute hyponatremia [E87.1]. During the course of this admission, the following conditions were addressed/managed; Acute Hypovolemic hypotonic hyponatremia- (Resolved) POA Na 116 (BL Na 140). Likely 2/2  profuse diarrhea prior to admission. Urine lytes- Ur Na 44, Ur Cr <13, Ur Os 138, Serum om 260, FeNa 2.8%. Na improved to 132 at discharge after fluids during admission. 
- Follow up BMP with PCP 
  
Diarrhea- (Resolved) Unkown etiology. Hx of diverticulitis and perforation from abscess. CT abdomen no acute process. No BM during admission. Fluid resuscitation provided.  
  
Hypertension: Discontinue home Prinzide due to hyponatremia.  
- Continue Lisinopril 20mg daily - Follow up with PCP and titrate Lisinopril up as needed.  
  
Hypothyroidism- Last TSH 7/25 and was 2.160.  
- Continue home synthroid 112 mcg 
  
CAD s/p CABG 2001- No echo on file. No anginal complaints at this time 
- Continue home ASA 81mg 
  
Allergies - Continue xyzal and flonase for sinus congestion 
  
Insomnia 
- Continue home ativan 2mg and Melatonin 3mg QHS Physical exam at discharge: 
 
Vitals Reviewed. Patient Vitals for the past 12 hrs: 
 Temp Pulse Resp BP SpO2  
08/13/19 1550 98.5 °F (36.9 °C) 94 12 159/65 96 % 08/13/19 1147 98.2 °F (36.8 °C) 76 17 150/54 97 % 08/13/19 0835 98 °F (36.7 °C) 79 19 165/62 96 % 08/13/19 0700  77     
08/13/19 0412 99 °F (37.2 °C) 81 13 154/60 98 % General Oriented to person, place, and time and well-developed. Appears well-nourished, no distress. Not diaphoretic. HENT Head Normocephalic and atraumatic. Eyes Conjunctivae are normal, no discharge. No scleral icterus. Neck No thyromegaly present. No cervical adenopathy. Cardio Normal rate, regular rhythm. Exam reveals no gallop and no friction rub. Aortic systolic ejection murmur present. No chest wall tenderness. Pulmonary Effort normal and breath sounds normal. No respiratory distress. No wheezes, no rales. Abdominal Soft. Bowel sounds normal. No distension. No tenderness. Extremities No edema of lower extremities. No tenderness. Distal pulses intact. Neurological Alert and oriented to person, place, and time. Dermatology Skin is warm and dry. No rash noted. No erythema or pallor. Psychiatric Affect and judgment normal.   
 
 
Condition at discharge: Stable. Labs Recent Labs 08/13/19 
0104 08/12/19 
0708 08/11/19 
1814 WBC 8.2 9.5 10.1 HGB 13.1 13.4 13.7 HCT 38.1 38.0 38.4  229 287 Recent Labs 08/13/19 
1423 08/13/19 
0549 08/13/19 
0104 * 130* 130*  
K 4.1 4.0 4.5  
 100 99 CO2 25 22 24 BUN 11 9 10 CREA 0.99 0.97 1.06  
* 80 80  
CA 8.0* 8.1* 7.8* Recent Labs 08/11/19 
1844 SGOT 17 ALT 15  
AP 82 TBILI 0.5 TP 6.8 ALB 3.5 GLOB 3.3 LPSE 265 No results for input(s): PH, PCO2, PO2, TNIPOC, TROIQ, INR, PTP, APTT, FE, TIBC, PSAT, FERR, GLUCPOC in the last 72 hours. No lab exists for component: GLPOC, INREXT, INREXT Cultures · None Procedures / Diagnostic Studies · CT abd/pelvis: no acute process · CT chest: 4mm subpleural nodule in the R middle lobe. Imaging No results found for this or any previous visit. No results found for this or any previous visit. Results from Hospital Encounter encounter on 08/11/19 CT CHEST W CONT Narrative INDICATION: pulmonary nodule COMPARISON: CT abdomen 8/11/2019 TECHNIQUE:  Following the uneventful intravenous administration of 100 cc Isovue-300, 5 mm axial images were obtained through the chest. Coronal and 
sagittal reconstructions were generated. CT dose reduction was achieved through 
use of a standardized protocol tailored for this examination and automatic 
exposure control for dose modulation. FINDINGS: 
 
The 4 mm subpleural nodule in the right middle lobe is again noted. There are no other nodules identified. The lungs are otherwise fully expanded and clear. No hilar or mediastinal adenopathy. No pleural effusion. Upper abdomen 
unremarkable. Impression IMPRESSION: Nonspecific noncalcified 4 mm nodule subpleural in the right middle 
lobe. Otherwise negative. No procedure found. Chronic diagnoses Problem List as of 8/13/2019 Date Reviewed: 1/24/2019 Codes Class Noted - Resolved * (Principal) Acute hyponatremia ICD-10-CM: E87.1 ICD-9-CM: 276.1  8/11/2019 - Present Episode of recurrent major depressive disorder ([facility] ICD-10-CM: F33.9 ICD-9-CM: 296.30  5/1/2018 - Present Stress incontinence of urine ICD-10-CM: N39.3 ICD-9-CM: QBO4090  1/30/2018 - Present Diverticulitis of large intestine with perforation and abscess without bleeding ICD-10-CM: K57.20 ICD-9-CM: 562.11  12/5/2017 - Present Ischemic colitis ([facility] ICD-10-CM: K55.9 ICD-9-CM: 557.9  7/12/2017 - Present Advance care planning ICD-10-CM: Z71.89 ICD-9-CM: V65.49  1/18/2017 - Present Overview Signed 1/18/2017  2:30 PM by Kaylyn Marroquin MD  
  Has a Lw Insomnia ICD-10-CM: G47.00 ICD-9-CM: 780.52  10/15/2015 - Present Primary hypertension ICD-10-CM: I10 
ICD-9-CM: 401.9  5/9/2014 - Present Cancer [facility]) ICD-10-CM: C80.1 ICD-9-CM: 199.1  Unknown - Present Overview Signed 6/16/2010 11:17 AM by Elsie Sanders MD  
  prostate Acquired hypothyroidism ICD-10-CM: E03.9 ICD-9-CM: 244.9  6/16/2010 - Present Mixed hyperlipidemia ICD-10-CM: E78.2 ICD-9-CM: 272.2  6/16/2010 - Present Coronary atherosclerosis of native coronary artery ICD-10-CM: I25.10 ICD-9-CM: 414.01  6/16/2010 - Present Discharge/Transfer Medications Current Discharge Medication List  
  
CONTINUE these medications which have CHANGED Details  
lisinopril (PRINIVIL, ZESTRIL) 20 mg tablet Take 1 Tab by mouth daily. Qty: 30 Tab, Refills: 0 CONTINUE these medications which have NOT CHANGED Details Bifidobacterium Infantis (ALIGN) 4 mg cap Take 4 mg by mouth daily. LORazepam (ATIVAN) 2 mg tablet Take 1 mg by mouth nightly. dicyclomine (BENTYL) 10 mg capsule Take 10 mg by mouth every eight (8) hours as needed. levothyroxine (SYNTHROID) 112 mcg tablet TAKE 1 TABLET BY MOUTH DAILY BEFORE BREAKFAST Qty: 90 Tab, Refills: 0 Associated Diagnoses: Acquired hypothyroidism  
  
ondansetron (ZOFRAN ODT) 4 mg disintegrating tablet Take 4 mg by mouth every eight (8) hours as needed. aspirin delayed-release 81 mg tablet Take 81 mg by mouth daily. acetaminophen (TYLENOL) 325 mg tablet Take 325 mg by mouth every four (4) hours as needed for Pain. STOP taking these medications  
  
 lisinopril-hydroCHLOROthiazide (PRINZIDE, ZESTORETIC) 20-25 mg per tablet Comments:  
Reason for Stopping:   
   
 levocetirizine (XYZAL) 5 mg tablet Comments:  
Reason for Stopping:   
   
  
 
  
Diet:  Regular diet. Activity:  As tolerated Disposition:   Home with Home health. PT recommended therapy at least 2 days/week in the home. OT recommended therapy up to 5 days/week in SNF setting or an intensive home health therapy with close supervision/assistance. Patient is NOT interested in going to SNF at this time. Patient would like Home Health with PT. Patient is alert and oriented and capable to make his own medical decisions. Daughter will be here to pick patient up and take patient home this evening. Discharge instructions to patient/family Please seek medical attention for any new or worsening symptoms particularly fever, chest pain, shortness of breath, abdominal pain, nausea, vomiting Follow up plans/appointments Follow-up Information Follow up With Specialties Details Why Contact Info George Scott MD Family Practice On 8/19/2019 1:15pm [address] [address] [place] [address] [phone]  [facility] [address] [place] [id number] 
[phone] Jasmin Hernández DO Family Medicine Resident For Billing Chief Complaint Patient presents with  Diarrhea  Fatigue Hospital Problems  Date Reviewed: 1/24/2019 Codes Class Noted POA * (Principal) Acute hyponatremia ICD-10-CM: E87.1 ICD-9-CM: 276.1  8/11/2019 Unknown Primary hypertension ICD-10-CM: I10 
ICD-9-CM: 401.9  5/9/2014 Yes Acquired hypothyroidism ICD-10-CM: E03.9 ICD-9-CM: 244.9  6/16/2010 Yes Coronary atherosclerosis of native coronary artery ICD-10-CM: I25.10 ICD-9-CM: 414.01  6/16/2010 Yes

## 2019-08-13 NOTE — PROGRESS NOTES
Problem: Self Care Deficits Care Plan (Adult)  Goal: *Acute Goals and Plan of Care (Insert Text)  Description    FUNCTIONAL STATUS PRIOR TO ADMISSION: Patient was independent and active without use of DME.    HOME SUPPORT: The patient lived alone with no local support. Occupational Therapy Goals  Initiated 8/12/2019  1. Patient will perform upper body dressing and bathing with independence within 7 day(s). 2.  Patient will perform lower body dressing and bathing with supervision/set-up within 7 day(s). 3.  Patient will perform toilet transfers with supervision/set-up within 7 day(s). 4.  Patient will perform all aspects of toileting with modified independence within 7 day(s). 5.  Patient will participate in upper extremity therapeutic exercise/activities with independence for 10 minutes within 7 day(s). 6.  Patient will utilize energy conservation techniques during functional activities with verbal cues within 7 day(s). Outcome: Progressing Towards Goal   OCCUPATIONAL THERAPY TREATMENT  Patient: Iraj Lagunas (73 y.o. male)  Date: 8/13/2019  Diagnosis: Acute hyponatremia [E87.1] Acute hyponatremia       Precautions: Fall  Chart, occupational therapy assessment, plan of care, and goals were reviewed. ASSESSMENT  Based on the objective data described below, patient tolerating increased activity today. Tremors/body shakes very mild today which is much improved from previous day. Patient limited by confusion, specifically having difficulty with short term memory; He asked repetitively where his wallet was despite reassurance it was in his drawer multiple times. He tolerated transfers into the bathroom for ADL retraining. He engaged in UB/LB bathing (sponge bath) standing and sitting, toileting, UB/LB dressing, and grooming tasks. He has poor safety awareness and insight into deficits.       Current Level of Function Impacting Discharge (ADLs): CGA for standing UB ADLs, min A required for LB ADLs and toileting, and CGA to min A required for ADL transfers. Other factors to consider for discharge: Lives alone; Per daughter, patient with safety concerns baseline cognitive impairments at baseline         PLAN :  Patient continues to benefit from skilled intervention to address the above impairments. Continue treatment per established plan of care. to address goals. Recommend with staff: Patient to be OOB to chair a minimum 3x/day for a minimum of one hour and transfer into the bathroom for toileting needs with min assist.       Recommend next OT session: ADL retraining, ADL transfers, therapeutic ex    Recommendation for discharge: (in order for the patient to meet his/her long term goals)  Therapy up to 5 days/week in SNF setting or an intensive home health therapy program with close supervision/assistance    This discharge recommendation:  A follow-up discussion with the attending provider and/or case management is planned    Equipment recommendations for successful discharge (if) home: none       SUBJECTIVE:   Patient stated Sarah Perez is my wallet? I can't find it anywhere    OBJECTIVE DATA SUMMARY:   Cognitive/Behavioral Status:  Neurologic State: Alert  Orientation Level: Oriented to person;Disoriented to time;Oriented to place; Disoriented to situation  Cognition: Follows commands;Memory loss;Poor safety awareness; Impulsive  Perception: Appears intact  Perseveration: Perseverates during conversation  Safety/Judgement: Awareness of environment    Functional Mobility and Transfers for ADLs:  Bed Mobility:  Rolling: Contact guard assistance  Supine to Sit: Contact guard assistance  Sit to Supine: Contact guard assistance  Scooting: Supervision    Transfers:  Sit to Stand: Contact guard assistance  Functional Transfers  Toilet Transfer : Contact guard assistance;Minimum assistance  Bed to Chair: Minimum assistance    Balance:  Sitting: Intact  Standing: With support  Standing - Static: Fair  Standing - Dynamic : Fair    ADL Intervention:       Grooming  Grooming Assistance: Contact guard assistance(standing at the sink )  Washing Face: Contact guard assistance  Washing Hands: Contact guard assistance  Brushing Teeth: Contact guard assistance  Brushing/Combing Hair: Contact guard assistance  Cues: Verbal cues provided    Upper Body Bathing  Bathing Assistance: Supervision;Set-up  Position Performed: Seated in chair  Cues: Verbal cues provided    Lower Body Bathing  Bathing Assistance: Minimum assistance  Position Performed: Standing;Seated on toilet  Cues: Verbal cues provided  Adaptive Equipment: Grab bar  Lower Body : Minimum assistance  Position Performed: Seated in chair  Cues: Verbal cues provided    Upper Body Dressing Assistance  Dressing Assistance: Set-up; Supervision  Hospital Gown: Set-up; Supervision    Lower Body Dressing Assistance  Dressing Assistance: Minimum assistance  Socks: Minimum assistance  Leg Crossed Method Used: Yes  Position Performed: Seated in chair;Standing  Cues: Verbal cues provided    Toileting  Toileting Assistance: Minimum assistance  Bladder Hygiene: Minimum assistance  Bowel Hygiene: Minimum assistance  Clothing Management: Minimum assistance  Cues: Verbal cues provided  Adaptive Equipment: Grab bars    Cognitive Retraining  Safety/Judgement: Awareness of environment    Activity Tolerance:   Good  Please refer to the flowsheet for vital signs taken during this treatment. After treatment patient left in no apparent distress:   Sitting in chair, Call bell within reach, Bed / chair alarm activated. COMMUNICATION/COLLABORATION:   The patients plan of care was discussed with: Physical Therapist, Registered Nurse and patient.      NICOLÁS Kohli/L  Time Calculation: 40 mins

## 2019-08-13 NOTE — PROGRESS NOTES
Nutrition Assessment:    RECOMMENDATIONS/INTERVENTION(S):   1. Continue with Cardiac Diet order. 2. Will order chocolate Ensure Enlive BID while PO intakes < 50% meals. 3. Monitor PO intakes of meals/ONS, labs, weight, GI (BM's). ASSESSMENT:   8/13: 79 yo male admitted for acute hyponatremia. MD consult for general nutrition management. PMhx: diverticulitis, ischemic colitis, CAD, CABG, HTN, HLD, prostate CA. Overweight per BMI per advanced age. Pt denies any recent weight loss PTA. Wt hx in EMR confirms this. Cardiac diet ordered. Pt states he has not had anything to eat since Saturday (x 4 days) when the nausea/abd pain/diarrhea started. Very poor PO intakes since admission. States he does not have an appetite and is still c/o some nausea. Requesting Ensure shakes, has had them before, likes chocolate. Reports usual appetite as good at home. Denies difficulty chewing/swallowing. Labs: Na+ 130 (trending up since admission). Meds reviewed. No diarrhea since admission. Skin intact. Diet Order: Cardiac  % Eaten:    Patient Vitals for the past 72 hrs:   % Diet Eaten   08/12/19 1732 50 %   08/12/19 1123 0 %   08/12/19 0723 0 %       Pertinent Medications: [x] Reviewed    Labs: [x] Reviewed    Anthropometrics: Height: 5' 4\" (162.6 cm) Weight: 76.4 kg (168 lb 6.4 oz)    IBW (%IBW):   ( ) UBW (%UBW):   (  %)      BMI: Body mass index is 28.91 kg/m². This BMI is indicative of:   [] Underweight    [] Normal    [x] Overweight    []  Obesity    []  Extreme Obesity (BMI>40)  Estimated Nutrition Needs (Based on): 9065 Kcals/day(REE 1344 x AF 1.3) , 61 g(61-76gm (0.8-1gm/kg/d)) Protein  Carbohydrate: At Least 130 g/day  Fluids: 1747 mL/day (1 ml/kcal)    Last BM: 8/11 (diarrhea PTA)  [x]Active     []Hyperactive  []Hypoactive       [] Absent   BS  Skin:    [x] Intact   [] Incision  [] Breakdown   [] DTI   [] Tears/Excoriation/Abrasion  []Edema [] Other:    Wt Readings from Last 30 Encounters: 08/12/19 76.4 kg (168 lb 6.4 oz)   07/25/19 72.5 kg (159 lb 14.4 oz)   05/06/19 72.8 kg (160 lb 9.6 oz)   04/10/19 73 kg (161 lb)   01/24/19 73 kg (161 lb)   01/16/19 73.9 kg (163 lb)   11/19/18 73 kg (161 lb)   11/08/18 73.5 kg (162 lb)   10/22/18 73.5 kg (162 lb)   09/04/18 73 kg (161 lb)   08/27/18 73.5 kg (162 lb)   06/05/18 69.9 kg (154 lb)   05/29/18 69.6 kg (153 lb 6 oz)   05/15/18 71.2 kg (157 lb)   05/01/18 70.8 kg (156 lb)   03/02/18 71.2 kg (157 lb)   01/30/18 71.2 kg (157 lb)   01/16/18 72.6 kg (160 lb)   12/05/17 71.2 kg (157 lb)   11/30/17 71.2 kg (157 lb)   11/16/17 73 kg (161 lb)   10/17/17 71.7 kg (158 lb)   10/13/17 71.7 kg (158 lb)   08/22/17 69.9 kg (154 lb)   07/20/17 68.9 kg (152 lb)   07/12/17 70.8 kg (156 lb)   04/17/17 65.1 kg (143 lb 8 oz)   02/20/17 70.3 kg (155 lb)   01/18/17 69.9 kg (154 lb)   01/10/17 70.5 kg (155 lb 8 oz)      NUTRITION DIAGNOSES:   Problem:  Inadequate energy intake     Etiology: related to decreased ability to consume sufficient energy      Signs/Symptoms: as evidenced by PO intakes < EEN's x 4 days per pt      NUTRITION INTERVENTIONS:  Meals/Snacks: General/healthful diet   Supplements: Commercial supplement              GOAL:   Consume > 50% all meals + ONS within next 2-4 days    Cultural, Bahai, or Ethnic Dietary Needs: None     EDUCATION & DISCHARGE NEEDS:    [x] None Identified   [] Identified and Education Provided/Documented   [] Identified and Pt declined/was not appropriate      [x] Interdisciplinary Care Plan Reviewed/Documented    [x] Discharge Needs:  - Continue to follow Cardiac Diet  [] No Nutrition Related Discharge Needs    NUTRITION RISK:   Pt Is At Nutrition Risk  [x]     No Nutrition Risk Identified  []       PT SEEN FOR:    [x]  MD Consult: []Calorie Count      []Diabetic Diet Education        []Diet Education     []Electrolyte Management     [x]General Nutrition Management and Supplements     []Management of Tube Feeding     []TPN Recommendations    []  RN Referral:  []MST score >=2     []Enteral/Parenteral Nutrition PTA     []Pregnant: Gestational DM or Multigestation                 [] Pressure Ulcer    []  Low BMI      []  Length of Stay       [] Dysphagia Diet         [] Ventilator  []  Follow-up     Previous Recommendations:   [] Implemented          [] Not Implemented          [x] Not Applicable    Previous Goal:   [] Met              [] Progressing Towards Goal              [] Not Progressing Towards Goal   [x] Not Applicable            Kayli Gordon, [address]  Pager [phone]  Phone [phone]

## 2019-08-13 NOTE — PROGRESS NOTES
*ATTENTION:  This note has been created by a medical student for educational purposes only. Please do not refer to the content of this note for clinical decision-making, billing, or other purposes. Please see attending physicians note to obtain clinical information on this patient. [facility]   Discharge/Transfer/Off-Service Note      Date: August 13, 2019    Dinorah hSahid  MRN: [id number]  YOB: 1930  Age: 80 y.o. Date of admission: 8/11/2019     Date of discharge/transfer: 8/13/2019    Primary Care Physician: Jagjit Rogers MD     Attending physician at admission: Dr. Malena Andino     Attending physician at discharge/transfer: Dr. Malena Andino     Resident physician at discharge/transfer: Mirian Ordaz     Consultants during hospitalization:      Admission diagnoses:   Acute hyponatremia [E87.1]    Discharge diagnoses:  Hospital Problems  Date Reviewed: 1/24/2019          Codes Class Noted POA    * (Principal) Acute hyponatremia ICD-10-CM: E87.1  ICD-9-CM: 276.1  8/11/2019 Unknown        Primary hypertension ICD-10-CM: I10  ICD-9-CM: 401.9  5/9/2014 Yes        Acquired hypothyroidism ICD-10-CM: E03.9  ICD-9-CM: 244.9  6/16/2010 Yes        Coronary atherosclerosis of native coronary artery ICD-10-CM: I25.10  ICD-9-CM: 414.01  6/16/2010 Yes               History of Present Illness per admitting resident Dr. Andrew Patrick:  Hadley Kirkland is a 80 y.o. male with known h/o diverticulitis, ischemic colitis, CAD s/p CABG 2001, hypothyroidism, HTN/HLD and prostate cancer (s/p radical prostatectomy ~1995) who presents via EMS to the ER complaining of 2 days of watery diarrhea, nausea and abdominal pain. He states that he woke up at midnight 2 days ago with diarrhea and has has innumerable episodes since then. His abdominal pain started suprapubic but is now paraumbilical and is more sore than painful at this time.  He has decreased PO intake due to nausea and does not know what he ate before his symptoms began. He denies any chest pain, SoB, cough, vomiting, hematchezia, melena, PPI or Abx use.     In the ED:  Vitals: Temp 97.5     /75    HR 78     RR 18   SatO2 96% on RA   Labs: Na 119, Cl 88, Cr 1.12  Imaging: None  Treatment: 0.5L NS, 10mg hydralazine, zofran. [facility] course with associated diagnosis:  Irma Paiz is a 80 y.o. male with PMHx of diverticulitis, ischemic colitis, CAD s/p CABG in 2001, hypothyroidism, HTN/HLD, and prostate cancer s/p protestectomy in 1995 who was hospitalized for acute hyponatremia in the setting of 48 hrs of nonbloody diarrhea.     Acute Hyponatremia: Improved to Na 132 upon discharge. Had similar episode in past with Na at 124 at [facility] in 2017.   -Got . 9% NaCL at 150ml/hr.  -Urine lytes showed urine Na 44, urine Cr <13, urine Os 138. Serum osm 260. FeNa 2.8%. -Got BMPs q4hr     Diarrhea/Nausea: No diarrhea during admission. -CT abd neg  -Got zofran 4mg q4h prn. Last dose 8am     Incidental Pulmonary Nodule:   -CT chest 8/12 showing no other nodules.      HTN:   -Home lisinopril continued  -Got Hydralazine 10mg prn. Depression: Tearful discussing wife's passing 3 years prior. Says he's discussed with providers before and doesn't want to take pills.   -Follow up at transition of care appointment     Hypothyroid. Most recent TSH was 2.16 on 7/25/19  - Continued home synthroid 112 mcg while inpatient     CAD s/p CABG 2001  -Monitored w/ telemetry  -Continued home baby aspirin     Insomnia:   -Held home ativan 2mg       Physical exam at discharge:   Visit Vitals  /65 (BP 1 Location: Right arm, BP Patient Position: At rest)   Pulse 94   Temp 98.5 °F (36.9 °C)   Resp 12   Ht 5' 4\" (1.626 m)   Wt 158 lb 15.2 oz (72.1 kg)   SpO2 96%   BMI 27.28 kg/m²       Gen: Awake, alert, NAD  Eye: PERRL, EOMI  Cards: RRR, no m/r/g, DP intact bilaterally  Resp: CTAB, no w/r/r  Abd: Normal BS, NT, ND  Ext: No swelling.  No tenderness. Condition at discharge: Stable    Disposition: Home w/ PT 2x/wk. Recommended follow-up tests after discharge: none     Diet: Per nutrition consult, recommended Ensures 2x/day to supplement po intake. Activity: As tolerated. Labs:  Recent Labs     08/13/19  0104 08/12/19  0213 08/11/19  1814   WBC 8.2 9.5 10.1   HGB 13.1 13.4 13.7   HCT 38.1 38.0 38.4    229 287     Recent Labs     08/13/19  1423 08/13/19  0549 08/13/19  0104   * 130* 130*   K 4.1 4.0 4.5    100 99   CO2 25 22 24   BUN 11 9 10   CREA 0.99 0.97 1.06   * 80 80   CA 8.0* 8.1* 7.8*     Recent Labs     08/11/19  1844   SGOT 17   ALT 15   AP 82   TBILI 0.5   TP 6.8   ALB 3.5   GLOB 3.3   LPSE 265     No results for input(s): INR, PTP, APTT in the last 72 hours. No lab exists for component: INREXT, INREXT   No results for input(s): FE, TIBC, PSAT, FERR in the last 72 hours. No results for input(s): PH, PCO2, PO2 in the last 72 hours. No results for input(s): CPK, CKMB in the last 72 hours. No lab exists for component: TROPONINI  No results found for: Chris Pineda     All Micro Results     Procedure Component Value Units Date/Time    URINE CULTURE HOLD SAMPLE [[id number]] Collected:  08/12/19 1100    Order Status:  Completed Specimen:  Serum Updated:  08/12/19 1107     Urine culture hold       URINE ON HOLD IN MICROBIOLOGY DEPT FOR 3 DAYS. IF UNPRESERVED URINE IS SUBMITTED, IT CANNOT BE USED FOR ADDITIONAL TESTING AFTER 24 HRS, RECOLLECTION WILL BE REQUIRED. C. DIFFICILE AG & TOXIN A/B [[id number]]     Order Status:  Canceled Specimen:  Stool     CULTURE, STOOL [[id number]]     Order Status:  Canceled Specimen:  Stool     OVA & PARASITES, STOOL [[id number]]     Order Status:  Canceled Specimen:  Stool           Diagnostic Studies and Procedures:   CT Abd 8/12: No acute abd findings. 4.5mm right middle pulm nodule.    CT Chest 8/12/19: Nonspecific noncalcified 4 mm nodule subpleural in the right middle  lobe. Otherwise negative. Discharge/Transfer Medications:  Current Discharge Medication List      CONTINUE these medications which have CHANGED    Details   lisinopril (PRINIVIL, ZESTRIL) 20 mg tablet Take 1 Tab by mouth daily. Qty: 30 Tab, Refills: 0         CONTINUE these medications which have NOT CHANGED    Details   Bifidobacterium Infantis (ALIGN) 4 mg cap Take 4 mg by mouth daily. LORazepam (ATIVAN) 2 mg tablet Take 1 mg by mouth nightly. dicyclomine (BENTYL) 10 mg capsule Take 10 mg by mouth every eight (8) hours as needed. levothyroxine (SYNTHROID) 112 mcg tablet TAKE 1 TABLET BY MOUTH DAILY BEFORE BREAKFAST  Qty: 90 Tab, Refills: 0    Associated Diagnoses: Acquired hypothyroidism      ondansetron (ZOFRAN ODT) 4 mg disintegrating tablet Take 4 mg by mouth every eight (8) hours as needed. aspirin delayed-release 81 mg tablet Take 81 mg by mouth daily. acetaminophen (TYLENOL) 325 mg tablet Take 325 mg by mouth every four (4) hours as needed for Pain. STOP taking these medications       lisinopril-hydroCHLOROthiazide (PRINZIDE, ZESTORETIC) 20-25 mg per tablet Comments:   Reason for Stopping:         levocetirizine (XYZAL) 5 mg tablet Comments:   Reason for Stopping: Follow up plans/appointments:   Follow-up Information     Follow up With Specialties Details Why Contact Info    Shelbie Dacosta MD Family Practice Go on 8/19/2019 Hospital followup at 1:15pm [address] [facility]  [phone]      AT [address] [place] [address] [address]  for home PT/OT [address] [place] [address]  [phone]         Discharge instructions to patient/family  Please seek medical attention for any new or worsening symptoms particularly fever, chest pain, shortness of breath, abdominal pain, nausea, vomiting    Machelle Ordaz  M4, Acting Intern      Cc: Shelbie Dacosta MD

## 2019-08-13 NOTE — DISCHARGE SUMMARY
[address] [facility] [address] [address]   Office [phone]  Fax [phone]       Discharge / Transfer / Off-Service Note     Name: Rosina Salgado MRN: [id number]  Sex: Male   YOB: 1930  Age: 80 y.o. PCP: Shelbie Dacosta MD     Date of admission: 8/11/2019  Date of discharge/transfer: 8/13/2019    Attending physician at admission: Subha Martinez,   Attending physician at discharge/transfer: Subha Martinez,   Resident physician at discharge/transfer: Stephenie Andrade DO     Consultants during hospitalization  IP CONSULT TO [facility]     Admission diagnoses   Acute hyponatremia [E87.1]    Recommended follow-up after discharge    1. PCP       Things to follow up on with PCP:   Low Na, diarrhea, HTN     Medication Changes:  1. NEW MEDICATIONS: Lisinopril 20mg daily     2. DISCONTINUED: Prinzide         History of Present Illness    As per admitting provider, Dr. Vadim López of Present Illness  Rosina Salgado is a 80 y.o. male with known h/o diverticulitis, ischemic colitis, CAD s/p CABG 2001, hypothyroidism, HTN/HLD and prostate cancer (s/p radical prostatectomy ~1995) who presents via EMS to the ER complaining of 2 days of watery diarrhea, nausea and abdominal pain. He states that he woke up at midnight 2 days ago with diarrhea and has has innumerable episodes since then. His abdominal pain started suprapubic but is now paraumbilical and is more sore than painful at this time. He has decreased PO intake due to nausea and does not know what he ate before his symptoms began.  He denies any chest pain, SoB, cough, vomiting, hematchezia, melena, PPI or Abx use.     In the ED:  Vitals: Temp 97.5     /75    HR 78     RR 18   SatO2 96% on RA   Labs: Na 119, Cl 88, Cr 1.12  Imaging: None  Treatment: 0.5L NS, 10mg hydralazine, zofran     EKG: Pending\"      Hospital course    Rosina Salgado was admitted into the Family Medicine Service from 8/11/2019 to 8/13/19 for Acute hyponatremia Ray. 1]. During the course of this admission, the following conditions were addressed/managed; Acute Hypovolemic hypotonic hyponatremia- (Resolved) POA Na 116 (BL Na 140). Likely 2/2  profuse diarrhea prior to admission. Urine lytes- Ur Na 44, Ur Cr <13, Ur Os 138, Serum om 260, FeNa 2.8%. Na improved to 132 at discharge after fluids during admission.  - Follow up BMP with PCP     Diarrhea- (Resolved) Unkown etiology. Hx of diverticulitis and perforation from abscess. CT abdomen no acute process. No BM during admission. Fluid resuscitation provided.      Hypertension: Discontinue home Prinzide due to hyponatremia.   - Continue Lisinopril 20mg daily  - Follow up with PCP and titrate Lisinopril up as needed.      Hypothyroidism- Last TSH 7/25 and was 2.160.   - Continue home synthroid 112 mcg     CAD s/p CABG 2001- No echo on file. No anginal complaints at this time  - Continue home ASA 81mg     Allergies  - Continue xyzal and flonase for sinus congestion     Insomnia  - Continue home ativan 2mg and Melatonin 3mg QHS      Physical exam at discharge:    Vitals Reviewed. Patient Vitals for the past 12 hrs:   Temp Pulse Resp BP SpO2   08/13/19 1550 98.5 °F (36.9 °C) 94 12 159/65 96 %   08/13/19 1147 98.2 °F (36.8 °C) 76 17 150/54 97 %   08/13/19 0835 98 °F (36.7 °C) 79 19 165/62 96 %   08/13/19 0700  77      08/13/19 0412 99 °F (37.2 °C) 81 13 154/60 98 %      General Oriented to person, place, and time and well-developed. Appears well-nourished, no distress. Not diaphoretic. HENT Head Normocephalic and atraumatic. Eyes Conjunctivae are normal, no discharge. No scleral icterus. Neck No thyromegaly present. No cervical adenopathy. Cardio Normal rate, regular rhythm. Exam reveals no gallop and no friction rub. Aortic systolic ejection murmur present. No chest wall tenderness. Pulmonary Effort normal and breath sounds normal. No respiratory distress. No wheezes, no rales. Abdominal Soft. Bowel sounds normal. No distension. No tenderness. Extremities No edema of lower extremities. No tenderness. Distal pulses intact. Neurological Alert and oriented to person, place, and time. Dermatology Skin is warm and dry. No rash noted. No erythema or pallor. Psychiatric Affect and judgment normal.        Condition at discharge: Stable. Labs  Recent Labs     08/13/19  0104 08/12/19  0213 08/11/19  1814   WBC 8.2 9.5 10.1   HGB 13.1 13.4 13.7   HCT 38.1 38.0 38.4    229 287     Recent Labs     08/13/19  1423 08/13/19  0549 08/13/19  0104   * 130* 130*   K 4.1 4.0 4.5    100 99   CO2 25 22 24   BUN 11 9 10   CREA 0.99 0.97 1.06   * 80 80   CA 8.0* 8.1* 7.8*     Recent Labs     08/11/19  1844   SGOT 17   ALT 15   AP 82   TBILI 0.5   TP 6.8   ALB 3.5   GLOB 3.3   LPSE 265     No results for input(s): PH, PCO2, PO2, TNIPOC, TROIQ, INR, PTP, APTT, FE, TIBC, PSAT, FERR, GLUCPOC in the last 72 hours. No lab exists for component: GLPOC, INREXT, INREXT  Cultures  · None    Procedures / Diagnostic Studies  · CT abd/pelvis: no acute process  · CT chest: 4mm subpleural nodule in the R middle lobe. Imaging  No results found for this or any previous visit. No results found for this or any previous visit. Results from [place] encounter on 08/11/19   CT CHEST W CONT    Narrative INDICATION: pulmonary nodule    COMPARISON: CT abdomen 8/11/2019    TECHNIQUE:  Following the uneventful intravenous administration of 100 cc  Isovue-300, 5 mm axial images were obtained through the chest. Coronal and  sagittal reconstructions were generated. CT dose reduction was achieved through  use of a standardized protocol tailored for this examination and automatic  exposure control for dose modulation. FINDINGS:    The 4 mm subpleural nodule in the right middle lobe is again noted. There are no  other nodules identified.  The lungs are otherwise fully expanded and clear.    No hilar or mediastinal adenopathy. No pleural effusion. Upper abdomen  unremarkable. Impression IMPRESSION: Nonspecific noncalcified 4 mm nodule subpleural in the right middle  lobe. Otherwise negative. No procedure found. Chronic diagnoses   Problem List as of 8/13/2019 Date Reviewed: 1/24/2019          Codes Class Noted - Resolved    * (Principal) Acute hyponatremia ICD-10-CM: E87.1  ICD-9-CM: 276.1  8/11/2019 - Present        Episode of recurrent major depressive disorder ([facility] ICD-10-CM: F33.9  ICD-9-CM: 296.30  5/1/2018 - Present        Stress incontinence of urine ICD-10-CM: N39.3  ICD-9-CM: Dinesh McClure  1/30/2018 - Present        Diverticulitis of large intestine with perforation and abscess without bleeding ICD-10-CM: K57.20  ICD-9-CM: 562.11  12/5/2017 - Present        Ischemic colitis ([facility] ICD-10-CM: K55.9  ICD-9-CM: 557.9  7/12/2017 - Present        Advance care planning ICD-10-CM: Z71.89  ICD-9-CM: V65.49  1/18/2017 - Present    Overview Signed 1/18/2017  2:30 PM by George Scott MD     Has a Lw             Insomnia ICD-10-CM: G47.00  ICD-9-CM: 780.52  10/15/2015 - Present        Primary hypertension ICD-10-CM: I10  ICD-9-CM: 401.9  5/9/2014 - Present        Cancer ([facility] ICD-10-CM: C80.1  ICD-9-CM: 199.1  Unknown - Present    Overview Signed 6/16/2010 11:17 AM by Carmina Winn MD     prostate             Acquired hypothyroidism ICD-10-CM: E03.9  ICD-9-CM: 244.9  6/16/2010 - Present        Mixed hyperlipidemia ICD-10-CM: E78.2  ICD-9-CM: 272.2  6/16/2010 - Present        Coronary atherosclerosis of native coronary artery ICD-10-CM: I25.10  ICD-9-CM: 414.01  6/16/2010 - Present              Discharge/Transfer Medications  Current Discharge Medication List      CONTINUE these medications which have CHANGED    Details   lisinopril (PRINIVIL, ZESTRIL) 20 mg tablet Take 1 Tab by mouth daily.   Qty: 30 Tab, Refills: 0         CONTINUE these medications which have NOT CHANGED Details   Bifidobacterium Infantis (ALIGN) 4 mg cap Take 4 mg by mouth daily. LORazepam (ATIVAN) 2 mg tablet Take 1 mg by mouth nightly. dicyclomine (BENTYL) 10 mg capsule Take 10 mg by mouth every eight (8) hours as needed. levothyroxine (SYNTHROID) 112 mcg tablet TAKE 1 TABLET BY MOUTH DAILY BEFORE BREAKFAST  Qty: 90 Tab, Refills: 0    Associated Diagnoses: Acquired hypothyroidism      ondansetron (ZOFRAN ODT) 4 mg disintegrating tablet Take 4 mg by mouth every eight (8) hours as needed. aspirin delayed-release 81 mg tablet Take 81 mg by mouth daily. acetaminophen (TYLENOL) 325 mg tablet Take 325 mg by mouth every four (4) hours as needed for Pain. STOP taking these medications       lisinopril-hydroCHLOROthiazide (PRINZIDE, ZESTORETIC) 20-25 mg per tablet Comments:   Reason for Stopping:         levocetirizine (XYZAL) 5 mg tablet Comments:   Reason for Stopping:                Diet:  Regular diet. Activity:  As tolerated     Disposition:   Home with Home health. PT recommended SNF vs home health with supervision. OT recommended therapy up to 5 days/week in SNF setting or an intensive home health therapy with close supervision/assistance. Patient is NOT interested in going to SNF at this time. Patient would like Home Health with PT. Patient is alert and oriented and capable to make his own medical decisions. Daughter will be here to pick patient up and take patient home this evening.      Discharge instructions to patient/family  Please seek medical attention for any new or worsening symptoms particularly fever, chest pain, shortness of breath, abdominal pain, nausea, vomiting    Follow up plans/appointments  Follow-up Information     Follow up With Specialties Details Why Contact Info    Alexander Ramsey MD [facility] On 8/19/2019 1:15pm [address] [facility] [address]  [phone]      AT [address] [place] [address] [address]   [address] [place] [address] [address]   Family Medicine Resident       For Billing    Chief Complaint   Patient presents with    Diarrhea   [facility]       Hospital Problems  Date Reviewed: 1/24/2019          Codes Class Noted POA    * (Principal) Acute hyponatremia ICD-10-CM: E87.1  ICD-9-CM: 276.1  8/11/2019 Unknown        Primary hypertension ICD-10-CM: I10  ICD-9-CM: 401.9  5/9/2014 Yes        Acquired hypothyroidism ICD-10-CM: E03.9  ICD-9-CM: 244.9  6/16/2010 Yes        Coronary atherosclerosis of native coronary artery ICD-10-CM: I25.10  ICD-9-CM: 414.01  6/16/2010 Yes

## 2019-08-13 NOTE — PROGRESS NOTES
Spiritual Care Partner Volunteer visited patient on the [facility]. Care Unit on 8/13/19. Documented by:  Antonina Montgomery.      Paging Service: [phone])

## 2019-08-14 ENCOUNTER — PATIENT OUTREACH (OUTPATIENT)
Dept: FAMILY MEDICINE CLINIC | Age: 84
End: 2019-08-14

## 2019-08-19 ENCOUNTER — DOCUMENTATION ONLY (OUTPATIENT)
Dept: FAMILY MEDICINE CLINIC | Age: 84
End: 2019-08-19

## 2019-08-19 ENCOUNTER — HOSPITAL ENCOUNTER (OUTPATIENT)
Dept: LAB | Age: 84
Discharge: HOME | End: 2019-08-19
Payer: MEDICARE

## 2019-08-19 ENCOUNTER — OFFICE VISIT (OUTPATIENT)
Dept: FAMILY MEDICINE CLINIC | Age: 84
End: 2019-08-19

## 2019-08-19 VITALS
WEIGHT: 156.6 LBS | SYSTOLIC BLOOD PRESSURE: 121 MMHG | OXYGEN SATURATION: 98 % | DIASTOLIC BLOOD PRESSURE: 61 MMHG | TEMPERATURE: 97.2 F | BODY MASS INDEX: 26.73 KG/M2 | HEART RATE: 76 BPM | HEIGHT: 64 IN | RESPIRATION RATE: 16 BRPM

## 2019-08-19 DIAGNOSIS — R73.9 ELEVATED BLOOD SUGAR: ICD-10-CM

## 2019-08-19 DIAGNOSIS — E87.1 HYPONATREMIA: Primary | ICD-10-CM

## 2019-08-19 DIAGNOSIS — I10 ESSENTIAL HYPERTENSION: ICD-10-CM

## 2019-08-19 DIAGNOSIS — E03.9 ACQUIRED HYPOTHYROIDISM: ICD-10-CM

## 2019-08-19 DIAGNOSIS — R19.7 DIARRHEA, UNSPECIFIED TYPE: ICD-10-CM

## 2019-08-19 PROCEDURE — 80053 COMPREHEN METABOLIC PANEL: CPT

## 2019-08-19 NOTE — PROGRESS NOTES
Chief Complaint   Patient presents with   [facility] Follow Up     8/11/19 [facility] ED Diarrhea./nausea: Low sodium    Decreased Appetite     1. Have you been to the ER, urgent care clinic since your last visit? Hospitalized since your last visit? Yes Where: [facility] ED 8/11/19    2. Have you seen or consulted any other health care providers outside of the [address] [place] [address] since your last visit? Include any pap smears or colon screening. No     Feels better  No diarrhea  See NN note and dc summary      Chief Complaint   Patient presents with   [facility] Follow Up     8/11/19 [facility] ED Diarrhea./nausea: Low sodium    Decreased Appetite     He is a 80 y.o. male who presents for evalution. Reviewed PmHx, RxHx, FmHx, SocHx, AllgHx and updated and dated in the chart.     Patient Active Problem List    Diagnosis    Acute hyponatremia    Episode of recurrent major depressive disorder ([facility]    Stress incontinence of urine    Diverticulitis of large intestine with perforation and abscess without bleeding    Ischemic colitis ([facility]    Advance care planning     Has a Lw      Insomnia    Primary hypertension    Acquired hypothyroidism    Mixed hyperlipidemia    Coronary atherosclerosis of native coronary artery    Cancer [facility])     prostate         Review of Systems - negative except as listed above in the HPI    Objective:     Vitals:    08/19/19 1337   BP: 121/61   Pulse: 76   Resp: 16   Temp: 97.2 °F (36.2 °C)   TempSrc: Oral   SpO2: 98%   Weight: 156 lb 9.6 oz (71 kg)   Height: 5' 4\" (1.626 m)     Physical Examination: General appearance - alert, well appearing, and in no distress  Mouth - mucous membranes moist, pharynx normal without lesions  Neck - supple, no significant adenopathy  Chest - clear to auscultation, no wheezes, rales or rhonchi, symmetric air entry  Heart - normal rate, regular rhythm, normal S1, S2, no murmurs, rubs, clicks or gallops  Abdomen - soft, nontender, nondistended, no masses or organomegaly  Extremities - peripheral pulses normal, no pedal edema, no clubbing or cyanosis    Assessment/ Plan:   Diagnoses and all orders for this visit:    1. Hyponatremia  -     METABOLIC PANEL, COMPREHENSIVE  -feels better  -see NN note and dc summary  -cont to hydrate at home    2. Diarrhea, unspecified type  -     METABOLIC PANEL, COMPREHENSIVE  -resolved    3. Essential hypertension  -     METABOLIC PANEL, COMPREHENSIVE  -at goal  -off HCTZ now  -ok with ace    4. Elevated blood sugar  -     METABOLIC PANEL, COMPREHENSIVE  Lab Results   Component Value Date/Time    Hemoglobin A1c 5.6 07/25/2019 07:50 AM       5. Acquired hypothyroidism  -  Lab Results   Component Value Date/Time    TSH 2.160 07/25/2019 07:50 AM          Follow-up and Dispositions    · Return if symptoms worsen or fail to improve. I have discussed the diagnosis with the patient and the intended plan as seen in the above orders. The patient understands and agrees with the plan. The patient has received an after-visit summary and questions were answered concerning future plans. Medication Side Effects and Warnings were discussed with patient  Patient Labs were reviewed and or requested:  Patient Past Records were reviewed and or requested    Kasey Porras M.D. There are no Patient Instructions on file for this visit.

## 2019-08-19 NOTE — PROGRESS NOTES
[employer] [facility] order was signed & faxed to [phone],ok,Copy placed in scan folder to be scanned to chart.

## 2019-08-20 LAB
ALBUMIN SERPL-MCNC: 4 G/DL (ref 3.5–4.7)
ALBUMIN/GLOB SERPL: 1.6 {RATIO} (ref 1.2–2.2)
ALP SERPL-CCNC: 76 IU/L (ref 39–117)
ALT SERPL-CCNC: 8 IU/L (ref 0–44)
AST SERPL-CCNC: 15 IU/L (ref 0–40)
BILIRUB SERPL-MCNC: <0.2 MG/DL (ref 0–1.2)
BUN SERPL-MCNC: 30 MG/DL (ref 8–27)
BUN/CREAT SERPL: 26 (ref 10–24)
CALCIUM SERPL-MCNC: 9 MG/DL (ref 8.6–10.2)
CHLORIDE SERPL-SCNC: 94 MMOL/L (ref 96–106)
CO2 SERPL-SCNC: 21 MMOL/L (ref 20–29)
CREAT SERPL-MCNC: 1.14 MG/DL (ref 0.76–1.27)
GLOBULIN SER CALC-MCNC: 2.5 G/DL (ref 1.5–4.5)
GLUCOSE SERPL-MCNC: 81 MG/DL (ref 65–99)
INTERPRETATION: NORMAL
POTASSIUM SERPL-SCNC: 4.7 MMOL/L (ref 3.5–5.2)
PROT SERPL-MCNC: 6.5 G/DL (ref 6–8.5)
SODIUM SERPL-SCNC: 131 MMOL/L (ref 134–144)

## 2019-08-26 ENCOUNTER — PATIENT OUTREACH (OUTPATIENT)
Dept: FAMILY MEDICINE CLINIC | Age: 84
End: 2019-08-26

## 2019-08-26 ENCOUNTER — DOCUMENTATION ONLY (OUTPATIENT)
Dept: FAMILY MEDICINE CLINIC | Age: 84
End: 2019-08-26

## 2019-08-26 NOTE — PROGRESS NOTES
Goals      Attends follow-up appointments as directed. 8/15/19 - Patient will attend PCP appointment on Monday 8/19. PCP to recheck labs and BP. Will monitor. YANNI    8/26/19 - Patient reports doing well. PCP repeated labs and reviewed results, no change to plan of care. Patient is being seen by [place]. Had no questions or concerns at this time.  YANNI

## 2019-08-27 ENCOUNTER — DOCUMENTATION ONLY (OUTPATIENT)
Dept: FAMILY MEDICINE CLINIC | Age: 84
End: 2019-08-27

## 2019-08-27 NOTE — PROGRESS NOTES
[employer] in [address] were signed & faxed to [phone],ok,Copy placed in scan folder to be scanned to chart.

## 2019-09-05 ENCOUNTER — DOCUMENTATION ONLY (OUTPATIENT)
Dept: FAMILY MEDICINE CLINIC | Age: 84
End: 2019-09-05

## 2019-09-09 ENCOUNTER — DOCUMENTATION ONLY (OUTPATIENT)
Dept: FAMILY MEDICINE CLINIC | Age: 84
End: 2019-09-09

## 2019-09-09 NOTE — PROGRESS NOTES
[employer] [facility] order was put on [facility] [place] desk to process

## 2019-09-10 ENCOUNTER — DOCUMENTATION ONLY (OUTPATIENT)
Dept: FAMILY MEDICINE CLINIC | Age: 84
End: 2019-09-10

## 2019-09-10 NOTE — PROGRESS NOTES
[employer] [facility] order was signed & faxed to [phone],ok,Copy placed in scan folder to be scanned to chart.

## 2019-09-13 ENCOUNTER — DOCUMENTATION ONLY (OUTPATIENT)
Dept: FAMILY MEDICINE CLINIC | Age: 84
End: 2019-09-13

## 2019-09-13 NOTE — PROGRESS NOTES
[employer] in [address] order was put on [facility] desk to process

## 2019-09-16 ENCOUNTER — PATIENT OUTREACH (OUTPATIENT)
Dept: FAMILY MEDICINE CLINIC | Age: 84
End: 2019-09-16

## 2019-09-16 NOTE — PROGRESS NOTES
Patient has graduated from the Transitions of Care Coordination  program on 9/16/19. Patient's symptoms are stable at this time. Patient/family has the ability to self-manage. Care management goals have been completed at this time. No further nurse navigator follow up scheduled. Goals Addressed                 This Visit's Progress     COMPLETED: Attends follow-up appointments as directed. 8/15/19 - Patient will attend PCP appointment on Monday 8/19. PCP to recheck labs and BP. Will monitor. YANNI    8/26/19 - Patient reports doing well. PCP repeated labs and reviewed results, no change to plan of care. Patient is being seen by [facility]. Had no questions or concerns at this time. YANNI    9/16/19 - Closing 30 day RUSLAN. YANNI            Pt has nurse navigator's contact information for any further questions, concerns, or needs. Patients upcoming visits:  No future appointments.

## 2019-09-17 ENCOUNTER — DOCUMENTATION ONLY (OUTPATIENT)
Dept: FAMILY MEDICINE CLINIC | Age: 84
End: 2019-09-17

## 2019-09-17 NOTE — PROGRESS NOTES
[employer] [facility] order was signed 7 faxed to [phone],ok,Copy placed in scan folder to be scanned to chart.

## 2019-10-03 DIAGNOSIS — E03.9 ACQUIRED HYPOTHYROIDISM: ICD-10-CM

## 2019-10-04 RX ORDER — LEVOTHYROXINE SODIUM 112 UG/1
TABLET ORAL
Qty: 90 TAB | Refills: 0 | Status: SHIPPED | OUTPATIENT
Start: 2019-10-04 | End: 2019-11-20 | Stop reason: ALTCHOICE

## 2019-10-29 ENCOUNTER — OFFICE VISIT (OUTPATIENT)
Dept: FAMILY MEDICINE CLINIC | Age: 84
End: 2019-10-29

## 2019-10-29 VITALS
SYSTOLIC BLOOD PRESSURE: 134 MMHG | OXYGEN SATURATION: 100 % | DIASTOLIC BLOOD PRESSURE: 73 MMHG | WEIGHT: 156 LBS | RESPIRATION RATE: 18 BRPM | TEMPERATURE: 97.9 F | HEIGHT: 64 IN | BODY MASS INDEX: 26.63 KG/M2 | HEART RATE: 73 BPM

## 2019-10-29 DIAGNOSIS — B35.6 TINEA CRURIS: Primary | ICD-10-CM

## 2019-10-29 RX ORDER — CICLOPIROX OLAMINE 7.7 MG/G
CREAM TOPICAL
Qty: 30 G | Refills: 0 | Status: SHIPPED | OUTPATIENT
Start: 2019-10-29 | End: 2019-11-20 | Stop reason: ALTCHOICE

## 2019-10-29 NOTE — PROGRESS NOTES
Chief Complaint   Patient presents with    Skin Problem     Patient in office today for possible jock itch that began couple wks ago. Pt has tried lotrimin powder,kenalog cream,demend with no relief noted. 1. Have you been to the ER, urgent care clinic since your last visit? Hospitalized since your last visit? No    2. Have you seen or consulted any other health care providers outside of the [address] [place] [address] since your last visit? Include any pap smears or colon screening.  No

## 2019-10-29 NOTE — PATIENT INSTRUCTIONS
Jock Itch: Care Instructions  Your Care Instructions  Jock itch is a fungal infection of the groin. The fungus that causes jock itch lives on your skin. It often affects male athletes, but anyone can get jock itch. You may get an itchy rash on your inner thighs and rear end (buttocks). It spreads and starts to itch when you sweat or are in steamy showers or locker rooms. Jock itch should end soon if you keep your skin dry after you clean it. You can treat jock itch at home with antifungal creams and powders that you can buy without a prescription. Follow-up care is a key part of your treatment and safety. Be sure to make and go to all appointments, and call your doctor if you are having problems. It's also a good idea to know your test results and keep a list of the medicines you take. How can you care for yourself at home? · Wash the rash with soap and water. · Wet a soft cloth with cool water alone or mixed with baking soda or essential oils such as lavender or tonia. Put the cool compress on the skin to relieve itching. · If you have large areas of blisters or sores, use compresses such as those made with Burow's solution (available without a prescription) to soothe and dry out the blisters. · Spread antifungal cream or powder over, and beyond the edge of, the rash. Follow the directions on the package. · If your doctor prescribed medicine, take it exactly as directed. Call your doctor if you have any problems with your medicine. · Try not to scratch the rash. · Shower or bathe daily and after you exercise. · Keep your skin dry as much as possible to allow it to heal.  · Until your jock itch is cured, wear loose-fitting cotton clothing. Avoid tight underwear, pants, and panty hose. · Wash your supporters and shorts after every wearing. · Do not share clothing, sports equipment, towels, or sheets to avoid spreading the fungi to other people.   To prevent jock itch  · Put on socks before you put on underwear if you have athlete's foot. This action helps prevent the fungus on your feet from spreading to your groin. · Wash your workout clothes, underwear, socks, and towels after each use. · Keep your groin, inner thighs, and buttocks clean and dry, especially after you exercise and shower. · Use a powder on your groin, inner thighs, and buttocks to help keep these areas dry. · Do not borrow or lend clothing, sports equipment, towels, or sheets. · Wear slippers or sandals in locker rooms, showers, and public bathing areas. When should you call for help? Call your doctor now or seek immediate medical care if:    · You have signs of infection, such as:  ? Increased pain, swelling, warmth, or redness. ? Red streaks leading from the rash. ? Pus draining from the rash. ? A fever.    Watch closely for changes in your health, and be sure to contact your doctor if:    · You do not get better as expected. Where can you learn more? Go to http://camille-joe.info/. Enter G303 in the search box to learn more about \"Jock Itch: Care Instructions. \"  Current as of: April 1, 2019  Content Version: 12.2  © 9548-8918 [employer]. Care instructions adapted under license by [employer] (which disclaims liability or warranty for this information). If you have questions about a medical condition or this instruction, always ask your healthcare professional. [place] [address] any warranty or liability for your use of this information.

## 2019-10-29 NOTE — PROGRESS NOTES
Chief Complaint   Patient presents with    Skin Problem     Patient in office today for possible jock itch that began couple wks ago. Pt has tried lotrimin powder,kenalog cream,demend with no relief noted. Just very itchy. Denies any open skin or redness. Just an itch. Has not had any relief with OTC medications. Started 3 weeks ago, eased up and then returned. Denies any new soaps, lotions or detergents. Wears depends. Has had problems with urinary incontinence since having prostate surgery 4+ years ago. Denies change in brand. Chief Complaint   Patient presents with    Skin Problem     he is a 80y.o. year old male who presents for evalution. Reviewed PmHx, RxHx, FmHx, SocHx, AllgHx and updated and dated in the chart. Review of Systems - negative except as listed above in the HPI    Objective:     Vitals:    10/29/19 1356   BP: 134/73   Pulse: 73   Resp: 18   Temp: 97.9 °F (36.6 °C)   TempSrc: Oral   SpO2: 100%   Weight: 156 lb (70.8 kg)   Height: 5' 4\" (1.626 m)     Physical Examination: General appearance - alert, well appearing, and in no distress  Chest - clear to auscultation, no wheezes, rales or rhonchi, symmetric air entry  Heart - normal rate, regular rhythm, normal S1, S2   Male - SCROTUM: erythematous with fissuring of the skin, consistent with tinea    Assessment/ Plan:   Diagnoses and all orders for this visit:    1. Tinea cruris  -     ciclopirox (LOPROX) 0.77 % topical cream; Apply twice daily, gently massage into affected areas and surrounding skin for up to 4 weeks. Apply as directed. Reviewed other supportive measures. Follow up if sx persist or worsen. I have discussed the diagnosis with the patient and the intended plan as seen in the above orders. The patient has received an after-visit summary and questions were answered concerning future plans.      Medication Side Effects and Warnings were discussed with patient: yes  Patient Labs were reviewed and or requested: no  Patient Past Records were reviewed and or requested  yes  Patient / Caregiver Understanding of treatment plan was verbalized during office visit [facility] [place]    Patient Instructions          Jock Itch: Care Instructions  Your Care Instructions  Jock itch is a fungal infection of the groin. The fungus that causes jock itch lives on your skin. It often affects male athletes, but anyone can get jock itch. You may get an itchy rash on your inner thighs and rear end (buttocks). It spreads and starts to itch when you sweat or are in steamy showers or locker rooms. Jock itch should end soon if you keep your skin dry after you clean it. You can treat jock itch at home with antifungal creams and powders that you can buy without a prescription. Follow-up care is a key part of your treatment and safety. Be sure to make and go to all appointments, and call your doctor if you are having problems. It's also a good idea to know your test results and keep a list of the medicines you take. How can you care for yourself at home? · Wash the rash with soap and water. · Wet a soft cloth with cool water alone or mixed with baking soda or essential oils such as lavender or tonia. Put the cool compress on the skin to relieve itching. · If you have large areas of blisters or sores, use compresses such as those made with Burow's solution (available without a prescription) to soothe and dry out the blisters. · Spread antifungal cream or powder over, and beyond the edge of, the rash. Follow the directions on the package. · If your doctor prescribed medicine, take it exactly as directed. Call your doctor if you have any problems with your medicine. · Try not to scratch the rash. · Shower or bathe daily and after you exercise. · Keep your skin dry as much as possible to allow it to heal.  · Until your jock itch is cured, wear loose-fitting cotton clothing.  Avoid tight underwear, pants, and panty hose.  · Wash your supporters and shorts after every wearing. · Do not share clothing, sports equipment, towels, or sheets to avoid spreading the fungi to other people. To prevent jock itch  · Put on socks before you put on underwear if you have athlete's foot. This action helps prevent the fungus on your feet from spreading to your groin. · Wash your workout clothes, underwear, socks, and towels after each use. · Keep your groin, inner thighs, and buttocks clean and dry, especially after you exercise and shower. · Use a powder on your groin, inner thighs, and buttocks to help keep these areas dry. · Do not borrow or lend clothing, sports equipment, towels, or sheets. · Wear slippers or sandals in locker rooms, showers, and public bathing areas. When should you call for help? Call your doctor now or seek immediate medical care if:    · You have signs of infection, such as:  ? Increased pain, swelling, warmth, or redness. ? Red streaks leading from the rash. ? Pus draining from the rash. ? A fever.    Watch closely for changes in your health, and be sure to contact your doctor if:    · You do not get better as expected. Where can you learn more? Go to http://camille-joe.info/. Enter G303 in the search box to learn more about \"Jock Itch: Care Instructions. \"  Current as of: April 1, 2019  Content Version: 12.2  © 7833-7726 [employer]. Care instructions adapted under license by [employer] (which disclaims liability or warranty for this information). If you have questions about a medical condition or this instruction, always ask your healthcare professional. [place] [address] any warranty or liability for your use of this information.

## 2019-11-04 ENCOUNTER — TELEPHONE (OUTPATIENT)
Dept: FAMILY MEDICINE CLINIC | Age: 84
End: 2019-11-04

## 2019-11-04 RX ORDER — TERBINAFINE HYDROCHLORIDE 250 MG/1
250 TABLET ORAL DAILY
Qty: 7 TAB | Refills: 0 | Status: SHIPPED | OUTPATIENT
Start: 2019-11-04 | End: 2019-11-11

## 2019-11-04 NOTE — TELEPHONE ENCOUNTER
Rx for oral anti fungal sent to pharmacy on file for pt to take once daily for 7 days. Okay to continue using cream prescribed with it. Can also take benadryl as needed for itching.

## 2019-11-04 NOTE — TELEPHONE ENCOUNTER
Patient states his itching is no better. Can you change to something else?  Please advise    Deo on file

## 2019-11-07 DIAGNOSIS — F51.01 PRIMARY INSOMNIA: Primary | ICD-10-CM

## 2019-11-11 RX ORDER — LORAZEPAM 2 MG/1
1 TABLET ORAL
Qty: 30 TAB | Refills: 5 | Status: SHIPPED | OUTPATIENT
Start: 2019-11-11 | End: 2020-01-14 | Stop reason: SDUPTHER

## 2019-11-19 DIAGNOSIS — I10 PRIMARY HYPERTENSION: ICD-10-CM

## 2019-11-19 RX ORDER — LISINOPRIL 20 MG/1
TABLET ORAL
Qty: 90 TAB | Refills: 0 | Status: SHIPPED | OUTPATIENT
Start: 2019-11-19 | End: 2020-03-23 | Stop reason: SDUPTHER

## 2019-11-20 ENCOUNTER — OFFICE VISIT (OUTPATIENT)
Dept: FAMILY MEDICINE CLINIC | Age: 84
End: 2019-11-20

## 2019-11-20 ENCOUNTER — HOSPITAL ENCOUNTER (OUTPATIENT)
Dept: LAB | Age: 84
Discharge: HOME | End: 2019-11-20

## 2019-11-20 VITALS
HEIGHT: 64 IN | SYSTOLIC BLOOD PRESSURE: 118 MMHG | OXYGEN SATURATION: 98 % | TEMPERATURE: 97.9 F | RESPIRATION RATE: 20 BRPM | HEART RATE: 77 BPM | DIASTOLIC BLOOD PRESSURE: 71 MMHG | WEIGHT: 158 LBS | BODY MASS INDEX: 26.98 KG/M2

## 2019-11-20 DIAGNOSIS — I10 PRIMARY HYPERTENSION: ICD-10-CM

## 2019-11-20 DIAGNOSIS — E78.2 MIXED HYPERLIPIDEMIA: ICD-10-CM

## 2019-11-20 DIAGNOSIS — E87.1 ACUTE HYPONATREMIA: ICD-10-CM

## 2019-11-20 DIAGNOSIS — E03.9 ACQUIRED HYPOTHYROIDISM: ICD-10-CM

## 2019-11-20 DIAGNOSIS — E03.9 ACQUIRED HYPOTHYROIDISM: Primary | ICD-10-CM

## 2019-11-20 LAB
ALBUMIN SERPL-MCNC: 3.9 G/DL (ref 3.5–5)
ALBUMIN/GLOB SERPL: 1.3 {RATIO} (ref 1.1–2.2)
ALP SERPL-CCNC: 91 U/L (ref 45–117)
ALT SERPL-CCNC: 15 U/L (ref 12–78)
ANION GAP SERPL CALC-SCNC: 5 MMOL/L (ref 5–15)
AST SERPL-CCNC: 11 U/L (ref 15–37)
BILIRUB SERPL-MCNC: 0.3 MG/DL (ref 0.2–1)
BUN SERPL-MCNC: 32 MG/DL (ref 6–20)
BUN/CREAT SERPL: 23 (ref 12–20)
CALCIUM SERPL-MCNC: 9 MG/DL (ref 8.5–10.1)
CHLORIDE SERPL-SCNC: 102 MMOL/L (ref 97–108)
CHOLEST SERPL-MCNC: 204 MG/DL
CO2 SERPL-SCNC: 27 MMOL/L (ref 21–32)
COMMENT, HOLDF: NORMAL
CREAT SERPL-MCNC: 1.38 MG/DL (ref 0.7–1.3)
GLOBULIN SER CALC-MCNC: 3.1 G/DL (ref 2–4)
GLUCOSE SERPL-MCNC: 84 MG/DL (ref 65–100)
HDLC SERPL-MCNC: 31 MG/DL
HDLC SERPL: 6.6 {RATIO} (ref 0–5)
LDLC SERPL CALC-MCNC: 124.2 MG/DL (ref 0–100)
LIPID PROFILE,FLP: ABNORMAL
POTASSIUM SERPL-SCNC: 4.4 MMOL/L (ref 3.5–5.1)
PROT SERPL-MCNC: 7 G/DL (ref 6.4–8.2)
SAMPLES BEING HELD,HOLD: NORMAL
SODIUM SERPL-SCNC: 134 MMOL/L (ref 136–145)
TRIGL SERPL-MCNC: 244 MG/DL (ref ?–150)
TSH SERPL DL<=0.05 MIU/L-ACNC: 1.39 UIU/ML (ref 0.36–3.74)
VLDLC SERPL CALC-MCNC: 48.8 MG/DL

## 2019-11-20 RX ORDER — DICYCLOMINE HYDROCHLORIDE 10 MG/1
10 CAPSULE ORAL
Qty: 180 CAP | Refills: 1 | Status: SHIPPED | OUTPATIENT
Start: 2019-11-20 | End: 2020-02-03 | Stop reason: SDUPTHER

## 2019-11-20 RX ORDER — LEVOTHYROXINE SODIUM 112 UG/1
112 TABLET ORAL
Qty: 90 TAB | Refills: 1 | Status: SHIPPED | OUTPATIENT
Start: 2019-11-20 | End: 2020-01-13 | Stop reason: SDUPTHER

## 2019-11-20 RX ORDER — CLOTRIMAZOLE AND BETAMETHASONE DIPROPIONATE 10; .64 MG/G; MG/G
CREAM TOPICAL 2 TIMES DAILY
Qty: 45 G | Refills: 1 | Status: SHIPPED | OUTPATIENT
Start: 2019-11-20 | End: 2022-04-04

## 2019-11-20 RX ORDER — LEVOTHYROXINE SODIUM 112 UG/1
TABLET ORAL
COMMUNITY
End: 2019-11-20 | Stop reason: SDUPTHER

## 2019-11-20 RX ORDER — ONDANSETRON 4 MG/1
4 TABLET, ORALLY DISINTEGRATING ORAL
Qty: 45 TAB | Refills: 1 | Status: SHIPPED | OUTPATIENT
Start: 2019-11-20 | End: 2020-01-14

## 2019-11-20 NOTE — PROGRESS NOTES
Patient here for follow up and med refills. 1. Have you been to the ER, urgent care clinic since your last visit? Hospitalized since your last visit? No    2. Have you seen or consulted any other health care providers outside of the [address] [place] [address] since your last visit? Include any pap smears or colon screening. No       Chief Complaint   Patient presents with    Medication Refill     He is a 80 y.o. male who presents for evalution. Reviewed PmHx, RxHx, FmHx, SocHx, AllgHx and updated and dated in the chart. Patient Active Problem List    Diagnosis    Acute hyponatremia    [facility] of recurrent [facility] disorder [facility] [address]    Stress incontinence of urine    Diverticulitis of large intestine with perforation and abscess without bleeding    Ischemic colitis ([facility] [address]    Advance care planning     Has a Lw      Insomnia    Primary hypertension    Acquired hypothyroidism    Mixed hyperlipidemia    Coronary atherosclerosis of native coronary artery    Cancer [facility])     prostate         Review of Systems - negative except as listed above in the HPI    Objective:     Vitals:    11/20/19 0737   BP: 118/71   Pulse: 77   Resp: 20   Temp: 97.9 °F (36.6 °C)   SpO2: 98%   Weight: 158 lb (71.7 kg)   Height: 5' 4\" (1.626 m)     Physical Examination: General appearance - alert, well appearing, and in no distress  Neck - supple, no significant adenopathy  Chest - clear to auscultation, no wheezes, rales or rhonchi, symmetric air entry  Heart - normal rate, regular rhythm, normal S1, S2, no murmurs, rubs, clicks or gallops  Abdomen - soft, nontender, nondistended, no masses or organomegaly  Extremities - peripheral pulses normal, no pedal edema, no clubbing or cyanosis      Assessment/ Plan:   Diagnoses and all orders for this visit:    1. Acquired hypothyroidism  -     levothyroxine (SYNTHROID) 112 mcg tablet; Take 1 Tab by mouth Daily (before breakfast). -     TSH 3RD GENERATION; Future    2.  Mixed hyperlipidemia  -     METABOLIC PANEL, COMPREHENSIVE; Future  -     LIPID PANEL; Future    3. Primary hypertension  -     METABOLIC PANEL, COMPREHENSIVE; Future  -at goal    4. Acute hyponatremia  -     METABOLIC PANEL, COMPREHENSIVE; Future    Other orders  -     dicyclomine (BENTYL) 10 mg capsule; Take 1 Cap by mouth every eight (8) hours as needed (ibs). -     ondansetron (ZOFRAN ODT) 4 mg disintegrating tablet; Take 1 Tab by mouth every eight (8) hours as needed for Nausea. -     clotrimazole-betamethasone (LOTRISONE) topical cream; Apply  to affected area two (2) times a day. I have discussed the diagnosis with the patient and the intended plan as seen in the above orders. The patient understands and agrees with the plan. The patient has received an after-visit summary and questions were answered concerning future plans. Medication Side Effects and Warnings were discussed with patient  Patient Labs were reviewed and or requested:  Patient Past Records were reviewed and or requested    Becky Schuler M.D. There are no Patient Instructions on file for this visit.

## 2019-11-21 NOTE — PROGRESS NOTES
After reviewing your labs, I believe they are within normal  limits for your age. Keep working hard on diet and taking your medications that are prescribed. If you have any acute care needs and are having trouble getting an appointment. .. please send me a   [facility] message or have the  send me a message. Have a blessed day and  be kind  to others! If you have any questions, feel free to email thru [facility], or give us   a call back at [phone]. Opal Callahan M.D.   Good Help to Those in Need  \"You maybe whatever you resolve to be\"

## 2020-01-13 DIAGNOSIS — E03.9 ACQUIRED HYPOTHYROIDISM: ICD-10-CM

## 2020-01-13 RX ORDER — LEVOTHYROXINE SODIUM 112 UG/1
112 TABLET ORAL
Qty: 90 TAB | Refills: 1 | Status: SHIPPED | OUTPATIENT
Start: 2020-01-13 | End: 2020-08-14 | Stop reason: SDUPTHER

## 2020-01-14 ENCOUNTER — OFFICE VISIT (OUTPATIENT)
Dept: FAMILY MEDICINE CLINIC | Age: 85
End: 2020-01-14

## 2020-01-14 VITALS
DIASTOLIC BLOOD PRESSURE: 71 MMHG | BODY MASS INDEX: 26.34 KG/M2 | HEIGHT: 64 IN | WEIGHT: 154.3 LBS | TEMPERATURE: 97.7 F | SYSTOLIC BLOOD PRESSURE: 125 MMHG | RESPIRATION RATE: 16 BRPM | HEART RATE: 65 BPM | OXYGEN SATURATION: 98 %

## 2020-01-14 DIAGNOSIS — R19.7 DIARRHEA, UNSPECIFIED TYPE: ICD-10-CM

## 2020-01-14 DIAGNOSIS — F51.01 PRIMARY INSOMNIA: ICD-10-CM

## 2020-01-14 DIAGNOSIS — R11.0 NAUSEA: Primary | ICD-10-CM

## 2020-01-14 DIAGNOSIS — I10 ESSENTIAL HYPERTENSION: ICD-10-CM

## 2020-01-14 RX ORDER — LORAZEPAM 2 MG/1
1 TABLET ORAL
Qty: 30 TAB | Refills: 5 | Status: SHIPPED | OUTPATIENT
Start: 2020-01-14 | End: 2020-03-05

## 2020-01-14 RX ORDER — ONDANSETRON 8 MG/1
8 TABLET, ORALLY DISINTEGRATING ORAL
Qty: 30 TAB | Refills: 2 | Status: SHIPPED | OUTPATIENT
Start: 2020-01-14 | End: 2021-07-06 | Stop reason: SDUPTHER

## 2020-01-14 NOTE — PROGRESS NOTES
Chief Complaint   Patient presents with    Nausea     X 2 weeks: Cramps, no vomiting- taking Dicyclomine daily    Dizziness     eyes feel like sand in them    Medication Refill     1. Have you been to the ER, urgent care clinic since your last visit? Hospitalized since your last visit? No    2. Have you seen or consulted any other health care providers outside of the [address] [place] [address] since your last visit? Include any pap smears or colon screening. No        Chief Complaint   Patient presents with    Nausea     X 2 weeks: Cramps, no vomiting- taking Dicyclomine daily    Dizziness     eyes feel like sand in them    Medication Refill     He is a 80 y.o. male who presents for evalution. Reviewed PmHx, RxHx, FmHx, SocHx, AllgHx and updated and dated in the chart.     Patient Active Problem List    Diagnosis    Acute hyponatremia    Episode of recurrent major depressive disorder ([facility] [address]    Stress incontinence of urine    Diverticulitis of large intestine with perforation and abscess without bleeding    Ischemic colitis ([facility] [address]    Advance care planning     Has a Lw      Insomnia    Primary hypertension    Acquired hypothyroidism    Mixed hyperlipidemia    Coronary atherosclerosis of native coronary artery    Cancer [facility])     prostate         Review of Systems - negative except as listed above in the HPI    Objective:     Vitals:    01/14/20 1143   BP: 125/71   Pulse: 65   Resp: 16   Temp: 97.7 °F (36.5 °C)   TempSrc: Oral   SpO2: 98%   Weight: 154 lb 4.8 oz (70 kg)   Height: 5' 4\" (1.626 m)     Physical Examination: General appearance - alert, well appearing, and in no distress  Mouth - mucous membranes moist, pharynx normal without lesions  Neck - supple, no significant adenopathy  Chest - clear to auscultation, no wheezes, rales or rhonchi, symmetric air entry  Heart - normal rate, regular rhythm, normal S1, S2, no murmurs, rubs, clicks or gallops  Abdomen - soft, nontender, nondistended, no masses or organomegaly  Extremities - peripheral pulses normal, no pedal edema, no clubbing or cyanosis    Assessment/ Plan:   Diagnoses and all orders for this visit:    1. Nausea  -add Zofran    2. Primary insomnia  -     LORazepam (ATIVAN) 2 mg tablet; Take 0.5 Tabs by mouth nightly. Max Daily Amount: 1 mg.  -cont rx    3. Diarrhea, unspecified type  -stable    4. Essential hypertension  -at goal    Other orders  -     ondansetron (ZOFRAN ODT) 8 mg disintegrating tablet; Take 1 Tab by mouth every eight (8) hours as needed for Nausea or Vomiting. Follow-up and Dispositions    · Return if symptoms worsen or fail to improve. I have discussed the diagnosis with the patient and the intended plan as seen in the above orders. The patient understands and agrees with the plan. The patient has received an after-visit summary and questions were answered concerning future plans. Medication Side Effects and Warnings were discussed with patient  Patient Labs were reviewed and or requested:  Patient Past Records were reviewed and or requested    Mireya Rivera M.D. There are no Patient Instructions on file for this visit.

## 2020-02-03 ENCOUNTER — OFFICE VISIT (OUTPATIENT)
Dept: FAMILY MEDICINE CLINIC | Age: 85
End: 2020-02-03

## 2020-02-03 VITALS
OXYGEN SATURATION: 97 % | TEMPERATURE: 98.6 F | HEART RATE: 78 BPM | BODY MASS INDEX: 26.63 KG/M2 | HEIGHT: 64 IN | RESPIRATION RATE: 20 BRPM | WEIGHT: 156 LBS | DIASTOLIC BLOOD PRESSURE: 85 MMHG | SYSTOLIC BLOOD PRESSURE: 133 MMHG

## 2020-02-03 DIAGNOSIS — I10 ESSENTIAL HYPERTENSION: ICD-10-CM

## 2020-02-03 DIAGNOSIS — R11.0 NAUSEA: Primary | ICD-10-CM

## 2020-02-03 RX ORDER — DICYCLOMINE HYDROCHLORIDE 10 MG/1
10 CAPSULE ORAL
Qty: 180 CAP | Refills: 1 | Status: SHIPPED | OUTPATIENT
Start: 2020-02-03 | End: 2021-02-11 | Stop reason: SDUPTHER

## 2020-02-03 NOTE — PROGRESS NOTES
Fast 24 Pass    Marisela Pepe  2/3/2020    Patient Active Problem List    Diagnosis    Acute hyponatremia    Episode of recurrent major depressive disorder ([address]    Stress incontinence of urine    Diverticulitis of large intestine with perforation and abscess without bleeding    Ischemic colitis ([address]    Advance care planning     Has a Lw      Insomnia    Primary hypertension    Acquired hypothyroidism    Mixed hyperlipidemia    Coronary atherosclerosis of native coronary artery    Cancer [facility])     prostate         You have been scheduled for our \"FAST PASS\" visit today! FAST PASS Interview:    1. What is your current issue for today's visit? Stomach archness x 3 weeks    2. What is your worse symptom abd dull ache, sometimes after certain foods    3. How long have you had these symptoms? 3 weeks  4. Medications tried: Align once daily , nausea medication    6. What pharmacy do you use for your \"FAST PASS\" visit today? Updated in chart. 7.  While we have a few moments, I would like to review your Health Maintenance needs. Health Maintenance Due   Topic Date Due    Shingrix Vaccine Age 49> (1 of 2) 11/13/1980    Pneumococcal 65+ years (2 of 2 - PPSV23) 07/08/2018    GLAUCOMA SCREENING Q2Y  04/17/2019    MEDICARE YEARLY EXAM  01/25/2020        8. Do you need to make any appointments for your long term health issues?   no      Chief Complaint   Patient presents with    Abdominal Pain     pain x 3 week     He is a 80 y.o. male who presents for evalution. Reviewed PmHx, RxHx, FmHx, SocHx, AllgHx and updated and dated in the chart.     Patient Active Problem List    Diagnosis    Acute hyponatremia    Episode of recurrent major depressive disorder ([address]    Stress incontinence of urine    Diverticulitis of large intestine with perforation and abscess without bleeding    Ischemic colitis ([address]    Advance care planning     Has a Lw      Insomnia    Primary hypertension    Acquired hypothyroidism    Mixed hyperlipidemia    Coronary atherosclerosis of native coronary artery    Cancer [facility])     prostate         Review of Systems - negative except as listed above in the HPI    Objective:     Vitals:    02/03/20 1440   BP: 133/85   Pulse: 78   Resp: 20   Temp: 98.6 °F (37 °C)   SpO2: 97%   Weight: 156 lb (70.8 kg)   Height: 5' 4\" (1.626 m)     Physical Examination: General appearance - alert, well appearing, and in no distress  Neck - supple, no significant adenopathy  Chest - clear to auscultation, no wheezes, rales or rhonchi, symmetric air entry  Heart - normal rate, regular rhythm, normal S1, S2, no murmurs, rubs, clicks or gallops  Abdomen - soft, nontender, nondistended, no masses or organomegaly    Assessment/ Plan:   Diagnoses and all orders for this visit:    1. Nausea  -     REFERRAL TO GASTROENTEROLOGY    2. Essential hypertension  -AT GOAL    Other orders  -     dicyclomine (BENTYL) 10 mg capsule; Take 1 Cap by mouth every eight (8) hours as needed (ibs). I have discussed the diagnosis with the patient and the intended plan as seen in the above orders. The patient understands and agrees with the plan. The patient has received an after-visit summary and questions were answered concerning future plans. Medication Side Effects and Warnings were discussed with patient  Patient Labs were reviewed and or requested:  Patient Past Records were reviewed and or requested    Carlton Hawk M.D. There are no Patient Instructions on file for this visit.

## 2020-02-13 ENCOUNTER — OFFICE VISIT (OUTPATIENT)
Dept: FAMILY MEDICINE CLINIC | Age: 85
End: 2020-02-13

## 2020-02-13 VITALS
WEIGHT: 158 LBS | TEMPERATURE: 97.5 F | SYSTOLIC BLOOD PRESSURE: 126 MMHG | HEART RATE: 86 BPM | RESPIRATION RATE: 18 BRPM | DIASTOLIC BLOOD PRESSURE: 68 MMHG | BODY MASS INDEX: 26.98 KG/M2 | OXYGEN SATURATION: 96 % | HEIGHT: 64 IN

## 2020-02-13 DIAGNOSIS — H61.23 CERUMINOSIS, BILATERAL: Primary | ICD-10-CM

## 2020-02-13 NOTE — PROGRESS NOTES
Yelena Marcelo is a 80 y.o. male   Chief Complaint   Patient presents with    Wax in Ear    pt states he can't hear out of his L ear and feels like it is clogged with wax, similar with R but can hear. Using otc drops. States this is a chronic issue and needs ears flushed from time to time. he is a 80y.o. year old male who presents for evalution. Reviewed PmHx, RxHx, FmHx, SocHx, AllgHx and updated and dated in the chart. Review of Systems - negative except as listed above in the HPI    Objective:     Vitals:    02/13/20 0854   BP: 126/68   Pulse: 86   Resp: 18   Temp: 97.5 °F (36.4 °C)   TempSrc: Oral   SpO2: 96%   Weight: 158 lb (71.7 kg)   Height: 5' 4\" (1.626 m)       Current Outpatient Medications   Medication Sig    dicyclomine (BENTYL) 10 mg capsule Take 1 Cap by mouth every eight (8) hours as needed (ibs).  LORazepam (ATIVAN) 2 mg tablet Take 0.5 Tabs by mouth nightly. Max Daily Amount: 1 mg.  levothyroxine (SYNTHROID) 112 mcg tablet Take 1 Tab by mouth Daily (before breakfast).  clotrimazole-betamethasone (LOTRISONE) topical cream Apply  to affected area two (2) times a day.  lisinopril (PRINIVIL, ZESTRIL) 20 mg tablet TAKE 1 TABLET BY MOUTH DAILY    Bifidobacterium Infantis (ALIGN) 4 mg cap Take 4 mg by mouth daily.  acetaminophen (TYLENOL) 325 mg tablet Take 325 mg by mouth every four (4) hours as needed for Pain.  ondansetron (ZOFRAN ODT) 8 mg disintegrating tablet Take 1 Tab by mouth every eight (8) hours as needed for Nausea or Vomiting. No current facility-administered medications for this visit.         Physical Examination: General appearance - alert, well appearing, and in no distress  Ears - ceruminosis noted, cerumen removed  Chest - clear to auscultation, no wheezes, rales or rhonchi, symmetric air entry  Heart - normal rate, regular rhythm, normal S1, S2, no murmurs, rubs, clicks or gallops      Assessment/ Plan:   Diagnoses and all orders for this visit: 1. Ceruminosis, bilateral  Flushed with complete relief by LPN     Follow-up and Dispositions    · Return if symptoms worsen or fail to improve. I have discussed the diagnosis with the patient and the intended plan as seen in the above orders. The patient has received an after-visit summary and questions were answered concerning future plans. Pt conveyed understanding of plan.     Medication Side Effects and Warnings were discussed with patient      Camelia Gomes DO

## 2020-02-13 NOTE — PROGRESS NOTES
Chief Complaint   Patient presents with    Wax in Ear     Patient presents in office today with c/o wax in both ears. States that his left ear in completely clogged. Denies any pain. No other concerns. 1. Have you been to the ER, urgent care clinic since your last visit? Hospitalized since your last visit? No    2. Have you seen or consulted any other health care providers outside of the [address] [place] [address] since your last visit? Include any pap smears or colon screening.  No    Learning Assessment 1/16/2018   PRIMARY LEARNER Patient   HIGHEST LEVEL OF EDUCATION - PRIMARY LEARNER  2 YEARS OF COLLEGE   BARRIERS PRIMARY LEARNER NONE   CO-LEARNER CAREGIVER No   PRIMARY LANGUAGE ENGLISH   LEARNER PREFERENCE PRIMARY DEMONSTRATION   ANSWERED BY patient   RELATIONSHIP SELF

## 2020-02-13 NOTE — PATIENT INSTRUCTIONS
A Healthy Lifestyle: Care Instructions  Your Care Instructions    A healthy lifestyle can help you feel good, stay at a healthy weight, and have plenty of energy for both work and play. A healthy lifestyle is something you can share with your whole family. A healthy lifestyle also can lower your risk for serious health problems, such as high blood pressure, heart disease, and diabetes. You can follow a few steps listed below to improve your health and the health of your family. Follow-up care is a key part of your treatment and safety. Be sure to make and go to all appointments, and call your doctor if you are having problems. It's also a good idea to know your test results and keep a list of the medicines you take. How can you care for yourself at home? · Do not eat too much sugar, fat, or fast foods. You can still have dessert and treats now and then. The goal is moderation. · Start small to improve your eating habits. Pay attention to portion sizes, drink less juice and soda pop, and eat more fruits and vegetables. ? Eat a healthy amount of food. A 3-ounce serving of meat, for example, is about the size of a deck of cards. Fill the rest of your plate with vegetables and whole grains. ? Limit the amount of soda and sports drinks you have every day. Drink more water when you are thirsty. ? Eat at least 5 servings of fruits and vegetables every day. It may seem like a lot, but it is not hard to reach this goal. A serving or helping is 1 piece of fruit, 1 cup of vegetables, or 2 cups of leafy, raw vegetables. Have an apple or some carrot sticks as an afternoon snack instead of a candy bar. Try to have fruits and/or vegetables at every meal.  · Make exercise part of your daily routine. You may want to start with simple activities, such as walking, bicycling, or slow swimming. Try to be active 30 to 60 minutes every day. You do not need to do all 30 to 60 minutes all at once.  For example, you can exercise 3 times a day for 10 or 20 minutes. Moderate exercise is safe for most people, but it is always a good idea to talk to your doctor before starting an exercise program.  · Keep moving. [employer] the lawn, work in the garden, or [employer]. Take the stairs instead of the elevator at work. · If you smoke, quit. People who smoke have an increased risk for heart attack, stroke, cancer, and other lung illnesses. Quitting is hard, but there are ways to boost your chance of quitting tobacco for good. ? Use nicotine gum, patches, or lozenges. ? Ask your doctor about stop-smoking programs and medicines. ? Keep trying. In addition to reducing your risk of diseases in the future, you will notice some benefits soon after you stop using tobacco. If you have shortness of breath or asthma symptoms, they will likely get better within a few weeks after you quit. · Limit how much alcohol you drink. Moderate amounts of alcohol (up to 2 drinks a day for men, 1 drink a day for women) are okay. But drinking too much can lead to liver problems, high blood pressure, and other health problems. Family health  If you have a family, there are many things you can do together to improve your health. · Eat meals together as a family as often as possible. · Eat healthy foods. This includes fruits, vegetables, lean meats and dairy, and whole grains. · Include your family in your fitness plan. Most people think of activities such as jogging or tennis as the way to fitness, but there are many ways you and your family can be more active. Anything that makes you breathe hard and gets your heart pumping is exercise. Here are some tips:  ? Walk to do errands or to take your child to school or the bus.  ? Go for a family bike ride after dinner instead of watching TV. Where can you learn more? Go to http://camille-joe.info/. Enter E843 in the search box to learn more about \"A Healthy Lifestyle: Care Instructions. \"  Current as of: May 28, 2019  Content Version: 12.2  © 0089-4566 [employer]. Care instructions adapted under license by [employer] (which disclaims liability or warranty for this information). If you have questions about a medical condition or this instruction, always ask your healthcare professional. [address] any warranty or liability for your use of this information.

## 2020-03-02 ENCOUNTER — TELEPHONE (OUTPATIENT)
Dept: FAMILY MEDICINE CLINIC | Age: 85
End: 2020-03-02

## 2020-03-02 NOTE — TELEPHONE ENCOUNTER
----- Message from Ronel Lemus sent at 3/2/2020  2:26 PM EST -----  Regarding: Dr. Castañeda Areas: [phone]  Caller's first and last name: David Baeza, [place], [employer]  Reason for call: Caller stated the pt has two prescriptions for \"Lorazepam\". One prescription is for 1mg and the other is for 2mg but only taking ½ at a time. Caller is wondering if the pt should have both prescriptions. Caller also stated the pt attempts to fill his prescriptions for \"Lorazepam\" early frequently.   Callback required yes/no and why: Yes  Best contact number(s): [phone]  Details to clarify the request: N/A

## 2020-03-05 RX ORDER — LORAZEPAM 1 MG/1
1 TABLET ORAL EVERY EVENING
COMMUNITY
End: 2020-04-13

## 2020-03-05 NOTE — TELEPHONE ENCOUNTER
Called and spoke to [facility]. Per pharmacist:(Karin) Patient unable to split tabs. Lorazepam 1 mg take 1 tab nightly was filled.   Order changed on med list.

## 2020-03-23 DIAGNOSIS — I10 PRIMARY HYPERTENSION: ICD-10-CM

## 2020-03-23 RX ORDER — LISINOPRIL 20 MG/1
TABLET ORAL
Qty: 90 TAB | Refills: 1 | Status: SHIPPED | OUTPATIENT
Start: 2020-03-23 | End: 2020-09-27

## 2020-04-12 DIAGNOSIS — F51.01 PRIMARY INSOMNIA: Primary | ICD-10-CM

## 2020-04-13 RX ORDER — LORAZEPAM 1 MG/1
TABLET ORAL
Qty: 30 TAB | Refills: 1 | Status: SHIPPED | OUTPATIENT
Start: 2020-04-13 | End: 2020-06-16

## 2020-06-12 ENCOUNTER — TELEPHONE (OUTPATIENT)
Dept: FAMILY MEDICINE CLINIC | Age: 85
End: 2020-06-12

## 2020-06-12 NOTE — TELEPHONE ENCOUNTER
Patient is having lower back pain and stiffness. When walking he feels as if he might fall. He doesn't have a smart phone, computer or [address]  Patient's phone: [phone]

## 2020-06-16 DIAGNOSIS — F51.01 PRIMARY INSOMNIA: ICD-10-CM

## 2020-06-16 RX ORDER — LORAZEPAM 1 MG/1
TABLET ORAL
Qty: 30 TAB | Refills: 5 | Status: SHIPPED | OUTPATIENT
Start: 2020-06-16 | End: 2020-12-22 | Stop reason: SDUPTHER

## 2020-06-17 ENCOUNTER — OFFICE VISIT (OUTPATIENT)
Dept: FAMILY MEDICINE CLINIC | Age: 85
End: 2020-06-17

## 2020-06-17 VITALS
TEMPERATURE: 98.2 F | HEIGHT: 64 IN | WEIGHT: 155 LBS | RESPIRATION RATE: 18 BRPM | OXYGEN SATURATION: 98 % | HEART RATE: 81 BPM | BODY MASS INDEX: 26.46 KG/M2 | SYSTOLIC BLOOD PRESSURE: 142 MMHG | DIASTOLIC BLOOD PRESSURE: 66 MMHG

## 2020-06-17 DIAGNOSIS — M54.50 ACUTE LEFT-SIDED LOW BACK PAIN WITHOUT SCIATICA: Primary | ICD-10-CM

## 2020-06-17 RX ORDER — DICLOFENAC SODIUM 75 MG/1
75 TABLET, DELAYED RELEASE ORAL
Qty: 60 TAB | Refills: 2 | Status: SHIPPED | OUTPATIENT
Start: 2020-06-17 | End: 2021-02-19 | Stop reason: SDUPTHER

## 2020-06-17 NOTE — PROGRESS NOTES
Chief Complaint   Patient presents with    Back Pain     Edgardo Judith is a 80 y.o. male who presents for evaluation. Patient presents in office today with c/o on going back pain for the last \"month or so\"  Rates pain between 0 to 5 out of 10 most day. Has tried tylenol, thinks it helps \"a little\"  States he also bought back brace from [employer] and it did not help pain, states it was too bulky. Denies preceding trauma  States back is \"more sore than pain\"  Hurt back in highschool and states this feels different, more like \"stiffness that grabs me\"  Pain is worse with laying down and standing up   Denies numbness or tingling in legs  Doesn't want to take medication if possible. States he tries to walk daily and stay active. Reviewed PmHx, RxHx, FmHx, SocHx, AllgHx and updated and dated in the chart.     Patient Active Problem List    Diagnosis    Acute hyponatremia    Episode of recurrent [facility] disorder [facility]    Stress incontinence of urine    Diverticulitis of large intestine with perforation and abscess without bleeding    Ischemic colitis ([address]    Advance care planning     Has a Lw      Insomnia    Primary hypertension    Acquired hypothyroidism    Mixed hyperlipidemia    Coronary atherosclerosis of native coronary artery    Cancer ([facility] [address]     prostate         Review of Systems - negative except as listed above in the HPI    Objective:     Vitals:    06/17/20 1348   BP: 142/66   Pulse: 81   Resp: 18   Temp: 98.2 °F (36.8 °C)   TempSrc: Oral   SpO2: 98%   Weight: 155 lb (70.3 kg)   Height: 5' 4\" (1.626 m)     Physical Examination: General appearance - alert, well appearing, and in no distress  Mental status - alert, oriented to person, place, and time  Chest - clear to auscultation, no wheezes, rales or rhonchi, symmetric air entry  Heart - normal rate, regular rhythm, normal S1, S2, no murmurs, rubs, clicks or gallops  Abdomen - soft, nontender, nondistended, no masses or organomegaly  Back exam - full range of motion, no tenderness, palpable spasm or pain on motion  Extremities - peripheral pulses normal, no pedal edema, no clubbing or cyanosis  Skin - normal coloration and turgor, no rashes, no suspicious skin lesions noted    Assessment/ Plan:   Diagnoses and all orders for this visit:    1. Acute left-sided low back pain without sciatica  -     REFERRAL TO PHYSICAL THERAPY  -     diclofenac EC (VOLTAREN) 75 mg EC tablet; Take 1 Tab by mouth two (2) times daily as needed for Pain for up to 30 days. - New Rx, advised on use and SE/ADRs  - F/U PRN       Follow-up and Dispositions    · Return if symptoms worsen or fail to improve. I have discussed the diagnosis with the patient and the intended plan as seen in the above orders. The patient understands and agrees with the plan. The patient has received an after-visit summary and questions were answered concerning future plans. Medication Side Effects and Warnings were discussed with patient  Patient Labs were reviewed and or requested:  Patient Past Records were reviewed and or requested    Prem Hannah NP  [address] [facility]    There are no Patient Instructions on file for this visit.

## 2020-06-17 NOTE — PROGRESS NOTES
Chief Complaint   Patient presents with    Back Pain     Patient presents in office today with c/o on going back pain. Not treating with anything. No other concerns. 1. Have you been to the ER, urgent care clinic since your last visit? Hospitalized since your last visit? No    2. Have you seen or consulted any other health care providers outside of the [address] [place] [address] since your last visit? Include any pap smears or colon screening.  No    Learning Assessment 1/16/2018   PRIMARY LEARNER Patient   HIGHEST LEVEL OF EDUCATION - PRIMARY LEARNER  2 YEARS OF COLLEGE   BARRIERS PRIMARY LEARNER NONE   CO-LEARNER CAREGIVER No   PRIMARY LANGUAGE ENGLISH   LEARNER PREFERENCE PRIMARY DEMONSTRATION   ANSWERED BY patient   RELATIONSHIP SELF

## 2020-06-24 ENCOUNTER — OFFICE VISIT (OUTPATIENT)
Dept: PRIMARY CARE CLINIC | Age: 85
End: 2020-06-24

## 2020-06-24 DIAGNOSIS — Z20.822 EXPOSURE TO COVID-19 VIRUS: ICD-10-CM

## 2020-06-24 DIAGNOSIS — I10 PRIMARY HYPERTENSION: Primary | ICD-10-CM

## 2020-06-24 NOTE — PROGRESS NOTES
Patient was seen in a Flu Clinic at [address] [place] [address]. The patient verbally consented to being treated and billed for this visit    **  He is a 80 y.o. male who presents for evalution. Reviewed PmHx, RxHx, FmHx, SocHx, AllgHx and updated and dated in the chart. Patient Active Problem List    Diagnosis    Acute hyponatremia    [facility] disorder ([facility] [address]    Stress incontinence of urine    Diverticulitis of large intestine with perforation and abscess without bleeding    Ischemic colitis ([facility] [address] [facility]    Advance care planning     Has a Lw      Insomnia    Primary hypertension    Acquired hypothyroidism    Mixed hyperlipidemia    Coronary atherosclerosis of native coronary artery    Cancer [facility])     prostate         Review of Systems - negative except as listed above in the HPI    Objective: There were no vitals filed for this visit. Physical Examination: General appearance - alert, well appearing, and in no distress    Assessment/ Plan:   Diagnoses and all orders for this visit:    1. Primary hypertension  -at goal at home    2. Exposure to COVID-19 virus  -     NOVEL CORONAVIRUS (COVID-19); Future         Patient is presumed covid-19 positive based on criteria and testing performed and given mask today. See scanned note for history, vital details and testing results  There is no evidence of respiratory compromise  Discussed with patient to isolate for 14 days and gave CDC handout on proper isolation   Gave work note if applicable  Discussed with patient to call 911 or go to ED with any inc respiratory sxs        I have discussed the diagnosis with the patient and the intended plan as seen in the above orders. The patient understands and agrees with the plan. The patient has received an after-visit summary and questions were answered concerning future plans.      Medication Side Effects and Warnings were discussed with patient  Patient Labs were reviewed and or requested:  Patient Past Records were reviewed and or requested    Anmol Patel M.D. There are no Patient Instructions on file for this visit.

## 2020-06-29 LAB — SARS-COV-2, NAA: NOT DETECTED

## 2020-07-09 ENCOUNTER — TELEPHONE (OUTPATIENT)
Dept: FAMILY MEDICINE CLINIC | Age: 85
End: 2020-07-09

## 2020-07-09 NOTE — TELEPHONE ENCOUNTER
Patient is having an ear ache for about a week with having a wax build up. He doesn't have a smart phone, computer or [address]  Patient's phone: [phone]

## 2020-08-06 ENCOUNTER — OFFICE VISIT (OUTPATIENT)
Dept: FAMILY MEDICINE CLINIC | Age: 85
End: 2020-08-06
Payer: MEDICARE

## 2020-08-06 VITALS
DIASTOLIC BLOOD PRESSURE: 71 MMHG | RESPIRATION RATE: 18 BRPM | TEMPERATURE: 97.7 F | OXYGEN SATURATION: 97 % | BODY MASS INDEX: 26.66 KG/M2 | HEART RATE: 73 BPM | SYSTOLIC BLOOD PRESSURE: 134 MMHG | WEIGHT: 155.3 LBS

## 2020-08-06 DIAGNOSIS — H69.83 EUSTACHIAN TUBE DYSFUNCTION, BILATERAL: Primary | ICD-10-CM

## 2020-08-06 PROCEDURE — 99213 OFFICE O/P EST LOW 20 MIN: CPT | Performed by: NURSE PRACTITIONER

## 2020-08-06 NOTE — PROGRESS NOTES
Chief Complaint   Patient presents with    Wax in Ear     patient is here for  possible wax in the ear     Wanda Scruggs is a 80 y.o. male who presents for evaluation. Would like ears checked for wax, states he has needed flush in the past but unsure if needs it today. Has been using Debrox weekly   C/o intermittent \"crackling and popping\" sensation in ears, R>L  Otherwise no complaints    Reviewed PmHx, RxHx, FmHx, SocHx, AllgHx and updated and dated in the chart. Patient Active Problem List    Diagnosis    Acute hyponatremia    Episode of recurrent major depressive disorder ([address]    Stress incontinence of urine    Diverticulitis of large intestine with perforation and abscess without bleeding    Ischemic colitis ([facility] [address]    Advance care planning     Has a Lw      Insomnia    Primary hypertension    Acquired hypothyroidism    Mixed hyperlipidemia    Coronary atherosclerosis of native coronary artery    Cancer [facility])     prostate         Review of Systems - negative except as listed above in the HPI    Objective:     Vitals:    08/06/20 1454   BP: 134/71   Pulse: 73   Resp: 18   Temp: 97.7 °F (36.5 °C)   TempSrc: Oral   SpO2: 97%   Weight: 155 lb 4.8 oz (70.4 kg)     Physical Examination: General appearance - alert, well appearing, and in no distress  Mental status - alert, oriented to person, place, and time  Eyes - pupils equal and reactive, extraocular eye movements intact  Ears - bilateral TM's and external ear canals normal, minimal cerumen present, removed w/ curette   Chest - clear to auscultation, no wheezes, rales or rhonchi, symmetric air entry  Heart - normal rate, regular rhythm, normal S1, S2, no murmurs, rubs, clicks or gallops    Assessment/ Plan:   Diagnoses and all orders for this visit:    1.  Eustachian tube dysfunction, bilateral  - Minimal cerumen present, removed w/ curette w/o difficulty  - Advised sensation is likely d/t allergies, offered to sent script for medication and pt declined, is happy his ears look good  - Will continue debrox weekly         Follow-up and Dispositions    · Return if symptoms worsen or fail to improve. I have discussed the diagnosis with the patient and the intended plan as seen in the above orders. The patient understands and agrees with the plan. The patient has received an after-visit summary and questions were answered concerning future plans. Medication Side Effects and Warnings were discussed with patient  Patient Labs were reviewed and or requested:  Patient Past Records were reviewed and or requested    Shane Culp NP  [address] [facility]    There are no Patient Instructions on file for this visit.

## 2020-08-06 NOTE — PROGRESS NOTES
Wanda Scruggs is a 80 y.o. male , id x 2(name and ). Reviewed record, history, and  medications. Chief Complaint   Patient presents with    Wax in Ear     patient is here for  possible wax in the ear       Vitals:    20 1454   BP: 134/71   Pulse: 73   Resp: 18   Temp: 97.7 °F (36.5 °C)   TempSrc: Oral   SpO2: 97%   Weight: 155 lb 4.8 oz (70.4 kg)   PainSc:   0 - No pain       Coordination of Care Questionnaire:   1) Have you been to an emergency room, urgent care, or hospitalized since your last visit?   no       2. Have seen or consulted any other health care provider since your last visit? NO      3 most recent PHQ Screens 2018   Little interest or pleasure in doing things Not at all   Feeling down, depressed, irritable, or hopeless Several days   Total Score PHQ 2 1   Trouble falling or staying asleep, or sleeping too much -   Feeling tired or having little energy -   Poor appetite, weight loss, or overeating -   Feeling bad about yourself - or that you are a failure or have let yourself or your family down -   Trouble concentrating on things such as school, work, reading, or watching TV -   Moving or speaking so slowly that other people could have noticed; or the opposite being so fidgety that others notice -   Thoughts of being better off dead, or hurting yourself in some way -   PHQ 9 Score -   How difficult have these problems made it for you to do your work, take care of your home and get along with others -       Patient is accompanied by self I have received verbal consent from Wanda Scruggs to discuss any/all medical information while they are present in the room.

## 2020-08-14 DIAGNOSIS — E03.9 ACQUIRED HYPOTHYROIDISM: ICD-10-CM

## 2020-08-14 RX ORDER — LEVOTHYROXINE SODIUM 112 UG/1
112 TABLET ORAL
Qty: 90 TAB | Refills: 1 | Status: SHIPPED | OUTPATIENT
Start: 2020-08-14 | End: 2021-02-11 | Stop reason: SDUPTHER

## 2020-09-27 DIAGNOSIS — I10 PRIMARY HYPERTENSION: ICD-10-CM

## 2020-09-27 RX ORDER — LISINOPRIL 20 MG/1
TABLET ORAL
Qty: 90 TAB | Refills: 1 | Status: SHIPPED | OUTPATIENT
Start: 2020-09-27 | End: 2021-04-06 | Stop reason: SDUPTHER

## 2020-12-22 DIAGNOSIS — F51.01 PRIMARY INSOMNIA: ICD-10-CM

## 2020-12-22 RX ORDER — LORAZEPAM 1 MG/1
TABLET ORAL
Qty: 30 TAB | Refills: 5 | Status: SHIPPED | OUTPATIENT
Start: 2020-12-22 | End: 2021-06-11 | Stop reason: SDUPTHER

## 2021-02-11 DIAGNOSIS — E03.9 ACQUIRED HYPOTHYROIDISM: ICD-10-CM

## 2021-02-12 RX ORDER — DICYCLOMINE HYDROCHLORIDE 10 MG/1
10 CAPSULE ORAL
Qty: 180 CAP | Refills: 1 | Status: SHIPPED | OUTPATIENT
Start: 2021-02-12 | End: 2022-04-04 | Stop reason: ALTCHOICE

## 2021-02-12 RX ORDER — LEVOTHYROXINE SODIUM 112 UG/1
112 TABLET ORAL
Qty: 90 TAB | Refills: 1 | Status: SHIPPED | OUTPATIENT
Start: 2021-02-12 | End: 2021-08-17

## 2021-02-12 NOTE — TELEPHONE ENCOUNTER
He cannot take diclofenac because of his kidney function. He can use diclofenac gel which is over-the-counter.

## 2021-02-12 NOTE — TELEPHONE ENCOUNTER
Called and spoke with patient. He states that he does not take the diclofenac. Nothing further needed.

## 2021-02-19 ENCOUNTER — TELEPHONE (OUTPATIENT)
Dept: FAMILY MEDICINE CLINIC | Age: 86
End: 2021-02-19

## 2021-02-19 DIAGNOSIS — M54.50 ACUTE LEFT-SIDED LOW BACK PAIN WITHOUT SCIATICA: ICD-10-CM

## 2021-02-19 RX ORDER — DICLOFENAC SODIUM 75 MG/1
75 TABLET, DELAYED RELEASE ORAL
Qty: 60 TAB | Refills: 2 | Status: SHIPPED | OUTPATIENT
Start: 2021-02-19

## 2021-03-11 ENCOUNTER — TELEPHONE (OUTPATIENT)
Dept: FAMILY MEDICINE CLINIC | Age: 86
End: 2021-03-11

## 2021-03-11 NOTE — TELEPHONE ENCOUNTER
Attempted return call for patient to schedule VV. [phone] Phone rings with no answer and no VM option.

## 2021-03-19 DIAGNOSIS — I10 PRIMARY HYPERTENSION: ICD-10-CM

## 2021-03-23 RX ORDER — LISINOPRIL 20 MG/1
TABLET ORAL
Qty: 90 TAB | Refills: 1 | OUTPATIENT
Start: 2021-03-23

## 2021-03-26 ENCOUNTER — OFFICE VISIT (OUTPATIENT)
Dept: FAMILY MEDICINE CLINIC | Age: 86
End: 2021-03-26
Payer: MEDICARE

## 2021-03-26 VITALS
BODY MASS INDEX: 25.86 KG/M2 | DIASTOLIC BLOOD PRESSURE: 60 MMHG | HEART RATE: 80 BPM | HEIGHT: 64 IN | TEMPERATURE: 98.6 F | SYSTOLIC BLOOD PRESSURE: 108 MMHG | WEIGHT: 151.5 LBS | OXYGEN SATURATION: 97 %

## 2021-03-26 DIAGNOSIS — H91.92 HEARING PROBLEM OF LEFT EAR: ICD-10-CM

## 2021-03-26 DIAGNOSIS — H61.21 IMPACTED CERUMEN OF RIGHT EAR: Primary | ICD-10-CM

## 2021-03-26 PROCEDURE — G8536 NO DOC ELDER MAL SCRN: HCPCS | Performed by: STUDENT IN AN ORGANIZED HEALTH CARE EDUCATION/TRAINING PROGRAM

## 2021-03-26 PROCEDURE — G9717 DOC PT DX DEP/BP F/U NT REQ: HCPCS | Performed by: STUDENT IN AN ORGANIZED HEALTH CARE EDUCATION/TRAINING PROGRAM

## 2021-03-26 PROCEDURE — 1101F PT FALLS ASSESS-DOCD LE1/YR: CPT | Performed by: STUDENT IN AN ORGANIZED HEALTH CARE EDUCATION/TRAINING PROGRAM

## 2021-03-26 PROCEDURE — G0463 HOSPITAL OUTPT CLINIC VISIT: HCPCS | Performed by: STUDENT IN AN ORGANIZED HEALTH CARE EDUCATION/TRAINING PROGRAM

## 2021-03-26 PROCEDURE — 99213 OFFICE O/P EST LOW 20 MIN: CPT | Performed by: STUDENT IN AN ORGANIZED HEALTH CARE EDUCATION/TRAINING PROGRAM

## 2021-03-26 PROCEDURE — G8419 CALC BMI OUT NRM PARAM NOF/U: HCPCS | Performed by: STUDENT IN AN ORGANIZED HEALTH CARE EDUCATION/TRAINING PROGRAM

## 2021-03-26 PROCEDURE — G8427 DOCREV CUR MEDS BY ELIG CLIN: HCPCS | Performed by: STUDENT IN AN ORGANIZED HEALTH CARE EDUCATION/TRAINING PROGRAM

## 2021-03-26 RX ORDER — ASPIRIN 81 MG/1
162 TABLET ORAL DAILY
COMMUNITY

## 2021-03-26 NOTE — PROGRESS NOTES
Tez Robin is a 80 y.o. male , id x 2(name and ). Reviewed record, history, and  medications. Chief Complaint   Patient presents with    Wax in Ear     (L) ear wax, states that he has been using drops for a few days with a little relief. Vitals:    21 1010   Pulse: 80   Temp: 98.6 °F (37 °C)   SpO2: 97%   Weight: 151 lb 8 oz (68.7 kg)   Height: 5' 4\" (1.626 m)   PainSc:   0 - No pain       Coordination of Care Questionnaire:   1) Have you been to an emergency room, urgent care, or hospitalized since your last visit?   no       2. Have seen or consulted any other health care provider since your last visit? NO      3 most recent PHQ Screens 2018   Little interest or pleasure in doing things Not at all   Feeling down, depressed, irritable, or hopeless Several days   Total Score PHQ 2 1   Trouble falling or staying asleep, or sleeping too much -   Feeling tired or having little energy -   Poor appetite, weight loss, or overeating -   Feeling bad about yourself - or that you are a failure or have let yourself or your family down -   Trouble concentrating on things such as school, work, reading, or watching TV -   Moving or speaking so slowly that other people could have noticed; or the opposite being so fidgety that others notice -   Thoughts of being better off dead, or hurting yourself in some way -   PHQ 9 Score -   How difficult have these problems made it for you to do your work, take care of your home and get along with others -       Patient is accompanied by self I have received verbal consent from Tez Robin to discuss any/all medical information while they are present in the room.

## 2021-03-26 NOTE — PROGRESS NOTES
Progress Note    he is a 80y.o. year old male who presents for evalution. Subjective:     Chief Complaint   Patient presents with    Wax in Ear     (L) ear wax, states that he has been using drops for a few days with a little relief.  Labs     would like see if he can have labs. he had a small bowl of corn flakes this am,     Left is a problem with difficulty hearing. Thinks there is wax in it. Has been using drops    Reviewed PmHx, RxHx, FmHx, SocHx, AllgHx and updated and dated in the chart. Review of Systems - negative except as listed above in the HPI    Objective:     Vitals:    03/26/21 1010   BP: 108/60   Pulse: 80   Temp: 98.6 °F (37 °C)   SpO2: 97%   Weight: 151 lb 8 oz (68.7 kg)   Height: 5' 4\" (1.626 m)       Current Outpatient Medications   Medication Sig    aspirin delayed-release 81 mg tablet Take 162 mg by mouth daily. Takes 1/2 of a regular aspirin, irregularly.  levothyroxine (SYNTHROID) 112 mcg tablet Take 1 Tab by mouth Daily (before breakfast).  lisinopriL (PRINIVIL, ZESTRIL) 20 mg tablet TAKE 1 TABLET BY MOUTH DAILY    diclofenac EC (VOLTAREN) 75 mg EC tablet Take 1 Tab by mouth two (2) times daily as needed for Pain.  dicyclomine (BENTYL) 10 mg capsule Take 1 Cap by mouth every eight (8) hours as needed (ibs).  LORazepam (ATIVAN) 1 mg tablet TAKE 1 TABLET BY MOUTH EVERY NIGHT AT BEDTIME    ondansetron (ZOFRAN ODT) 8 mg disintegrating tablet Take 1 Tab by mouth every eight (8) hours as needed for Nausea or Vomiting.  clotrimazole-betamethasone (LOTRISONE) topical cream Apply  to affected area two (2) times a day.  Bifidobacterium Infantis (ALIGN) 4 mg cap Take 4 mg by mouth daily.  acetaminophen (TYLENOL) 325 mg tablet Take 325 mg by mouth every four (4) hours as needed for Pain. No current facility-administered medications for this visit. Physical Exam  Vitals signs and nursing note reviewed.    Constitutional:       General: He is not in acute distress. Appearance: Normal appearance. He is not ill-appearing, toxic-appearing or diaphoretic. HENT:      Head: Normocephalic and atraumatic. Right Ear: External ear normal. There is impacted cerumen. Left Ear: Tympanic membrane, ear canal and external ear normal.   Eyes:      General: No scleral icterus. Right eye: No discharge. Left eye: No discharge. Conjunctiva/sclera: Conjunctivae normal.   Neck:      Musculoskeletal: No neck rigidity. Pulmonary:      Effort: Pulmonary effort is normal. No respiratory distress. Skin:     General: Skin is warm and dry. Neurological:      General: No focal deficit present. Mental Status: He is alert. Assessment/ Plan:   Diagnoses and all orders for this visit:    1. Impacted cerumen of right ear - after ear flush, right ear canal and TM normal in appearance. Recommended maintenance with otc softening agents. 2. Hearing problem of left ear -recommended audiology evaluation for hearing loss, pt declines at this time. Recommended return for fasting labs. I have discussed the diagnosis with the patient and the intended plan as seen in the above orders. The patient has received an after-visit summary and questions were answered concerning future plans. Pt conveyed understanding of plan.     Medication Side Effects and Warnings were discussed with patient      Gerrie Schaumann, MD

## 2021-04-06 DIAGNOSIS — I10 PRIMARY HYPERTENSION: ICD-10-CM

## 2021-04-06 RX ORDER — LISINOPRIL 20 MG/1
20 TABLET ORAL DAILY
Qty: 30 TAB | Refills: 0 | Status: SHIPPED | OUTPATIENT
Start: 2021-04-06 | End: 2021-04-07

## 2021-04-07 RX ORDER — LISINOPRIL 20 MG/1
TABLET ORAL
Qty: 90 TAB | Refills: 0 | Status: SHIPPED | OUTPATIENT
Start: 2021-04-07 | End: 2021-07-09

## 2021-06-14 ENCOUNTER — OFFICE VISIT (OUTPATIENT)
Dept: FAMILY MEDICINE CLINIC | Age: 86
End: 2021-06-14
Payer: MEDICARE

## 2021-06-14 VITALS
HEART RATE: 70 BPM | DIASTOLIC BLOOD PRESSURE: 63 MMHG | TEMPERATURE: 98 F | BODY MASS INDEX: 25.78 KG/M2 | WEIGHT: 151 LBS | OXYGEN SATURATION: 97 % | HEIGHT: 64 IN | RESPIRATION RATE: 20 BRPM | SYSTOLIC BLOOD PRESSURE: 149 MMHG

## 2021-06-14 DIAGNOSIS — K55.9 ISCHEMIC COLITIS (HCC): ICD-10-CM

## 2021-06-14 DIAGNOSIS — I25.10 ATHEROSCLEROSIS OF NATIVE CORONARY ARTERY OF NATIVE HEART WITHOUT ANGINA PECTORIS: ICD-10-CM

## 2021-06-14 DIAGNOSIS — Z00.00 ROUTINE GENERAL MEDICAL EXAMINATION AT A HEALTH CARE FACILITY: Primary | ICD-10-CM

## 2021-06-14 DIAGNOSIS — E78.2 MIXED HYPERLIPIDEMIA: ICD-10-CM

## 2021-06-14 DIAGNOSIS — F33.9 EPISODE OF RECURRENT MAJOR DEPRESSIVE DISORDER, UNSPECIFIED DEPRESSION EPISODE SEVERITY (HCC): ICD-10-CM

## 2021-06-14 DIAGNOSIS — R73.9 ELEVATED BLOOD SUGAR: ICD-10-CM

## 2021-06-14 DIAGNOSIS — E03.9 ACQUIRED HYPOTHYROIDISM: ICD-10-CM

## 2021-06-14 DIAGNOSIS — I10 PRIMARY HYPERTENSION: ICD-10-CM

## 2021-06-14 DIAGNOSIS — C80.1 CANCER (HCC): ICD-10-CM

## 2021-06-14 DIAGNOSIS — F51.01 PRIMARY INSOMNIA: ICD-10-CM

## 2021-06-14 LAB
ALBUMIN SERPL-MCNC: 3.8 G/DL (ref 3.5–5)
ALBUMIN/GLOB SERPL: 1.1 {RATIO} (ref 1.1–2.2)
ALP SERPL-CCNC: 89 U/L (ref 45–117)
ALT SERPL-CCNC: 18 U/L (ref 12–78)
ANION GAP SERPL CALC-SCNC: 7 MMOL/L (ref 5–15)
AST SERPL-CCNC: 14 U/L (ref 15–37)
BASOPHILS # BLD: 0 K/UL (ref 0–0.1)
BASOPHILS NFR BLD: 0 % (ref 0–1)
BILIRUB SERPL-MCNC: 0.3 MG/DL (ref 0.2–1)
BUN SERPL-MCNC: 34 MG/DL (ref 6–20)
BUN/CREAT SERPL: 25 (ref 12–20)
CALCIUM SERPL-MCNC: 9.3 MG/DL (ref 8.5–10.1)
CHLORIDE SERPL-SCNC: 99 MMOL/L (ref 97–108)
CHOLEST SERPL-MCNC: 216 MG/DL
CO2 SERPL-SCNC: 26 MMOL/L (ref 21–32)
CREAT SERPL-MCNC: 1.34 MG/DL (ref 0.7–1.3)
DIFFERENTIAL METHOD BLD: NORMAL
EOSINOPHIL # BLD: 0 K/UL (ref 0–0.4)
EOSINOPHIL NFR BLD: 1 % (ref 0–7)
ERYTHROCYTE [DISTWIDTH] IN BLOOD BY AUTOMATED COUNT: 13.6 % (ref 11.5–14.5)
EST. AVERAGE GLUCOSE BLD GHB EST-MCNC: 111 MG/DL
GLOBULIN SER CALC-MCNC: 3.6 G/DL (ref 2–4)
GLUCOSE SERPL-MCNC: 82 MG/DL (ref 65–100)
HBA1C MFR BLD: 5.5 % (ref 4–5.6)
HCT VFR BLD AUTO: 42.6 % (ref 36.6–50.3)
HDLC SERPL-MCNC: 38 MG/DL
HDLC SERPL: 5.7 {RATIO} (ref 0–5)
HGB BLD-MCNC: 14.1 G/DL (ref 12.1–17)
IMM GRANULOCYTES # BLD AUTO: 0 K/UL (ref 0–0.04)
IMM GRANULOCYTES NFR BLD AUTO: 0 % (ref 0–0.5)
LDLC SERPL CALC-MCNC: 124.8 MG/DL (ref 0–100)
LYMPHOCYTES # BLD: 1.2 K/UL (ref 0.8–3.5)
LYMPHOCYTES NFR BLD: 17 % (ref 12–49)
MCH RBC QN AUTO: 28.8 PG (ref 26–34)
MCHC RBC AUTO-ENTMCNC: 33.1 G/DL (ref 30–36.5)
MCV RBC AUTO: 86.9 FL (ref 80–99)
MONOCYTES # BLD: 0.8 K/UL (ref 0–1)
MONOCYTES NFR BLD: 11 % (ref 5–13)
NEUTS SEG # BLD: 4.9 K/UL (ref 1.8–8)
NEUTS SEG NFR BLD: 71 % (ref 32–75)
NRBC # BLD: 0 K/UL (ref 0–0.01)
NRBC BLD-RTO: 0 PER 100 WBC
PLATELET # BLD AUTO: 246 K/UL (ref 150–400)
PMV BLD AUTO: 9.2 FL (ref 8.9–12.9)
POTASSIUM SERPL-SCNC: 6.3 MMOL/L (ref 3.5–5.1)
PROT SERPL-MCNC: 7.4 G/DL (ref 6.4–8.2)
RBC # BLD AUTO: 4.9 M/UL (ref 4.1–5.7)
SODIUM SERPL-SCNC: 132 MMOL/L (ref 136–145)
TRIGL SERPL-MCNC: 266 MG/DL (ref ?–150)
TSH SERPL DL<=0.05 MIU/L-ACNC: 2.01 UIU/ML (ref 0.36–3.74)
VLDLC SERPL CALC-MCNC: 53.2 MG/DL
WBC # BLD AUTO: 6.9 K/UL (ref 4.1–11.1)

## 2021-06-14 PROCEDURE — G8427 DOCREV CUR MEDS BY ELIG CLIN: HCPCS | Performed by: FAMILY MEDICINE

## 2021-06-14 PROCEDURE — 99214 OFFICE O/P EST MOD 30 MIN: CPT | Performed by: FAMILY MEDICINE

## 2021-06-14 PROCEDURE — G8419 CALC BMI OUT NRM PARAM NOF/U: HCPCS | Performed by: FAMILY MEDICINE

## 2021-06-14 PROCEDURE — G0463 HOSPITAL OUTPT CLINIC VISIT: HCPCS | Performed by: FAMILY MEDICINE

## 2021-06-14 PROCEDURE — G8536 NO DOC ELDER MAL SCRN: HCPCS | Performed by: FAMILY MEDICINE

## 2021-06-14 PROCEDURE — 1101F PT FALLS ASSESS-DOCD LE1/YR: CPT | Performed by: FAMILY MEDICINE

## 2021-06-14 PROCEDURE — G9717 DOC PT DX DEP/BP F/U NT REQ: HCPCS | Performed by: FAMILY MEDICINE

## 2021-06-14 PROCEDURE — G0439 PPPS, SUBSEQ VISIT: HCPCS | Performed by: FAMILY MEDICINE

## 2021-06-14 RX ORDER — LORAZEPAM 1 MG/1
TABLET ORAL
Qty: 90 TABLET | Refills: 1 | Status: SHIPPED | OUTPATIENT
Start: 2021-06-14 | End: 2021-06-16 | Stop reason: SDUPTHER

## 2021-06-14 NOTE — PROGRESS NOTES
Patient here for fasting labs, insomnia, patient feels like he would benefit from lorazepam being increased to 2 mg, and needs refill. 1. Have you been to the ER, urgent care clinic since your last visit? Hospitalized since your last visit? No    2. Have you seen or consulted any other health care providers outside of the [address] [place] [address] since your last visit? Include any pap smears or colon screening. No     Medicare Wellness Exam:    Chief Complaint   Patient presents with    Medication Refill    Insomnia    Labs     fasting     he is a 80y.o. year old male who presents for evaluation for their Medicare Wellness Visit. Beverly Quivers is completed and assessed=yes  Depression Screen is completed and assessed=yes  Medication list reviewed and adjusted for accuracy=yes  Immunizations reviewed and updated=yes  Health/Preventative Screenings reviewed and updated=yes  ADL Functions reviewed=yes    Patient Active Problem List    Diagnosis    Acute hyponatremia    Episode of recurrent major depressive disorder ([facility]    Stress incontinence of urine    Diverticulitis of large intestine with perforation and abscess without bleeding    Ischemic colitis ([facility]    Advance care planning     Has a Lw      Insomnia    Primary hypertension    Acquired hypothyroidism    Mixed hyperlipidemia    Coronary atherosclerosis of native coronary artery    Cancer [facility])     prostate         Reviewed PmHx, RxHx, FmHx, SocHx, AllgHx and updated and dated in the chart. Review of Systems - negative except as listed above in the HPI    Objective:     Vitals:    06/14/21 0958   BP: (!) 149/63   Pulse: 70   Resp: 20   Temp: 98 °F (36.7 °C)   SpO2: 97%   Weight: 151 lb (68.5 kg)   Height: 5' 4\" (1.626 m)       Assessment/ Plan:   Diagnoses and all orders for this visit:    1. Routine general medical examination at a health care facility  -     LIPID PANEL; Future  -     METABOLIC PANEL, COMPREHENSIVE;  Future  -     CBC WITH AUTOMATED DIFF; Future  -     TSH 3RD GENERATION; Future  -     HEMOGLOBIN A1C WITH EAG; Future  -     PSA W/ REFLX FREE PSA; Future  -see below  -has lw    2. Episode of recurrent major depressive disorder, unspecified depression episode severity ([facility]  -stable    3. Ischemic colitis ([facility]  -stable    4. Cancer ([facility]  -stble    5. Primary hypertension  -     LIPID PANEL; Future  -     METABOLIC PANEL, COMPREHENSIVE; Future    6. Acquired hypothyroidism  -     TSH 3RD GENERATION; Future    7. Mixed hyperlipidemia  -     LIPID PANEL; Future  -     METABOLIC PANEL, COMPREHENSIVE; Future    8. Atherosclerosis of native coronary artery of native heart without angina pectoris  -     LIPID PANEL; Future  -     METABOLIC PANEL, COMPREHENSIVE; Future    9. Elevated blood sugar  -     METABOLIC PANEL, COMPREHENSIVE; Future  -     HEMOGLOBIN A1C WITH EAG; Future  Lab Results   Component Value Date/Time    Hemoglobin A1c 5.6 07/25/2019 07:50 AM       10.  Primary insomnia  -     LORazepam (ATIVAN) 1 mg tablet; TAKE 1 TABLET BY MOUTH EVERY NIGHT AT BEDTIME  -add rx         -Pain evaluation performed in office  -Cognitive Screen performed in office  -Depression Screen, Fall risks (by up and go test)  and ADL functionality were addressed  -Medication list updated and reviewed for any changes   -A comprehensive review of medical issues and a plan was formulated  -End of life planning was addressed with pt   -Health Screenings for preventions were addressed and a plan was formulated  -Shingles Vaccine was recommended  -Discussed with patient cancer risk factors and appropriate screenings for age  -Patient evaluated for colonoscopy and referred if needed per screeing criteria  -Labs from previous visits were discussed with patient   -Discussed with patient diet and exercise and formulated a plan as needed  -An Advanced care plan was developed with the patient.  -Alcohol screening performed and was negative    -    I have discussed the diagnosis with the patient and the intended plan as seen in the above orders. The patient understands and agrees with the plan. The patient has received an after-visit summary and questions were answered concerning future plans. Medication Side Effects and Warnings were discussed with patien  Patient Labs were reviewed and or requested  Patient Past Records were reviewed and or requested    There are no Patient Instructions on file for this visit.       Deon Colunga M.D.

## 2021-06-15 LAB
PSA SERPL-MCNC: <0.1 NG/ML (ref 0–4)
REFLEX CRITERIA: NORMAL

## 2021-06-15 NOTE — PROGRESS NOTES
Labs look good. Potassium is elevated however this is secondary to most likely hemolysis.     Dr. Silviano Hernandez

## 2021-06-16 DIAGNOSIS — F51.01 PRIMARY INSOMNIA: ICD-10-CM

## 2021-06-16 RX ORDER — LORAZEPAM 1 MG/1
TABLET ORAL
Qty: 90 TABLET | Refills: 1 | Status: SHIPPED | OUTPATIENT
Start: 2021-06-16 | End: 2021-09-27 | Stop reason: SDUPTHER

## 2021-07-06 RX ORDER — ONDANSETRON 8 MG/1
8 TABLET, ORALLY DISINTEGRATING ORAL
Qty: 30 TABLET | Refills: 2 | Status: SHIPPED | OUTPATIENT
Start: 2021-07-06 | End: 2022-04-04 | Stop reason: ALTCHOICE

## 2021-07-09 DIAGNOSIS — I10 PRIMARY HYPERTENSION: ICD-10-CM

## 2021-07-09 RX ORDER — LISINOPRIL 20 MG/1
TABLET ORAL
Qty: 90 TABLET | Refills: 0 | Status: SHIPPED | OUTPATIENT
Start: 2021-07-09 | End: 2022-01-07 | Stop reason: SDUPTHER

## 2021-08-15 DIAGNOSIS — E03.9 ACQUIRED HYPOTHYROIDISM: ICD-10-CM

## 2021-08-17 RX ORDER — LEVOTHYROXINE SODIUM 112 UG/1
TABLET ORAL
Qty: 90 TABLET | Refills: 1 | Status: SHIPPED | OUTPATIENT
Start: 2021-08-17 | End: 2022-01-06

## 2021-09-27 DIAGNOSIS — F51.01 PRIMARY INSOMNIA: ICD-10-CM

## 2021-09-27 RX ORDER — LORAZEPAM 1 MG/1
TABLET ORAL
Qty: 90 TABLET | Refills: 1 | Status: SHIPPED | OUTPATIENT
Start: 2021-09-27 | End: 2021-10-05

## 2021-09-27 NOTE — TELEPHONE ENCOUNTER
Deo stating patient wants an early refill and pharmacy stated he is about two weeks early on that refill.  Deo phone: [phone]

## 2021-09-30 ENCOUNTER — TELEPHONE (OUTPATIENT)
Dept: FAMILY MEDICINE CLINIC | Age: 86
End: 2021-09-30

## 2021-09-30 NOTE — TELEPHONE ENCOUNTER
Called and spoke to Keenan Murguia. Patient would like to  lorazepam early. Pharmacist states he last picked up 90 day supply 7/19/2021. He's not due until 10/12/2021. Could they refill this lorazepam early?

## 2021-09-30 NOTE — TELEPHONE ENCOUNTER
Please call [facility]/[phone] as patient wants early refill of lorazepam. [facility] states they don't need a new script but need a call to let them know if they can do an early refill.

## 2021-10-01 ENCOUNTER — TELEPHONE (OUTPATIENT)
Dept: FAMILY MEDICINE CLINIC | Age: 86
End: 2021-10-01

## 2021-10-01 NOTE — TELEPHONE ENCOUNTER
Melody/Pharmacist from [facility] stated she is going to go ahead and authorize Lorazepam early refill.  Melody's phone: [phone]

## 2021-10-05 ENCOUNTER — OFFICE VISIT (OUTPATIENT)
Dept: FAMILY MEDICINE CLINIC | Age: 86
End: 2021-10-05
Payer: MEDICARE

## 2021-10-05 VITALS
OXYGEN SATURATION: 96 % | TEMPERATURE: 98.3 F | BODY MASS INDEX: 26.29 KG/M2 | RESPIRATION RATE: 18 BRPM | HEART RATE: 75 BPM | DIASTOLIC BLOOD PRESSURE: 72 MMHG | SYSTOLIC BLOOD PRESSURE: 129 MMHG | WEIGHT: 154 LBS | HEIGHT: 64 IN

## 2021-10-05 DIAGNOSIS — E03.9 ACQUIRED HYPOTHYROIDISM: ICD-10-CM

## 2021-10-05 DIAGNOSIS — F51.01 PRIMARY INSOMNIA: Primary | ICD-10-CM

## 2021-10-05 DIAGNOSIS — I10 PRIMARY HYPERTENSION: ICD-10-CM

## 2021-10-05 PROCEDURE — G9717 DOC PT DX DEP/BP F/U NT REQ: HCPCS | Performed by: FAMILY MEDICINE

## 2021-10-05 PROCEDURE — 99214 OFFICE O/P EST MOD 30 MIN: CPT | Performed by: FAMILY MEDICINE

## 2021-10-05 PROCEDURE — G8536 NO DOC ELDER MAL SCRN: HCPCS | Performed by: FAMILY MEDICINE

## 2021-10-05 PROCEDURE — G0463 HOSPITAL OUTPT CLINIC VISIT: HCPCS | Performed by: FAMILY MEDICINE

## 2021-10-05 PROCEDURE — G8427 DOCREV CUR MEDS BY ELIG CLIN: HCPCS | Performed by: FAMILY MEDICINE

## 2021-10-05 PROCEDURE — 1101F PT FALLS ASSESS-DOCD LE1/YR: CPT | Performed by: FAMILY MEDICINE

## 2021-10-05 PROCEDURE — G8419 CALC BMI OUT NRM PARAM NOF/U: HCPCS | Performed by: FAMILY MEDICINE

## 2021-10-05 RX ORDER — LORAZEPAM 2 MG/1
TABLET ORAL
Qty: 90 TABLET | Refills: 1 | Status: SHIPPED | OUTPATIENT
Start: 2021-10-05 | End: 2021-12-02 | Stop reason: SDUPTHER

## 2021-10-05 NOTE — PROGRESS NOTES
Patient here for sleeping issues. He feels lorazepam 1 mg is not enough anymore. He states he falls asleep, gets up and can't go back to sleep. He states he wakes up in the morning exhausted. He would like to discuss increasing lorazepam to help him get better sleep at night. 1. Have you been to the ER, urgent care clinic since your last visit? Hospitalized since your last visit? No    2. Have you seen or consulted any other health care providers outside of the [address] [place] [address] since your last visit? Include any pap smears or colon screening. No              Chief Complaint   Patient presents with    Insomnia     lorazepam 1 mg does not seem to work     He is a 80 y.o. male who presents for evalution. Reviewed PmHx, RxHx, FmHx, SocHx, AllgHx and updated and dated in the chart. Patient Active Problem List    Diagnosis    Acute hyponatremia    Episode of recurrent major depressive disorder ([facility]    Stress incontinence of urine    Diverticulitis of large intestine with perforation and abscess without bleeding    Ischemic colitis ([facility]    Advance care planning     Has a Lw      Insomnia    Primary hypertension    Acquired hypothyroidism    Mixed hyperlipidemia    Coronary atherosclerosis of native coronary artery    Cancer [facility])     prostate         Review of Systems - negative except as listed above in the HPI    Objective:     Vitals:    10/05/21 1056   BP: 129/72   Pulse: 75   Resp: 18   Temp: 98.3 °F (36.8 °C)   SpO2: 96%   Weight: 154 lb (69.9 kg)   Height: 5' 4\" (1.626 m)         Assessment/ Plan:   Diagnoses and all orders for this visit:    1. Primary insomnia  -     LORazepam (ATIVAN) 2 mg tablet; TAKE 1 TABLET BY MOUTH EVERY NIGHT AT BEDTIME  -trouble staying asleep  -inc rx     2. Primary hypertension  -at goal    3. Acquired hypothyroidism  -at goal  And labs utd             I have discussed the diagnosis with the patient and the intended plan as seen in the above orders. The patient understands and agrees with the plan. The patient has received an after-visit summary and questions were answered concerning future plans. Medication Side Effects and Warnings were discussed with patient  Patient Labs were reviewed and or requested:  Patient Past Records were reviewed and or requested    Mickey Coello M.D. There are no Patient Instructions on file for this visit.

## 2021-12-02 DIAGNOSIS — F51.01 PRIMARY INSOMNIA: ICD-10-CM

## 2021-12-02 RX ORDER — LORAZEPAM 2 MG/1
TABLET ORAL
Qty: 90 TABLET | Refills: 1 | Status: SHIPPED | OUTPATIENT
Start: 2021-12-02 | End: 2022-04-01 | Stop reason: SDUPTHER

## 2021-12-22 ENCOUNTER — TELEPHONE (OUTPATIENT)
Dept: FAMILY MEDICINE CLINIC | Age: 86
End: 2021-12-22

## 2021-12-22 RX ORDER — NYSTATIN 100000 [USP'U]/G
POWDER TOPICAL 4 TIMES DAILY
Qty: 1 EACH | Refills: 5 | Status: SHIPPED | OUTPATIENT
Start: 2021-12-22 | End: 2022-01-25 | Stop reason: SDUPTHER

## 2021-12-22 NOTE — TELEPHONE ENCOUNTER
Patient has extreme Jock Itch. He stated he has tried everything but it isn't helping at all. He wanted to be seen by provider but nothing is available this or next week. He is requesting something be sent to pharmacy on file.  Patient's phone: [phone]

## 2022-01-06 ENCOUNTER — TELEPHONE (OUTPATIENT)
Dept: FAMILY MEDICINE CLINIC | Age: 87
End: 2022-01-06

## 2022-01-06 DIAGNOSIS — E03.9 ACQUIRED HYPOTHYROIDISM: ICD-10-CM

## 2022-01-06 DIAGNOSIS — I10 PRIMARY HYPERTENSION: ICD-10-CM

## 2022-01-06 RX ORDER — LEVOTHYROXINE SODIUM 112 UG/1
TABLET ORAL
Qty: 90 TABLET | Refills: 1 | Status: SHIPPED | OUTPATIENT
Start: 2022-01-06 | End: 2022-01-25

## 2022-01-06 RX ORDER — LEVOTHYROXINE SODIUM 112 UG/1
TABLET ORAL
Qty: 90 TABLET | Refills: 1 | Status: SHIPPED | OUTPATIENT
Start: 2022-01-06

## 2022-01-06 NOTE — TELEPHONE ENCOUNTER
Please call patient to discuss his lisinopril. It is pending per Dr Marley Pak.  Patient's phone: [phone]

## 2022-01-07 DIAGNOSIS — I10 PRIMARY HYPERTENSION: ICD-10-CM

## 2022-01-07 RX ORDER — LISINOPRIL 20 MG/1
20 TABLET ORAL DAILY
Qty: 90 TABLET | Refills: 0 | Status: SHIPPED | OUTPATIENT
Start: 2022-01-07

## 2022-01-07 RX ORDER — LISINOPRIL 20 MG/1
TABLET ORAL
Qty: 90 TABLET | Refills: 0 | OUTPATIENT
Start: 2022-01-07

## 2022-01-17 RX ORDER — NYSTATIN 100000 U/G
CREAM TOPICAL
Qty: 30 G | Refills: 1 | Status: SHIPPED | OUTPATIENT
Start: 2022-01-17 | End: 2022-04-04 | Stop reason: ALTCHOICE

## 2022-01-17 RX ORDER — FLUCONAZOLE 100 MG/1
100 TABLET ORAL DAILY
Qty: 7 TABLET | Refills: 0 | Status: SHIPPED | OUTPATIENT
Start: 2022-01-17 | End: 2022-01-24

## 2022-01-17 NOTE — TELEPHONE ENCOUNTER
Called and spoke to patient to reschedule 1/17 OV. Patient states still having severe and persistent \"Jock Itch\" symptoms. Patient states he is using Lortrisone cream and Nystantin powder without relief. Changing briefs daily with a brand new pair. Keeping area thouroughly dry discussed. Patient rescheduled appointment and is meantime requesting \"industrial strength\" RX or recommendation.

## 2022-01-17 NOTE — TELEPHONE ENCOUNTER
I sent in both a cream and a pill to take once a day for 7 days to treat it from the inside and the outside.  Olman Galo

## 2022-01-25 ENCOUNTER — OFFICE VISIT (OUTPATIENT)
Dept: FAMILY MEDICINE CLINIC | Age: 87
End: 2022-01-25
Payer: MEDICARE

## 2022-01-25 VITALS
RESPIRATION RATE: 18 BRPM | HEIGHT: 64 IN | SYSTOLIC BLOOD PRESSURE: 134 MMHG | WEIGHT: 157 LBS | DIASTOLIC BLOOD PRESSURE: 76 MMHG | OXYGEN SATURATION: 96 % | TEMPERATURE: 98.4 F | HEART RATE: 83 BPM | BODY MASS INDEX: 26.8 KG/M2

## 2022-01-25 DIAGNOSIS — F33.9 EPISODE OF RECURRENT MAJOR DEPRESSIVE DISORDER, UNSPECIFIED DEPRESSION EPISODE SEVERITY (HCC): ICD-10-CM

## 2022-01-25 DIAGNOSIS — K55.9 ISCHEMIC COLITIS (HCC): ICD-10-CM

## 2022-01-25 DIAGNOSIS — C80.1 CANCER (HCC): ICD-10-CM

## 2022-01-25 DIAGNOSIS — I10 PRIMARY HYPERTENSION: ICD-10-CM

## 2022-01-25 DIAGNOSIS — R21 RASH AND OTHER NONSPECIFIC SKIN ERUPTION: Primary | ICD-10-CM

## 2022-01-25 PROCEDURE — G0463 HOSPITAL OUTPT CLINIC VISIT: HCPCS | Performed by: FAMILY MEDICINE

## 2022-01-25 PROCEDURE — G8536 NO DOC ELDER MAL SCRN: HCPCS | Performed by: FAMILY MEDICINE

## 2022-01-25 PROCEDURE — 99213 OFFICE O/P EST LOW 20 MIN: CPT | Performed by: FAMILY MEDICINE

## 2022-01-25 PROCEDURE — 1101F PT FALLS ASSESS-DOCD LE1/YR: CPT | Performed by: FAMILY MEDICINE

## 2022-01-25 PROCEDURE — G8419 CALC BMI OUT NRM PARAM NOF/U: HCPCS | Performed by: FAMILY MEDICINE

## 2022-01-25 PROCEDURE — G8427 DOCREV CUR MEDS BY ELIG CLIN: HCPCS | Performed by: FAMILY MEDICINE

## 2022-01-25 PROCEDURE — G9717 DOC PT DX DEP/BP F/U NT REQ: HCPCS | Performed by: FAMILY MEDICINE

## 2022-01-25 RX ORDER — NYSTATIN 100000 [USP'U]/G
POWDER TOPICAL 4 TIMES DAILY
Qty: 1 EACH | Refills: 5 | Status: SHIPPED | OUTPATIENT
Start: 2022-01-25 | End: 2022-04-04 | Stop reason: ALTCHOICE

## 2022-01-25 NOTE — PROGRESS NOTES
Chief Complaint   Patient presents with    Itching     Patient presents in office today with c/o itching in his groin area. No other concerns. 1. Have you been to the ER, urgent care clinic since your last visit? Hospitalized since your last visit? No    2. Have you seen or consulted any other health care providers outside of the [address] [place] [address] since your last visit? Include any pap smears or colon screening. No    Learning Assessment 1/16/2018   PRIMARY LEARNER Patient   HIGHEST LEVEL OF EDUCATION - PRIMARY LEARNER  2 YEARS OF COLLEGE   BARRIERS PRIMARY LEARNER NONE   CO-LEARNER CAREGIVER No   PRIMARY LANGUAGE ENGLISH   LEARNER PREFERENCE PRIMARY DEMONSTRATION   ANSWERED BY patient   RELATIONSHIP SELF            Chief Complaint   Patient presents with    Itching     He is a 80 y.o. male who presents for evalution. Reviewed PmHx, RxHx, FmHx, SocHx, AllgHx and updated and dated in the chart. Patient Active Problem List    Diagnosis    Acute hyponatremia    Episode of recurrent major depressive disorder ([facility]    Stress incontinence of urine    Diverticulitis of large intestine with perforation and abscess without bleeding    Ischemic colitis ([facility]    Advance care planning     Has a Lw      Insomnia    Primary hypertension    Acquired hypothyroidism    Mixed hyperlipidemia    Coronary atherosclerosis of native coronary artery    Cancer [facility])     prostate         Review of Systems - negative except as listed above in the HPI    Objective:     Vitals:    01/25/22 1422   BP: 134/76   Pulse: 83   Resp: 18   Temp: 98.4 °F (36.9 °C)   TempSrc: Oral   SpO2: 96%   Weight: 157 lb (71.2 kg)   Height: 5' 4\" (1.626 m)         Assessment/ Plan:   Diagnoses and all orders for this visit:    1. Rash and other nonspecific skin eruption  -     nystatin (MYCOSTATIN) powder; Apply  to affected area four (4) times daily.   -pt never took  rx    2. Episode of recurrent major depressive disorder, unspecified depression episode severity ([facility])  -stable    3. Ischemic colitis ([facility]  -no sxs    4. Cancer ([facility]  -resolved    5. Primary hypertension  -at goal             I have discussed the diagnosis with the patient and the intended plan as seen in the above orders. The patient understands and agrees with the plan. The patient has received an after-visit summary and questions were answered concerning future plans. Medication Side Effects and Warnings were discussed with patient  Patient Labs were reviewed and or requested:  Patient Past Records were reviewed and or requested    Lizbeth Cifuentes M.D. There are no Patient Instructions on file for this visit.

## 2022-03-11 ENCOUNTER — TELEPHONE (OUTPATIENT)
Dept: FAMILY MEDICINE CLINIC | Age: 87
End: 2022-03-11

## 2022-03-11 NOTE — TELEPHONE ENCOUNTER
Patient's daughter/Ness Gray -no one is on patient's PHI would like to speak with nurse about a medication that she stated makes him a zombie. She stated patient is drinking a glass of wine along with this and is driving as well. Ms Robert Gray stated she is on his medical directive. Ms Robert Gray phone: [phone]. Patient clearly wrote on PHI- No One to have access.

## 2022-03-14 NOTE — TELEPHONE ENCOUNTER
Called and spoke to patient daughter. Naty Rosario)   Daughter states understanding that she is no longer listed on patient PHI form and patient's information cannot be discussed. Daughter states that she is concerned about patient because he is showing increasing signs of dementia and she does not think he is taking his medicines correctly. Per daughter patient slurs speech and she feels he is taking his Ativan several times daily. Daughter states understanding to come with patient to next office visit to ensure patient lists her on HIPAA. Called and spoke to patient. Martha Hernandez) Patient states understanding of Dr Juanita Asencio Nurse calling for a well check. Patient states he is getting around, denies pain or discomfort and eating well. Patient declines to make OV at this time but will call back to office when he is ready to make appointment to see Dr Juanita Asencio.

## 2022-03-19 PROBLEM — K57.20 DIVERTICULITIS OF LARGE INTESTINE WITH PERFORATION AND ABSCESS WITHOUT BLEEDING: Status: ACTIVE | Noted: 2017-12-05

## 2022-03-19 PROBLEM — F33.9 EPISODE OF RECURRENT MAJOR DEPRESSIVE DISORDER (HCC): Status: ACTIVE | Noted: 2018-05-01

## 2022-03-19 PROBLEM — Z71.89 ADVANCE CARE PLANNING: Status: ACTIVE | Noted: 2017-01-18

## 2022-03-19 PROBLEM — K55.9 ISCHEMIC COLITIS (HCC): Status: ACTIVE | Noted: 2017-07-12

## 2022-03-19 PROBLEM — N39.3 STRESS INCONTINENCE OF URINE: Status: ACTIVE | Noted: 2018-01-30

## 2022-03-19 PROBLEM — E87.1 ACUTE HYPONATREMIA: Status: ACTIVE | Noted: 2019-08-11

## 2022-03-30 ENCOUNTER — TELEPHONE (OUTPATIENT)
Dept: FAMILY MEDICINE CLINIC | Age: 87
End: 2022-03-30

## 2022-03-30 NOTE — TELEPHONE ENCOUNTER
returned call to pharmacist. picked up refill rx lorazepam 3/2/22 90 day supply but patient calling them for a refill already  he is 60 days early  she states patient states he only got 30 but pharmacy double checked prescription with another pharmacist, but they have a camera. I attempted to call patient. No answer. No option to leave message.

## 2022-03-30 NOTE — TELEPHONE ENCOUNTER
Jaz Richardson from [facility] wanted to discuss patient requesting his ativan refill. She does not feel comfortable giving patient more ativan since he is requesting 60 days early.  Please call her back [phone]

## 2022-03-31 ENCOUNTER — TELEPHONE (OUTPATIENT)
Dept: FAMILY MEDICINE CLINIC | Age: 87
End: 2022-03-31

## 2022-03-31 NOTE — TELEPHONE ENCOUNTER
----- Message from Marygregor Melendez sent at 3/30/2022  5:49 PM EDT -----  Subject: Refill Request    QUESTIONS  Name of Medication? LORazepam (ATIVAN) 2 mg tablet  Patient-reported dosage and instructions? one a day at night  How many days do you have left? 0  [facility]? [id number]  Pharmacy phone number (if available)? [phone]  ---------------------------------------------------------------------------  --------------  Felix SNEED  What is the best way for the office to contact you? OK to leave message on   voicemail  Preferred Call Back Phone Number?  [phone]

## 2022-03-31 NOTE — TELEPHONE ENCOUNTER
Patient called. Wanted to discuss ativan ( returning my call from yesterday) He states he has not picked up his refill 3/2/2022. He states,\" I am a [place] and do not lie! \" I asked him if there was a chance that since he was 80years old , could there be a chance that he may have forgotten? He states \"no, I have my mind, I remember things well\"  I informed him what the pharmacist told me and he disputed it He states he only takes one pill when he wakes up during the night because he can't go back to sleep. Sometimes he takes only half a pill. I told him I would let Dr. Farideh Hogan know. I also told him I would call the pharmacist again on his behalf and let Dr. Farideh Hogan know on Monday so he can make that decision. Patient was grateful. I called melody. Spoke to Y'all. She states he did receive 90 pills on 3/2/22 and he even brought back that same  bottle with the date 3/2/22 when he requested his refill. They told him it was too early, that he should have about 60 tablets left. She states that he calls the pharmacy and then calls right back for the same reason forgetting he already spoke to someone. Pharmacist can not refill this medication.

## 2022-04-01 DIAGNOSIS — F51.01 PRIMARY INSOMNIA: ICD-10-CM

## 2022-04-01 RX ORDER — LORAZEPAM 2 MG/1
TABLET ORAL
Qty: 90 TABLET | Refills: 1 | Status: SHIPPED | OUTPATIENT
Start: 2022-04-01

## 2022-04-04 ENCOUNTER — OFFICE VISIT (OUTPATIENT)
Dept: FAMILY MEDICINE CLINIC | Age: 87
End: 2022-04-04
Payer: MEDICARE

## 2022-04-04 VITALS
WEIGHT: 152 LBS | RESPIRATION RATE: 18 BRPM | DIASTOLIC BLOOD PRESSURE: 70 MMHG | TEMPERATURE: 98.2 F | BODY MASS INDEX: 25.95 KG/M2 | HEIGHT: 64 IN | HEART RATE: 78 BPM | OXYGEN SATURATION: 97 % | SYSTOLIC BLOOD PRESSURE: 122 MMHG

## 2022-04-04 DIAGNOSIS — I10 PRIMARY HYPERTENSION: ICD-10-CM

## 2022-04-04 DIAGNOSIS — E03.9 ACQUIRED HYPOTHYROIDISM: ICD-10-CM

## 2022-04-04 DIAGNOSIS — F51.01 PRIMARY INSOMNIA: ICD-10-CM

## 2022-04-04 DIAGNOSIS — L29.9 ITCHING: Primary | ICD-10-CM

## 2022-04-04 DIAGNOSIS — F03.90 DEMENTIA WITHOUT BEHAVIORAL DISTURBANCE, UNSPECIFIED DEMENTIA TYPE: ICD-10-CM

## 2022-04-04 PROCEDURE — G0463 HOSPITAL OUTPT CLINIC VISIT: HCPCS | Performed by: FAMILY MEDICINE

## 2022-04-04 PROCEDURE — G8536 NO DOC ELDER MAL SCRN: HCPCS | Performed by: FAMILY MEDICINE

## 2022-04-04 PROCEDURE — 1101F PT FALLS ASSESS-DOCD LE1/YR: CPT | Performed by: FAMILY MEDICINE

## 2022-04-04 PROCEDURE — G8427 DOCREV CUR MEDS BY ELIG CLIN: HCPCS | Performed by: FAMILY MEDICINE

## 2022-04-04 PROCEDURE — G8419 CALC BMI OUT NRM PARAM NOF/U: HCPCS | Performed by: FAMILY MEDICINE

## 2022-04-04 PROCEDURE — G9717 DOC PT DX DEP/BP F/U NT REQ: HCPCS | Performed by: FAMILY MEDICINE

## 2022-04-04 PROCEDURE — 99214 OFFICE O/P EST MOD 30 MIN: CPT | Performed by: FAMILY MEDICINE

## 2022-04-04 NOTE — TELEPHONE ENCOUNTER
Can you get a contact number for his daughter---he stated during his visit that it was ok to talk to either of them

## 2022-04-04 NOTE — TELEPHONE ENCOUNTER
Tanner Haider ([facility]) phone: [phone]. He listed her on [employer] forms today.

## 2022-04-04 NOTE — TELEPHONE ENCOUNTER
Received a RC from Lisa Keith with the pharm stating that the pt was there requesting his medication and she wanted to know the outcome of her previous message. Per Lisa Keith she states that the pt had been to the pharmacy 3 times this past weekend and stated that he could not find our office. Lisa [place] Keith had to draw him a map and he was still unable to locate it. During one of the pt's visits to the pharm he could not recall that he had the same conversation with her the day prior. Lisa Keith is very concerned about the pt's competency and medication safety. Advised that pt had an appt today and that Okay to fill med was given. Mariposa verbalized understanding.

## 2022-04-04 NOTE — PROGRESS NOTES
Patient here for follow up on sleep issues and neck itching. 1. Have you been to the ER, urgent care clinic since your last visit? Hospitalized since your last visit? No    2. Have you seen or consulted any other health care providers outside of the [address] [place] [address] since your last visit? Include any pap smears or colon screening. No              Chief Complaint   Patient presents with    Insomnia    Skin Problem     neck itching     He is a 80 y.o. male who presents for evalution. Reviewed PmHx, RxHx, FmHx, SocHx, AllgHx and updated and dated in the chart. Patient Active Problem List    Diagnosis    Acute hyponatremia    Episode of recurrent major depressive disorder ([facility]    Stress incontinence of urine    Diverticulitis of large intestine with perforation and abscess without bleeding    Ischemic colitis ([facility]    Advance care planning     Has a Lw      Insomnia    Primary hypertension    Acquired hypothyroidism    Mixed hyperlipidemia    Coronary atherosclerosis of native coronary artery    Cancer [facility])     prostate         Review of Systems - negative except as listed above in the HPI    Objective:     Vitals:    04/04/22 0940   BP: 122/70   Pulse: 78   Resp: 18   Temp: 98.2 °F (36.8 °C)   SpO2: 97%   Weight: 152 lb (68.9 kg)   Height: 5' 4\" (1.626 m)         Assessment/ Plan:   Diagnoses and all orders for this visit:    1. Itching  -may add occ benedryl for itching    2. Primary hypertension  -     METABOLIC PANEL, COMPREHENSIVE; Future  -at goal    3. Primary insomnia  -cont with rx  -memory is an issue--can only name current president and none before  -moderate dementia but pt is resistant to any discussion  -not a current danger to self or others but would recommend support-does not have family listed to discuss    4. Acquired hypothyroidism  -     TSH 3RD GENERATION; Future         Follow-up and Dispositions    · Return in about 6 months (around 10/4/2022).          I have discussed the diagnosis with the patient and the intended plan as seen in the above orders. The patient understands and agrees with the plan. The patient has received an after-visit summary and questions were answered concerning future plans. Medication Side Effects and Warnings were discussed with patient  Patient Labs were reviewed and or requested:  Patient Past Records were reviewed and or requested    Akil Lewis M.D. There are no Patient Instructions on file for this visit.

## 2022-04-13 ENCOUNTER — HOSPITAL ENCOUNTER (EMERGENCY)
Age: 87
Discharge: HOME | End: 2022-04-13
Attending: EMERGENCY MEDICINE
Payer: MEDICARE

## 2022-04-13 VITALS
SYSTOLIC BLOOD PRESSURE: 200 MMHG | HEIGHT: 65 IN | OXYGEN SATURATION: 98 % | BODY MASS INDEX: 21.66 KG/M2 | TEMPERATURE: 97.1 F | DIASTOLIC BLOOD PRESSURE: 79 MMHG | HEART RATE: 75 BPM | WEIGHT: 130 LBS

## 2022-04-13 DIAGNOSIS — R19.7 DIARRHEA, UNSPECIFIED TYPE: Primary | ICD-10-CM

## 2022-04-13 LAB
ALBUMIN SERPL-MCNC: 3.4 G/DL (ref 3.5–5)
ALBUMIN/GLOB SERPL: 0.9 {RATIO} (ref 1.1–2.2)
ALP SERPL-CCNC: 74 U/L (ref 45–117)
ALT SERPL-CCNC: 15 U/L (ref 12–78)
ANION GAP SERPL CALC-SCNC: 8 MMOL/L (ref 5–15)
APPEARANCE UR: CLEAR
AST SERPL-CCNC: 13 U/L (ref 15–37)
BACTERIA URNS QL MICRO: NEGATIVE /HPF
BASOPHILS # BLD: 0 K/UL (ref 0–0.1)
BASOPHILS NFR BLD: 0 % (ref 0–1)
BILIRUB SERPL-MCNC: 0.3 MG/DL (ref 0.2–1)
BILIRUB UR QL: NEGATIVE
BUN SERPL-MCNC: 26 MG/DL (ref 6–20)
BUN/CREAT SERPL: 20 (ref 12–20)
CALCIUM SERPL-MCNC: 8.6 MG/DL (ref 8.5–10.1)
CHLORIDE SERPL-SCNC: 106 MMOL/L (ref 97–108)
CO2 SERPL-SCNC: 22 MMOL/L (ref 21–32)
COLOR UR: NORMAL
CREAT SERPL-MCNC: 1.32 MG/DL (ref 0.7–1.3)
DIFFERENTIAL METHOD BLD: ABNORMAL
EOSINOPHIL # BLD: 0 K/UL (ref 0–0.4)
EOSINOPHIL NFR BLD: 1 % (ref 0–7)
EPITH CASTS URNS QL MICRO: NORMAL /LPF
ERYTHROCYTE [DISTWIDTH] IN BLOOD BY AUTOMATED COUNT: 14.1 % (ref 11.5–14.5)
GLOBULIN SER CALC-MCNC: 3.7 G/DL (ref 2–4)
GLUCOSE SERPL-MCNC: 93 MG/DL (ref 65–100)
GLUCOSE UR STRIP.AUTO-MCNC: NEGATIVE MG/DL
HCT VFR BLD AUTO: 43.4 % (ref 36.6–50.3)
HGB BLD-MCNC: 14.3 G/DL (ref 12.1–17)
HGB UR QL STRIP: NEGATIVE
HYALINE CASTS URNS QL MICRO: NORMAL /LPF (ref 0–2)
IMM GRANULOCYTES # BLD AUTO: 0 K/UL (ref 0–0.04)
IMM GRANULOCYTES NFR BLD AUTO: 0 % (ref 0–0.5)
KETONES UR QL STRIP.AUTO: NEGATIVE MG/DL
LEUKOCYTE ESTERASE UR QL STRIP.AUTO: NEGATIVE
LYMPHOCYTES # BLD: 1.1 K/UL (ref 0.8–3.5)
LYMPHOCYTES NFR BLD: 12 % (ref 12–49)
MAGNESIUM SERPL-MCNC: 2.1 MG/DL (ref 1.6–2.4)
MCH RBC QN AUTO: 28.4 PG (ref 26–34)
MCHC RBC AUTO-ENTMCNC: 32.9 G/DL (ref 30–36.5)
MCV RBC AUTO: 86.3 FL (ref 80–99)
MONOCYTES # BLD: 0.8 K/UL (ref 0–1)
MONOCYTES NFR BLD: 10 % (ref 5–13)
NEUTS SEG # BLD: 6.7 K/UL (ref 1.8–8)
NEUTS SEG NFR BLD: 77 % (ref 32–75)
NITRITE UR QL STRIP.AUTO: NEGATIVE
NRBC # BLD: 0 K/UL (ref 0–0.01)
NRBC BLD-RTO: 0 PER 100 WBC
PH UR STRIP: 6.5 [PH] (ref 5–8)
PLATELET # BLD AUTO: 228 K/UL (ref 150–400)
PMV BLD AUTO: 8.7 FL (ref 8.9–12.9)
POTASSIUM SERPL-SCNC: 4.8 MMOL/L (ref 3.5–5.1)
PROT SERPL-MCNC: 7.1 G/DL (ref 6.4–8.2)
PROT UR STRIP-MCNC: NEGATIVE MG/DL
RBC # BLD AUTO: 5.03 M/UL (ref 4.1–5.7)
RBC #/AREA URNS HPF: NORMAL /HPF (ref 0–5)
SODIUM SERPL-SCNC: 136 MMOL/L (ref 136–145)
SP GR UR REFRACTOMETRY: 1.01 (ref 1–1.03)
UR CULT HOLD, URHOLD: NORMAL
UROBILINOGEN UR QL STRIP.AUTO: 0.2 EU/DL (ref 0.2–1)
WBC # BLD AUTO: 8.7 K/UL (ref 4.1–11.1)
WBC URNS QL MICRO: NORMAL /HPF (ref 0–4)

## 2022-04-13 PROCEDURE — 80053 COMPREHEN METABOLIC PANEL: CPT

## 2022-04-13 PROCEDURE — 99284 EMERGENCY DEPT VISIT MOD MDM: CPT

## 2022-04-13 PROCEDURE — 36415 COLL VENOUS BLD VENIPUNCTURE: CPT

## 2022-04-13 PROCEDURE — 85025 COMPLETE CBC W/AUTO DIFF WBC: CPT

## 2022-04-13 PROCEDURE — 81001 URINALYSIS AUTO W/SCOPE: CPT

## 2022-04-13 PROCEDURE — 74011250636 HC RX REV CODE- 250/636: Performed by: NURSE PRACTITIONER

## 2022-04-13 PROCEDURE — 83735 ASSAY OF MAGNESIUM: CPT

## 2022-04-13 RX ADMIN — SODIUM CHLORIDE, POTASSIUM CHLORIDE, SODIUM LACTATE AND CALCIUM CHLORIDE 1000 ML: 600; 310; 30; 20 INJECTION, SOLUTION INTRAVENOUS at 14:14

## 2022-04-13 NOTE — ED PROVIDER NOTES
Date of Service:  2022    Patient:  Ben Jaime    Chief Complaint:  Diarrhea       HPI:  Ben Jaime is a 80 y.o.  male who presents for evaluation of diarrhea. Patient states for the last 3 days he has had 2-3 episodes of explosive diarrhea a day. No abdominal pain or cramping. No nausea vomiting. No recent illness that he is aware of. He states that he really has not been eating or drinking a lot over the same time to schedule little bit weak. No other acute complaints           Past Medical History:   Diagnosis Date    CAD (coronary artery disease)     Cancer ([facility] [address]     prostate    Hypercholesterolemia     Hypertension        Past Surgical History:   Procedure Laterality Date    ENDOSCOPY, COLON, DIAGNOSTIC      Marianna -- 5 year fu    HX CATARACT REMOVAL      Left and Right    HX COLONOSCOPY      2016 Dr. Suresh Shipley [address] GRAFT  2001    [address] [place] [address], [facility]    HX PROSTATECTOMY      HX TONSILLECTOMY           Family History:   Problem Relation Age of Onset    Cancer Sister         Brain    Stroke Brother     Cancer Brother         colon    Diabetes Brother     Cancer Mother         Colon    Heart Disease Father        Social History     Socioeconomic History    Marital status:      Spouse name: Not on file    Number of children: Not on file    Years of education: Not on file    Highest education level: Not on file   Occupational History    Not on file   Tobacco Use    Smoking status: Former Smoker     Quit date: 1970     Years since quittin.8    Smokeless tobacco: Never Used   Substance and Sexual Activity    Alcohol use:  Yes     Alcohol/week: 5.8 standard drinks     Types: 7 Glasses of wine per week     Comment: red wine one glass/night    Drug use: No    Sexual activity: Not Currently   Other Topics Concern    Not on file   Social History Narrative    Not on file     Social Determinants of Health Financial Resource Strain:     Difficulty of Paying Living Expenses: Not on file   Food Insecurity:     Worried About Running Out of Food in the Last Year: Not on file    Baldev of Food in the Last Year: Not on file   Transportation Needs:     Lack of Transportation (Medical): Not on file    Lack of Transportation (Non-Medical): Not on file   Physical Activity:     Days of Exercise per Week: Not on file    Minutes of Exercise per Session: Not on file   Stress:     Feeling of Stress : Not on file   Social Connections:     Frequency of Communication with Friends and Family: Not on file    Frequency of Social Gatherings with Friends and Family: Not on file    Attends [employer] Services: Not on file    Active Member of [address] [place] [address] [employer] or Organizations: Not on file    Attends Club or Organization Meetings: Not on file    Marital Status: Not on file   Intimate Partner Violence:     Fear of Current or Ex-Partner: Not on file    Emotionally Abused: Not on file    Physically Abused: Not on file    Sexually Abused: Not on file   Housing Stability:     Unable to Pay for Housing in the Last Year: Not on file    Number of [place] in the Last Year: Not on file    Unstable Housing in the Last Year: Not on file         ALLERGIES: Sulfa (sulfonamide antibiotics)    Review of Systems   Constitutional: Positive for fatigue. Gastrointestinal: Positive for diarrhea. All other systems reviewed and are negative. Vitals:    04/13/22 1346   BP: 128/71   Pulse: 73   Temp: 98 °F (36.7 °C)   SpO2: 99%   Weight: 59 kg (130 lb)   Height: 5' 5\" (1.651 m)            Physical Exam  Constitutional:       Appearance: Normal appearance. HENT:      Head: Normocephalic and atraumatic. Nose: Nose normal.      Mouth/Throat:      Mouth: Mucous membranes are moist.   Eyes:      General: No scleral icterus. Cardiovascular:      Rate and Rhythm: Normal rate.    Pulmonary:      Effort: Pulmonary effort is normal. Abdominal:      General: Abdomen is flat. Tenderness: There is no abdominal tenderness. Musculoskeletal:         General: No deformity. Skin:     General: Skin is warm. Capillary Refill: Capillary refill takes less than 2 seconds. Neurological:      Mental Status: He is alert and oriented to person, place, and time. Psychiatric:         Mood and Affect: Mood normal.          MDM     VITAL SIGNS:  No data found. LABS:  Recent Results (from the past 6 hour(s))   URINALYSIS W/MICROSCOPIC    Collection Time: 04/13/22  3:30 PM   Result Value Ref Range    Color YELLOW/STRAW      Appearance CLEAR CLEAR      Specific gravity 1.009 1.003 - 1.030      pH (UA) 6.5 5.0 - 8.0      Protein Negative NEG mg/dL    Glucose Negative NEG mg/dL    Ketone Negative NEG mg/dL    Bilirubin Negative NEG      Blood Negative NEG      Urobilinogen 0.2 0.2 - 1.0 EU/dL    Nitrites Negative NEG      Leukocyte Esterase Negative NEG      WBC 0-4 0 - 4 /hpf    RBC 0-5 0 - 5 /hpf    Epithelial cells FEW FEW /lpf    Bacteria Negative NEG /hpf    Hyaline cast 0-2 0 - 2 /lpf   URINE CULTURE HOLD SAMPLE    Collection Time: 04/13/22  3:30 PM    Specimen: Serum; Urine   Result Value Ref Range    Urine culture hold        Urine on hold in Microbiology dept for 2 days. If unpreserved urine is submitted, it cannot be used for addtional testing after 24 hours, recollection will be required. IMAGING:  No orders to display         Medications During Visit:  Medications   lactated ringers bolus infusion 1,000 mL (0 mL IntraVENous IV Completed 4/13/22 7556)         DECISION MAKING:  Saul Fraser is a 80 y.o. male who comes in as above. Here, patient appears well. Diagnostics unremarkable. Patient shows no signs of stress, is awake alert and oriented. Case management did speak with the patient's family about options as they are concerned for him living by himself. At this time patient will be discharged home.   Family is provided with multiple resources. Patient does not need hospital admission at this time      IMPRESSION:  1. Diarrhea, unspecified type        DISPOSITION:  Discharged      Discharge Medication List as of 4/13/2022  4:14 PM           Follow-up Information     Follow up With Specialties Details Why Contact Info    Shelbie Augustine MD Family Medicine Schedule an appointment as soon as possible for a visit   [address] [facility] [phone]              The patient is asked to follow-up with their primary care provider in the next several days. They are to call tomorrow for an appointment. The patient is asked to return promptly for any increased concerns or worsening of symptoms. They can return to this emergency department or any other emergency department.     Procedures

## 2022-04-13 NOTE — ED NOTES
Pt discharged with instructions and/or prescriptions reviewed and verbally understood. Pt ambulatory and stable upon discharge.

## 2022-04-13 NOTE — ED TRIAGE NOTES
Pt arrive to the ED with c/o diarrhea since Monday. He has been experiencing diarrhea for about 2x a day. He states that it is brown in color. Denies taking any ABX recently. Pt has not been eating and drinking for a couple of days, other than a half of bottle of water on the way here. Pt granddaughter states that she believes he is dehydrated.
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
## 2022-05-03 ENCOUNTER — TELEPHONE (OUTPATIENT)
Dept: FAMILY MEDICINE CLINIC | Age: 87
End: 2022-05-03

## 2022-05-03 NOTE — TELEPHONE ENCOUNTER
Pts son Armando Harris goes by Durward Hunting not on hippa but says he is POA. States he will bring in POA papers today to put on file. He called in stating his dad is in [employer] [facility] and then going to [employer]. He says his dad is losing weight and has no appetite and is wondering what can be done to help his appetite. Call back number for Armando Harris is [phone]. Thanks.

## 2022-05-03 NOTE — TELEPHONE ENCOUNTER
James Cedillo pts son returned your call, please call him at [phone] when you get a chance. Thanks.

## 2022-05-04 NOTE — TELEPHONE ENCOUNTER
Returned son's [employer]. Concerned of loss of appetite from patient during this transition of moving into assist living. Encouraged Julius Gilbert to have patient drink ensures, ice cream, creamy soups. They all have extra calories. Wait about two weeks. Usually patients transition well in their new routine and appetite picks up, etc. Julius Gilbert verbally agrees and verbalizes with plan. He will call us if he has any other concerns.

## 2022-05-16 ENCOUNTER — DOCUMENTATION ONLY (OUTPATIENT)
Dept: FAMILY MEDICINE CLINIC | Age: 87
End: 2022-05-16

## 2022-08-19 ENCOUNTER — DOCUMENTATION ONLY (OUTPATIENT)
Dept: FAMILY MEDICINE CLINIC | Age: 87
End: 2022-08-19

## 2022-08-29 ENCOUNTER — DOCUMENTATION ONLY (OUTPATIENT)
Dept: FAMILY MEDICINE CLINIC | Age: 87
End: 2022-08-29

## 2023-05-10 ENCOUNTER — HOSPITAL ENCOUNTER (EMERGENCY)
Facility: HOSPITAL | Age: 88
Discharge: HOME | End: 2023-05-11
Attending: STUDENT IN AN ORGANIZED HEALTH CARE EDUCATION/TRAINING PROGRAM
Payer: MEDICARE

## 2023-05-10 DIAGNOSIS — E87.5: ICD-10-CM

## 2023-05-10 DIAGNOSIS — N17.9 AKI (ACUTE KIDNEY INJURY) (HCC): ICD-10-CM

## 2023-05-10 DIAGNOSIS — E87.1 CHRONIC HYPONATREMIA: Primary | ICD-10-CM

## 2023-05-10 LAB
ALBUMIN SERPL-MCNC: 4 G/DL (ref 3.5–5.2)
ALBUMIN/GLOB SERPL: 1.3 (ref 1.1–2.2)
ALP SERPL-CCNC: 95 U/L (ref 40–129)
ALT SERPL-CCNC: 11 U/L (ref 10–50)
ANION GAP SERPL CALC-SCNC: 11 MMOL/L (ref 5–15)
ANION GAP SERPL CALC-SCNC: 9 MMOL/L (ref 5–15)
APPEARANCE UR: CLEAR
AST SERPL-CCNC: 14 U/L (ref 10–50)
BACTERIA URNS QL MICRO: NEGATIVE /HPF
BASOPHILS # BLD: 0 K/UL (ref 0–1)
BASOPHILS NFR BLD: 0 % (ref 0–1)
BILIRUB SERPL-MCNC: <0.2 MG/DL (ref 0.2–1)
BILIRUB UR QL: NEGATIVE
BUN SERPL-MCNC: 33 MG/DL (ref 8–23)
BUN SERPL-MCNC: 35 MG/DL (ref 8–23)
BUN/CREAT SERPL: 24 (ref 12–20)
BUN/CREAT SERPL: 25 (ref 12–20)
CALCIUM SERPL-MCNC: 8.7 MG/DL (ref 8.2–9.6)
CALCIUM SERPL-MCNC: 9 MG/DL (ref 8.2–9.6)
CHLORIDE SERPL-SCNC: 94 MMOL/L (ref 98–107)
CHLORIDE SERPL-SCNC: 98 MMOL/L (ref 98–107)
CO2 SERPL-SCNC: 23 MMOL/L (ref 22–29)
CO2 SERPL-SCNC: 23 MMOL/L (ref 22–29)
COLOR UR: NORMAL
COMMENT:: NORMAL
CREAT SERPL-MCNC: 1.34 MG/DL (ref 0.7–1.2)
CREAT SERPL-MCNC: 1.47 MG/DL (ref 0.7–1.2)
DIFFERENTIAL METHOD BLD: ABNORMAL
EOSINOPHIL # BLD: 0.1 K/UL (ref 0–0.4)
EOSINOPHIL NFR BLD: 1 % (ref 0–7)
EPITH CASTS URNS QL MICRO: NORMAL /LPF
ERYTHROCYTE [DISTWIDTH] IN BLOOD BY AUTOMATED COUNT: 13.2 % (ref 11.5–14.5)
GLOBULIN SER CALC-MCNC: 3 G/DL (ref 2–4)
GLUCOSE SERPL-MCNC: 84 MG/DL (ref 65–100)
GLUCOSE SERPL-MCNC: 93 MG/DL (ref 65–100)
GLUCOSE UR STRIP.AUTO-MCNC: NEGATIVE MG/DL
HCT VFR BLD AUTO: 35 % (ref 36.6–50.3)
HGB BLD-MCNC: 12.1 G/DL (ref 12.1–17)
HGB UR QL STRIP: NEGATIVE
IMM GRANULOCYTES # BLD AUTO: 0 K/UL (ref 0–0.04)
IMM GRANULOCYTES NFR BLD AUTO: 0 % (ref 0–0.5)
KETONES UR QL STRIP.AUTO: NEGATIVE MG/DL
LEUKOCYTE ESTERASE UR QL STRIP.AUTO: NEGATIVE
LIPASE SERPL-CCNC: 63 U/L (ref 13–60)
LYMPHOCYTES # BLD: 1.5 K/UL (ref 0.8–3.5)
LYMPHOCYTES NFR BLD: 21 % (ref 12–49)
MCH RBC QN AUTO: 27.9 PG (ref 26–34)
MCHC RBC AUTO-ENTMCNC: 34.6 G/DL (ref 30–36.5)
MCV RBC AUTO: 80.6 FL (ref 80–99)
MONOCYTES # BLD: 0.9 K/UL (ref 0–1)
MONOCYTES NFR BLD: 12 % (ref 5–13)
NEUTS SEG # BLD: 4.8 K/UL (ref 1.8–8)
NEUTS SEG NFR BLD: 66 % (ref 32–75)
NITRITE UR QL STRIP.AUTO: NEGATIVE
NRBC # BLD: 0 K/UL (ref 0–0.01)
NRBC BLD-RTO: 0 PER 100 WBC
PH UR STRIP: 5 (ref 5–8)
PLATELET # BLD AUTO: 255 K/UL (ref 150–400)
PMV BLD AUTO: 8.2 FL (ref 8.9–12.9)
POTASSIUM SERPL-SCNC: 5.6 MMOL/L (ref 3.5–5.1)
POTASSIUM SERPL-SCNC: 5.8 MMOL/L (ref 3.5–5.1)
PROT SERPL-MCNC: 7 G/DL (ref 6.4–8.3)
PROT UR STRIP-MCNC: NEGATIVE MG/DL
RBC # BLD AUTO: 4.34 M/UL (ref 4.1–5.7)
RBC #/AREA URNS HPF: NORMAL /HPF (ref 0–5)
SODIUM SERPL-SCNC: 128 MMOL/L (ref 136–145)
SODIUM SERPL-SCNC: 130 MMOL/L (ref 136–145)
SP GR UR REFRACTOMETRY: 1.02 (ref 1–1.03)
SPECIMEN HOLD: NORMAL
URINE CULTURE IF INDICATED: NORMAL
UROBILINOGEN UR QL STRIP.AUTO: 0.2 EU/DL (ref 0.2–1)
WBC # BLD AUTO: 7.3 K/UL (ref 4.1–11.1)
WBC URNS QL MICRO: NORMAL /HPF (ref 0–4)

## 2023-05-10 PROCEDURE — 2580000003 HC RX 258: Performed by: EMERGENCY MEDICINE

## 2023-05-10 PROCEDURE — 96361 HYDRATE IV INFUSION ADD-ON: CPT

## 2023-05-10 PROCEDURE — 99284 EMERGENCY DEPT VISIT MOD MDM: CPT

## 2023-05-10 PROCEDURE — 80048 BASIC METABOLIC PNL TOTAL CA: CPT

## 2023-05-10 PROCEDURE — 96360 HYDRATION IV INFUSION INIT: CPT

## 2023-05-10 PROCEDURE — 6370000000 HC RX 637 (ALT 250 FOR IP): Performed by: EMERGENCY MEDICINE

## 2023-05-10 PROCEDURE — 83690 ASSAY OF LIPASE: CPT

## 2023-05-10 PROCEDURE — 80053 COMPREHEN METABOLIC PANEL: CPT

## 2023-05-10 PROCEDURE — 81001 URINALYSIS AUTO W/SCOPE: CPT

## 2023-05-10 PROCEDURE — 93005 ELECTROCARDIOGRAM TRACING: CPT | Performed by: STUDENT IN AN ORGANIZED HEALTH CARE EDUCATION/TRAINING PROGRAM

## 2023-05-10 PROCEDURE — 36415 COLL VENOUS BLD VENIPUNCTURE: CPT

## 2023-05-10 PROCEDURE — 2580000003 HC RX 258: Performed by: STUDENT IN AN ORGANIZED HEALTH CARE EDUCATION/TRAINING PROGRAM

## 2023-05-10 PROCEDURE — 85025 COMPLETE CBC W/AUTO DIFF WBC: CPT

## 2023-05-10 RX ORDER — 0.9 % SODIUM CHLORIDE 0.9 %
1000 INTRAVENOUS SOLUTION INTRAVENOUS ONCE
Status: COMPLETED | OUTPATIENT
Start: 2023-05-10 | End: 2023-05-10

## 2023-05-10 RX ORDER — SODIUM POLYSTYRENE SULFONATE 15 G/60ML
15 SUSPENSION ORAL; RECTAL ONCE
Status: COMPLETED | OUTPATIENT
Start: 2023-05-10 | End: 2023-05-10

## 2023-05-10 RX ADMIN — SODIUM CHLORIDE 1000 ML: 9 INJECTION, SOLUTION INTRAVENOUS at 21:45

## 2023-05-10 RX ADMIN — SODIUM POLYSTYRENE SULFONATE 15 G: 15 SUSPENSION ORAL; RECTAL at 21:48

## 2023-05-10 RX ADMIN — SODIUM CHLORIDE 1000 ML: 9 INJECTION, SOLUTION INTRAVENOUS at 18:42

## 2023-05-10 RX ADMIN — SODIUM CHLORIDE 1000 ML: 9 INJECTION, SOLUTION INTRAVENOUS at 21:46

## 2023-05-10 ASSESSMENT — PAIN SCALES - GENERAL: PAINLEVEL_OUTOF10: 0

## 2023-05-10 ASSESSMENT — PAIN DESCRIPTION - ORIENTATION: ORIENTATION: RIGHT;LOWER

## 2023-05-10 ASSESSMENT — PAIN - FUNCTIONAL ASSESSMENT: PAIN_FUNCTIONAL_ASSESSMENT: 0-10

## 2023-05-10 ASSESSMENT — PAIN DESCRIPTION - LOCATION: LOCATION: ABDOMEN

## 2023-05-10 NOTE — ED NOTES
Patient updated on plan of care, and given opportunity to ask questions. Patient verbalizes understanding. Patient is currently resting, no needs expressed at this time. Bed is in the low position, wheels are locked, and call bell is within reach.        Chinyere Rodriguez RN  05/10/23 4930

## 2023-05-10 NOTE — ED NOTES
Verbal shift change report given to [address] [place] [address] (oncoming nurse) by Mary Elmore (offgoing nurse). Report included the following information Nurse Handoff Report, ED Encounter Summary, ED SBAR, Adult Overview, MAR, Recent Results, and Neuro Assessment.         Sarina Funes RN  05/10/23 5866

## 2023-05-10 NOTE — ED NOTES
Bedside and Verbal shift change report given to Benjamin Hardin RN  (oncoming nurse) by Linh Nichole (offgoing nurse). Report included the following information Nurse Handoff Report and Recent Results.         Benjamin Hardin RN  05/10/23 7722

## 2023-05-10 NOTE — ED TRIAGE NOTES
Patient arrives via EMS from the [address] living [facility], with c/o RLQ abdominal pain that started this morning. Reports having 3 bowel movements today. Denies constipation, diarrhea, chest pain, SOB, N/V, fever, urinary changes. Patient is A&O x 4.

## 2023-05-10 NOTE — ED NOTES
Patient connected to cardiac monitor, ED 11. Alarms are on and audible.      Sarina Funes RN  05/10/23 5008

## 2023-05-11 VITALS
RESPIRATION RATE: 13 BRPM | HEIGHT: 67 IN | DIASTOLIC BLOOD PRESSURE: 95 MMHG | BODY MASS INDEX: 24.78 KG/M2 | WEIGHT: 157.9 LBS | SYSTOLIC BLOOD PRESSURE: 162 MMHG | HEART RATE: 100 BPM | OXYGEN SATURATION: 94 % | TEMPERATURE: 98 F

## 2023-05-11 LAB
ANION GAP SERPL CALC-SCNC: 10 MMOL/L (ref 5–15)
BUN SERPL-MCNC: 30 MG/DL (ref 8–23)
BUN/CREAT SERPL: 25 (ref 12–20)
CALCIUM SERPL-MCNC: 8.2 MG/DL (ref 8.2–9.6)
CHLORIDE SERPL-SCNC: 104 MMOL/L (ref 98–107)
CO2 SERPL-SCNC: 21 MMOL/L (ref 22–29)
CREAT SERPL-MCNC: 1.22 MG/DL (ref 0.7–1.2)
GLUCOSE SERPL-MCNC: 90 MG/DL (ref 65–100)
POTASSIUM SERPL-SCNC: 5 MMOL/L (ref 3.5–5.1)
SODIUM SERPL-SCNC: 135 MMOL/L (ref 136–145)

## 2023-05-11 NOTE — ED PROVIDER NOTES
History of Present Illness:  See Dr. Franchesca Verde note for history    Past Medical History:   Diagnosis Date    CAD (coronary artery disease)     Cancer ([address]     prostate    Hypercholesterolemia     Hypertension        Past Surgical History:   Procedure Laterality Date    CATARACT REMOVAL      Left and Right    COLONOSCOPY      [facility] -- 5 year fu    COLONOSCOPY      2016 Dr. Aylin Diaz  2001    [address], 2009    PROSTATECTOMY  1995    TONSILLECTOMY           Family History   Problem Relation Age of Onset    Cancer Sister         Brain    Stroke Brother     Cancer Brother         colon    Cancer Mother         Colon    Heart Disease Father     Diabetes Brother        Social History     Socioeconomic History    Marital status:      Spouse name: Not on file    Number of children: Not on file    Years of education: Not on file    Highest education level: Not on file   Occupational History    Not on file   Tobacco Use    Smoking status: Former     Types: Cigarettes     Quit date: 1970     Years since quittin.9    Smokeless tobacco: Never   Substance and Sexual Activity    Alcohol use:  Yes     Alcohol/week: 5.8 standard drinks    Drug use: No    Sexual activity: Not on file   Other Topics Concern    Not on file   Social History Narrative    Not on file     Social Determinants of Health     Financial Resource Strain: Not on file   Food Insecurity: Not on file   Transportation Needs: Not on file   Physical Activity: Not on file   Stress: Not on file   Social Connections: Not on file   Intimate Partner Violence: Not on file   Housing Stability: Not on file         ALLERGIES: Sulfa antibiotics      Vitals:    05/10/23 2115 05/10/23 2145 05/10/23 2158 05/10/23 2228   BP:   (!) 134/115 (!) 162/95   Pulse: 77 79 89 (!) 108   Resp: 12 25 14 18   Temp:       TempSrc:       SpO2: 99% 100% 99% 96%   Weight:       Height:                Physical Exam   See

## 2023-05-11 NOTE — DISCHARGE INSTRUCTIONS
Please stay well-hydrated, follow-up with your primary care doctor.   Return for new or worsening symptoms

## 2023-05-11 NOTE — ED NOTES
Patient discharged waiting for son to pick him up. Patient noted to be making multiple trips to the bathroom.       Shahriar Hardin RN  05/11/23 6286

## 2023-05-11 NOTE — ED PROVIDER NOTES
[facility] EMERGENCY DEPT  EMERGENCY DEPARTMENT ENCOUNTER      Pt Name: Keerthi Fernandez  MRN: [id number]  Fadigfphyllis 11/13/1930  Date of evaluation: 5/10/2023  Provider: Kobi Cline MD    CHIEF COMPLAINT       Chief Complaint   Patient presents with    Abdominal Pain         HISTORY OF PRESENT ILLNESS   (Location/Symptom, Timing/Onset, Context/Setting, Quality, Duration, Modifying Factors, Severity)  Note limiting factors. Patient is a 20-year-old male present emergency department with an episode of right lower quadrant abdominal pain. Patient states that he had an episode of pain in the right lower quadrant on arrival it is resolved. Patient states \"can I go home now\" patient denies any dizziness, lightheadedness nausea vomiting or diarrhea. Patient patient states that he feels completely fine at this point. The history is provided by the patient. Review of External Medical Records:     Nursing Notes were reviewed. REVIEW OF SYSTEMS    (2-9 systems for level 4, 10 or more for level 5)     Review of Systems    Except as noted above the remainder of the review of systems was reviewed and negative.        PAST MEDICAL HISTORY     Past Medical History:   Diagnosis Date    CAD (coronary artery disease)     Cancer ([facility] [address]     prostate    Hypercholesterolemia     Hypertension          SURGICAL HISTORY       Past Surgical History:   Procedure Laterality Date    CATARACT REMOVAL      Left and Right    COLONOSCOPY  2008    [facility]ando -- 5 year fu    COLONOSCOPY      12/2016 Dr. Jayce Garcia  4/9/2001    [address], 2009    PROSTATECTOMY  1995    TONSILLECTOMY           CURRENT MEDICATIONS       Previous Medications    ASPIRIN 81 MG EC TABLET    Take by mouth daily    DICLOFENAC (VOLTAREN) 75 MG EC TABLET    Take by mouth 2 times daily as needed    LEVOTHYROXINE (SYNTHROID) 112 MCG TABLET    TAKE 1 TABLET BY MOUTH DAILY BEFORE BREAKFAST    LISINOPRIL (PRINIVIL;ZESTRIL) 20 MG

## 2023-05-13 LAB
EKG ATRIAL RATE: 69 BPM
EKG DIAGNOSIS: NORMAL
EKG P AXIS: 14 DEGREES
EKG P-R INTERVAL: 180 MS
EKG Q-T INTERVAL: 346 MS
EKG QRS DURATION: 80 MS
EKG QTC CALCULATION (BAZETT): 370 MS
EKG R AXIS: -15 DEGREES
EKG T AXIS: 18 DEGREES
EKG VENTRICULAR RATE: 69 BPM

## 2023-05-13 PROCEDURE — 93010 ELECTROCARDIOGRAM REPORT: CPT | Performed by: SPECIALIST

## 2023-05-25 ENCOUNTER — TELEPHONE (OUTPATIENT)
Age: 88
End: 2023-05-25

## 2023-05-25 NOTE — TELEPHONE ENCOUNTER
Patient put on schedule for 6/26/2023 with Dr. 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 Yamile, from the [address], where patient resides states they had thought a sooner appt could be available because now patient went to ED twice for same pain. They could do blood work at the [facility] if Dr. Gagandeep Jacques would like to order them prior to appointment. Call yamile [phone]. Mann/LINO CT scan on Dr. Tasia Dejesus desk.

## 2023-05-25 NOTE — TELEPHONE ENCOUNTER
Received call from patient's Care Facility ([facility]) Lead . Zhane Osuna)     [place] states that patient went to [facility] ED 5/10/23 for RLQ abdominal pain. Appendicitis ruled out. Abnormal labs obtained. Patient sent home without noted relief. Patient went out again 5/23/23 to [facility] ED. CT scan revealed cyst on pancreas. Result received by [facility] via fax. Patient and family requesting ED follow up visit for follow up CT scan order, recommendations for potassium/sodium abnormalities or referral to specialist.    [facility] Patient  Zhane Osuna) States pain intermittent. Patient NOT currently in pain per Yamile. States understanding of no immediate OV appointment available for ED follow up. Requesting call back with recommendation.   [phone]

## 2023-05-25 NOTE — TELEPHONE ENCOUNTER
Sheldon Ovalle at [facility]. Rescheduled appt with Dr. Juan F Thacker for Wed, 5/31/2023 as per Dr. Juan F Thacker,.

## 2023-05-30 ENCOUNTER — TELEPHONE (OUTPATIENT)
Age: 88
End: 2023-05-30

## 2023-05-31 ENCOUNTER — OFFICE VISIT (OUTPATIENT)
Age: 88
End: 2023-05-31
Payer: MEDICARE

## 2023-05-31 VITALS
SYSTOLIC BLOOD PRESSURE: 152 MMHG | BODY MASS INDEX: 23.54 KG/M2 | RESPIRATION RATE: 16 BRPM | TEMPERATURE: 97.4 F | HEART RATE: 72 BPM | HEIGHT: 67 IN | OXYGEN SATURATION: 96 % | WEIGHT: 150 LBS | DIASTOLIC BLOOD PRESSURE: 71 MMHG

## 2023-05-31 DIAGNOSIS — F03.90 DEMENTIA WITHOUT BEHAVIORAL DISTURBANCE (HCC): ICD-10-CM

## 2023-05-31 DIAGNOSIS — I10 PRIMARY HYPERTENSION: ICD-10-CM

## 2023-05-31 DIAGNOSIS — E87.5 HYPERKALEMIA: ICD-10-CM

## 2023-05-31 DIAGNOSIS — E03.9 ACQUIRED HYPOTHYROIDISM: ICD-10-CM

## 2023-05-31 DIAGNOSIS — R10.84 GENERALIZED ABDOMINAL PAIN: ICD-10-CM

## 2023-05-31 DIAGNOSIS — E87.1 HYPONATREMIA: Primary | ICD-10-CM

## 2023-05-31 PROCEDURE — 99214 OFFICE O/P EST MOD 30 MIN: CPT | Performed by: FAMILY MEDICINE

## 2023-05-31 PROCEDURE — 1123F ACP DISCUSS/DSCN MKR DOCD: CPT | Performed by: FAMILY MEDICINE

## 2023-05-31 PROCEDURE — 1036F TOBACCO NON-USER: CPT | Performed by: FAMILY MEDICINE

## 2023-05-31 PROCEDURE — G8427 DOCREV CUR MEDS BY ELIG CLIN: HCPCS | Performed by: FAMILY MEDICINE

## 2023-05-31 PROCEDURE — G8420 CALC BMI NORM PARAMETERS: HCPCS | Performed by: FAMILY MEDICINE

## 2023-05-31 ASSESSMENT — PATIENT HEALTH QUESTIONNAIRE - PHQ9
SUM OF ALL RESPONSES TO PHQ QUESTIONS 1-9: 0
SUM OF ALL RESPONSES TO PHQ QUESTIONS 1-9: 0
2. FEELING DOWN, DEPRESSED OR HOPELESS: 0
SUM OF ALL RESPONSES TO PHQ9 QUESTIONS 1 & 2: 0
SUM OF ALL RESPONSES TO PHQ QUESTIONS 1-9: 0
SUM OF ALL RESPONSES TO PHQ QUESTIONS 1-9: 0
1. LITTLE INTEREST OR PLEASURE IN DOING THINGS: 0

## 2023-05-31 NOTE — PROGRESS NOTES
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 Patient states he feels sensitivity in abdomen, but no pain like before. 1. Have you been to the ER, urgent care clinic since your last visit? Hospitalized since your last visit? No    2. Have you seen or consulted any other health care providers outside of the [address] [place] [address] since your last visit? Include any pap smears or colon screening.  No

## 2023-05-31 NOTE — PROGRESS NOTES
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 Patient states he feels sensitivity in abdomen, but no pain like before. 1. Have you been to the ER, urgent care clinic since your last visit? Hospitalized since your last visit? No    2. Have you seen or consulted any other health care providers outside of the [address] [place] [address] since your last visit? Include any pap smears or colon screening. No  Chief Complaint   Patient presents with    Follow-Up from [facility]. Pain, hyponatremia, hyperkalemia     He is a 80 y.o. male who presents for evalution. Reviewed PmHx, RxHx, FmHx, SocHx, AllgHx and updated and dated in the chart.   CURRENT MEDS W/ ASSOC DIAG           Start Date End Date     aspirin 81 MG EC tablet  --  --     Associated Diagnoses:  --     diclofenac (VOLTAREN) 75 MG EC tablet  02/19/21  --     Associated Diagnoses:  --     levothyroxine (SYNTHROID) 112 MCG tablet  01/06/22  --     Associated Diagnoses:  --     lisinopril (PRINIVIL;ZESTRIL) 20 MG tablet  01/07/22  --     Associated Diagnoses:  --     LORazepam (ATIVAN) 2 MG tablet  04/01/22  --     Associated Diagnoses:  --             Patient Active Problem List   Diagnosis Code    Primary hypertension I10    Acute hyponatremia E87.1    Advance care planning Z71.89    Episode of recurrent major depressive disorder ([facility] [address] F33.9    Insomnia G47.00    Mixed hyperlipidemia E78.2    Ischemic colitis ([address] K55.9    Stress incontinence of urine N39.3    Diverticulitis of large intestine with perforation and abscess without bleeding K57.20    Coronary atherosclerosis of native coronary artery I25.10    Cancer (HCC) C80.1    Acquired hypothyroidism E03.9    Dementia without behavioral disturbance ([facility] [address] F03.90        Review of Systems - negative except as listed above in the HPI    Objective:     Vitals:    05/31/23 1327   BP: (!) 152/71   Pulse: 72   Resp: 16   Temp:

## 2023-06-01 LAB
ALBUMIN SERPL-MCNC: 4 G/DL (ref 3.5–5)
ALBUMIN/GLOB SERPL: 1.3 (ref 1.1–2.2)
ALP SERPL-CCNC: 94 U/L (ref 45–117)
ALT SERPL-CCNC: 17 U/L (ref 12–78)
ANION GAP SERPL CALC-SCNC: 5 MMOL/L (ref 5–15)
AST SERPL-CCNC: 12 U/L (ref 15–37)
BILIRUB SERPL-MCNC: 0.2 MG/DL (ref 0.2–1)
BUN SERPL-MCNC: 37 MG/DL (ref 6–20)
BUN/CREAT SERPL: 22 (ref 12–20)
CALCIUM SERPL-MCNC: 9.2 MG/DL (ref 8.5–10.1)
CHLORIDE SERPL-SCNC: 97 MMOL/L (ref 97–108)
CO2 SERPL-SCNC: 25 MMOL/L (ref 21–32)
CREAT SERPL-MCNC: 1.7 MG/DL (ref 0.7–1.3)
GLOBULIN SER CALC-MCNC: 3.2 G/DL (ref 2–4)
GLUCOSE SERPL-MCNC: 87 MG/DL (ref 65–100)
POTASSIUM SERPL-SCNC: 6 MMOL/L (ref 3.5–5.1)
PROT SERPL-MCNC: 7.2 G/DL (ref 6.4–8.2)
SODIUM SERPL-SCNC: 127 MMOL/L (ref 136–145)

## 2023-06-03 LAB — TSH SERPL DL<=0.05 MIU/L-ACNC: 1.93 UIU/ML (ref 0.45–4.5)

## 2023-06-26 ENCOUNTER — TELEPHONE (OUTPATIENT)
Age: 88
End: 2023-06-26

## 2023-06-26 ENCOUNTER — OFFICE VISIT (OUTPATIENT)
Age: 88
End: 2023-06-26
Payer: MEDICARE

## 2023-06-26 VITALS
RESPIRATION RATE: 14 BRPM | HEART RATE: 64 BPM | HEIGHT: 67 IN | SYSTOLIC BLOOD PRESSURE: 121 MMHG | DIASTOLIC BLOOD PRESSURE: 67 MMHG | OXYGEN SATURATION: 97 % | BODY MASS INDEX: 23.1 KG/M2 | WEIGHT: 147.2 LBS | TEMPERATURE: 97.7 F

## 2023-06-26 DIAGNOSIS — K55.9 VASCULAR DISORDER OF INTESTINE, UNSPECIFIED (HCC): ICD-10-CM

## 2023-06-26 DIAGNOSIS — C80.1 MALIGNANT (PRIMARY) NEOPLASM, UNSPECIFIED (HCC): ICD-10-CM

## 2023-06-26 DIAGNOSIS — F03.90 DEMENTIA, UNSPECIFIED DEMENTIA SEVERITY, UNSPECIFIED DEMENTIA TYPE, UNSPECIFIED WHETHER BEHAVIORAL, PSYCHOTIC, OR MOOD DISTURBANCE OR ANXIETY (HCC): ICD-10-CM

## 2023-06-26 DIAGNOSIS — F51.01 PRIMARY INSOMNIA: ICD-10-CM

## 2023-06-26 DIAGNOSIS — Z00.01 ENCOUNTER FOR MEDICARE ANNUAL EXAMINATION WITH ABNORMAL FINDINGS: Primary | ICD-10-CM

## 2023-06-26 DIAGNOSIS — I10 PRIMARY HYPERTENSION: ICD-10-CM

## 2023-06-26 DIAGNOSIS — I25.10 ATHEROSCLEROSIS OF NATIVE CORONARY ARTERY OF NATIVE HEART WITHOUT ANGINA PECTORIS: ICD-10-CM

## 2023-06-26 DIAGNOSIS — E87.1 ACUTE HYPONATREMIA: ICD-10-CM

## 2023-06-26 DIAGNOSIS — E78.2 MIXED HYPERLIPIDEMIA: ICD-10-CM

## 2023-06-26 DIAGNOSIS — F33.9 EPISODE OF RECURRENT MAJOR DEPRESSIVE DISORDER, UNSPECIFIED DEPRESSION EPISODE SEVERITY (HCC): ICD-10-CM

## 2023-06-26 DIAGNOSIS — E87.5 HYPERKALEMIA: ICD-10-CM

## 2023-06-26 DIAGNOSIS — E03.9 ACQUIRED HYPOTHYROIDISM: ICD-10-CM

## 2023-06-26 PROCEDURE — 1123F ACP DISCUSS/DSCN MKR DOCD: CPT | Performed by: FAMILY MEDICINE

## 2023-06-26 PROCEDURE — 1036F TOBACCO NON-USER: CPT | Performed by: FAMILY MEDICINE

## 2023-06-26 PROCEDURE — G0439 PPPS, SUBSEQ VISIT: HCPCS | Performed by: FAMILY MEDICINE

## 2023-06-26 PROCEDURE — 99214 OFFICE O/P EST MOD 30 MIN: CPT | Performed by: FAMILY MEDICINE

## 2023-06-26 PROCEDURE — G8420 CALC BMI NORM PARAMETERS: HCPCS | Performed by: FAMILY MEDICINE

## 2023-06-26 PROCEDURE — G8427 DOCREV CUR MEDS BY ELIG CLIN: HCPCS | Performed by: FAMILY MEDICINE

## 2023-06-26 RX ORDER — TRAZODONE HYDROCHLORIDE 50 MG/1
50 TABLET ORAL NIGHTLY
COMMUNITY

## 2023-06-26 ASSESSMENT — LIFESTYLE VARIABLES
HOW OFTEN DO YOU HAVE A DRINK CONTAINING ALCOHOL: NEVER
HOW MANY STANDARD DRINKS CONTAINING ALCOHOL DO YOU HAVE ON A TYPICAL DAY: PATIENT DOES NOT DRINK

## 2023-08-29 ENCOUNTER — TELEPHONE (OUTPATIENT)
Age: 88
End: 2023-08-29

## 2023-08-29 NOTE — TELEPHONE ENCOUNTER
Patient called. He states he is in an assisted living facility and this morning they gave his medication to him crushed up and he could not identify them. He was very upset. He also states that when he leaves to visit family, he doesn't have any medication with him because they take it and does not let him take his own. He states he wanted Dr. Matthias Moss to be in charge of his meds. I informed him living in a facility like that, they have their own providers and nurses and rules. He would have to talk to them about medication issues. I also informed him that since he knows his medication , he could let them know not to crush them up until he identifies them. He has that right. Patient verbalizes understanding.

## 2023-08-29 NOTE — TELEPHONE ENCOUNTER
Joshua Escalante/[facility] requesting PT and OT order from provider. Ms Escalante's phone: [phone] Fax: [phone]. Patient is in the [facility].

## 2023-08-30 NOTE — TELEPHONE ENCOUNTER
Joshua Escalante/[facility] [address] requesting PT and OT order from provider. Ms Escalante's phone: [phone] Fax: [phone]. Patient is in the [facility]. Returned call to Home [facility]. Orders for PT/OT consult faxed to [phone] so patient can get PT at [facility]. Order confirmed. Also spoke to [facility] about patient call yesterday about his medications and how he got them crushed up the other day and could not identify and also, when he goes out to see his family , they do not let him get his own medication. Alexandra states she will follow up with the patient and the [facility].

## 2023-09-26 ENCOUNTER — TELEPHONE (OUTPATIENT)
Age: 88
End: 2023-09-26

## 2023-09-26 NOTE — TELEPHONE ENCOUNTER
----- Message from Chasity Nair sent at 9/25/2023  4:31 PM EDT -----  Subject: Message to Provider    QUESTIONS  Information for Provider? Patient would like to have his prescriptions   sent to him so he can fill them himself and not go through [facility]. He   doesn't feel comfortable taking medication from all the different people   coming in. Please call and advise. Please send his medication to   [facility]. Patient would like to hear from Dr. Wilder Meckel as soon as possible.   ---------------------------------------------------------------------------  --------------  Dagoberto [place]  [phone]; OK to leave message on voicemail  ---------------------------------------------------------------------------  --------------  SCRIPT ANSWERS  Relationship to Patient?  Self

## 2023-09-26 NOTE — TELEPHONE ENCOUNTER
Left voicemail for patient to return call to office regarding medications he wants sent to [facility] instead of staff at the [facility] to supply him. 1st attempt.

## 2023-09-27 NOTE — TELEPHONE ENCOUNTER
Attempted to call patient. Mailbox full. Need to discuss medication that he wants sent to [facility]. Reminded to return call to office if he still needs needs meds sent.

## 2023-10-02 ENCOUNTER — TELEPHONE (OUTPATIENT)
Age: 88
End: 2023-10-02

## 2023-10-02 NOTE — TELEPHONE ENCOUNTER
----- Message from Antonimelissa Hannah sent at 10/2/2023  3:02 PM EDT -----  Subject: Message to Provider    QUESTIONS  Information for Provider? Patients donnie Perez called in   today he is not on HIPAA but is the patients POA. He was asking if we   received a referral request for his dad for physical therapy from [facility]   at [facility]? [facility]\"" is the physical therapist   and the request should have been sent over on Friday. Opal Angel just wanted   to know if we received it or if he should reach back out to [place] to   have her resend it.   ---------------------------------------------------------------------------  --------------  [address] [place] [address] [place]  [phone]; OK to leave message on voicemail  ---------------------------------------------------------------------------  --------------  SCRIPT ANSWERS  Relationship to Patient? Other/Third Party  Representative Name? donnie Brown  Is the representative on the Communication Release of Information ([employer])   form in Epic?  Yes

## 2023-10-02 NOTE — TELEPHONE ENCOUNTER
Returned call to POA son Alf Hernandez. Discuss need for PT/ OT, appt made for 10/17/2023. He will have transportation set up by the [facility]. Son states [facility] because he was over-medicating himself, so he would like for us to disregard patient's request to have medications sent to [place]. Patient does not drive and no one would be able to pick his medications up from [place] anyway. He would like the [facility] to continue administering patient's medication.

## 2023-10-03 ENCOUNTER — CLINICAL DOCUMENTATION (OUTPATIENT)
Age: 88
End: 2023-10-03

## 2023-10-04 ENCOUNTER — CLINICAL DOCUMENTATION (OUTPATIENT)
Age: 88
End: 2023-10-04

## 2023-10-04 NOTE — PROGRESS NOTES
[place] [facility] PT/OT order was signed & faxed to [phone],ok,Copy placed in scan folder to be scanned to chart.

## 2023-10-24 ENCOUNTER — CLINICAL DOCUMENTATION (OUTPATIENT)
Age: 88
End: 2023-10-24

## 2023-11-01 ENCOUNTER — TELEMEDICINE (OUTPATIENT)
Age: 88
End: 2023-11-01
Payer: MEDICARE

## 2023-11-01 DIAGNOSIS — F03.90 DEMENTIA WITHOUT BEHAVIORAL DISTURBANCE (HCC): Primary | ICD-10-CM

## 2023-11-01 PROCEDURE — G8427 DOCREV CUR MEDS BY ELIG CLIN: HCPCS | Performed by: FAMILY MEDICINE

## 2023-11-01 PROCEDURE — 99213 OFFICE O/P EST LOW 20 MIN: CPT | Performed by: FAMILY MEDICINE

## 2023-11-01 PROCEDURE — 1123F ACP DISCUSS/DSCN MKR DOCD: CPT | Performed by: FAMILY MEDICINE

## 2023-11-01 PROCEDURE — 1036F TOBACCO NON-USER: CPT | Performed by: FAMILY MEDICINE

## 2023-11-01 PROCEDURE — G8420 CALC BMI NORM PARAMETERS: HCPCS | Performed by: FAMILY MEDICINE

## 2023-11-01 PROCEDURE — G8484 FLU IMMUNIZE NO ADMIN: HCPCS | Performed by: FAMILY MEDICINE

## 2023-11-01 NOTE — PROGRESS NOTES
Consent: Marisol Mcdonnell, who was seen by synchronous (real-time) audio-video technology, and/or his healthcare decision maker, is aware that this patient-initiated, Telehealth encounter on 11/1/2023 is a billable service, with coverage as determined by his insurance carrier. He is aware that he may receive a bill and has provided verbal consent to proceed: YES-Consent obtained within past 12 months  712    Prior to Admission medications    Medication Sig Start Date End Date Taking? Authorizing Provider   traZODone (DESYREL) 50 MG tablet Take 1 tablet by mouth nightly    Provider, MD Simona   levothyroxine (SYNTHROID) 112 MCG tablet TAKE 1 TABLET BY MOUTH DAILY BEFORE BREAKFAST 1/6/22   Automatic Reconciliation, Ar   lisinopril (PRINIVIL;ZESTRIL) 20 MG tablet Take by mouth daily 1/7/22   Automatic Reconciliation, Ar     Allergies   Allergen Reactions    Sulfa Antibiotics      Other reaction(s): Unknown (comments)  States that he is not allergic            Assessment & Plan:       ICD-10-CM    1. Dementia without behavioral disturbance ([address])  F03.90    Patient is requiring physical therapy to increase upper and lower extremity strength at the current facility he is living up. Advised to have forms sent and I will have them filled out. Time was used for level of billing: yes, 20 minutes reviewing previous notes, test results and office visit with the patient discussing the diagnosis and importance of compliance with the treatment plan as well as documenting on the day of the visit. Medication Side Effects and Warnings were discussed with patient  Patient Labs were reviewed and or requested:  Patient Past Records were reviewed and or requested              We discussed the expected course, resolution and complications of the diagnosis(es) in detail. Medication risks, benefits, costs, interactions, and alternatives were discussed as indicated.   I advised him to contact the office if his condition

## 2023-11-08 ENCOUNTER — TELEPHONE (OUTPATIENT)
Age: 88
End: 2023-11-08

## 2023-11-08 NOTE — TELEPHONE ENCOUNTER
Aj from [facility] needs office notes from 11/02/23 faxed to her. Fax #[phone]   Edgard can be reached #[phone]

## 2023-12-12 ENCOUNTER — CLINICAL DOCUMENTATION (OUTPATIENT)
Age: 88
End: 2023-12-12

## 2023-12-12 NOTE — PROGRESS NOTES
[facility] home health certification & POC were put on [facility] desk to process

## 2023-12-13 ENCOUNTER — CLINICAL DOCUMENTATION (OUTPATIENT)
Age: 88
End: 2023-12-13

## 2023-12-13 NOTE — PROGRESS NOTES
[facility] [facility] health certification & POC were signed & faxed to [phone],SYDNEE MCKEON placed in scan folder to be scanned to chart.

## 2023-12-28 ENCOUNTER — CLINICAL DOCUMENTATION (OUTPATIENT)
Age: 88
End: 2023-12-28

## 2024-01-01 ENCOUNTER — TELEPHONE (OUTPATIENT)
Age: 89
End: 2024-01-01

## 2024-01-01 ENCOUNTER — CLINICAL DOCUMENTATION (OUTPATIENT)
Age: 89
End: 2024-01-01

## 2024-01-03 ENCOUNTER — CLINICAL DOCUMENTATION (OUTPATIENT)
Age: 89
End: 2024-01-03

## 2024-01-03 NOTE — PROGRESS NOTES
[facility] request was signed & faxed to [phone],ok,Copy placed in scan folder to be scanned to chart.

## 2024-01-23 ENCOUNTER — CLINICAL DOCUMENTATION (OUTPATIENT)
Age: 89
End: 2024-01-23

## 2024-01-23 NOTE — PROGRESS NOTES
[facility] request was put on NASIR Sam's desk to process

## 2024-01-23 NOTE — PROGRESS NOTES
Hospital Discharge Follow-Up      Date/Time:  8/15/2019 10:13 AM    Patient was admitted to [facility] on 19 and discharged on 19 for acute hyponatremia. The physician discharge summary was available at the time of outreach. Patient was contacted within 2 business days of discharge. Top Challenges reviewed with the provider   Recheck BMP  Results for Pal Sexton (MRN [id number]) as of 2019 12:42   2019 14:23 2019 18:44   Sodium 132 (L) 119 (LL)   Potassium 4.1 4.3   Chloride 102 88 (L)   CO2 25 22   Anion gap 5 9   Glucose 139 (H) 87   BUN 11 19   Creatinine 0.99 1.12   BUN/Creatinine ratio 11 (L) 17   Calcium 8.0 (L) 7.9 (L)   GFR est non-AA >60 >60   GFR est AA >60 >60     Check BP - Prinzide d/c'd due to hyponatremia       Method of communication with provider :chart routing    Inpatient RRAT score: 8  Was this a readmission? no   Patient stated reason for the readmission: n/a    Nurse Navigator (NN) contacted the patient by telephone to perform post hospital discharge assessment. Verified name and  with patient as identifiers. Provided introduction to self, and explanation of the Nurse Navigator role. Reviewed discharge instructions and red flags with patient who verbalized understanding. Patient given an opportunity to ask questions and does not have any further questions or concerns at this time. The patient agrees to contact the PCP office for questions related to their healthcare. NN provided contact information for future reference. Disease Specific:   N/A    Summary of patient's top problems:  1. Acute Hypovolemic hypotonic hyponatremia- Na 116 on admission. Likely 2/2 diarrhea. Na improved to 132 at discharge after fluids. PCP to recheck BMP. 2. Diarrhea- Unkown etiology. Hx of diverticulitis and perforation from abscess. CT abdomen no acute process. No BM during admission. Fluid resuscitation provided.    3. Hypertension: Prinzide d/c'd due to hyponatremia. PCP to titrate lisinopril as needed.      Home Health orders at discharge: PT, OT, [address]: AT [address]  Date of initial visit: 8/16/19    Durable Medical Equipment ordered/company: [employer]  Durable Medical Equipment received: Rolling walker    Barriers to care? none identified    Advance Care Planning:   Does patient have an Advance Directive:  reviewed and current     Medication(s):   New Medications at Discharge: none  Changed Medications at Discharge: none  Discontinued Medications at Discharge: lisinopril-HCTZ    Medication reconciliation was performed with patient, who verbalizes understanding of administration of home medications. There were no barriers to obtaining medications identified at this time. Referral to Pharm D needed: no     Current Outpatient Medications   Medication Sig    lisinopril (PRINIVIL, ZESTRIL) 20 mg tablet Take 1 Tab by mouth daily.  Bifidobacterium Infantis (ALIGN) 4 mg cap Take 4 mg by mouth daily.  LORazepam (ATIVAN) 2 mg tablet Take 1 mg by mouth nightly.  levothyroxine (SYNTHROID) 112 mcg tablet TAKE 1 TABLET BY MOUTH DAILY BEFORE BREAKFAST    ondansetron (ZOFRAN ODT) 4 mg disintegrating tablet Take 4 mg by mouth every eight (8) hours as needed.  aspirin delayed-release 81 mg tablet Take 81 mg by mouth daily.  acetaminophen (TYLENOL) 325 mg tablet Take 325 mg by mouth every four (4) hours as needed for Pain.  dicyclomine (BENTYL) 10 mg capsule Take 10 mg by mouth every eight (8) hours as needed. No current facility-administered medications for this visit. There are no discontinued medications. [facility] follow up appointment(s):   Future Appointments   Date Time Provider Finn Martha   8/19/2019  1:15 PM Jagjit Rogers MD [facility] [address]      Non-[facility] follow up appointment(s): none  [facility]:  information provided as a resource       Goals      Attends follow-up appointments as directed.       8/15/19 - Patient will attend PCP appointment on Monday 8/19. PCP to recheck labs and BP. Will monitor.  YANNI Admission

## 2024-01-24 ENCOUNTER — CLINICAL DOCUMENTATION (OUTPATIENT)
Age: 89
End: 2024-01-24

## 2024-01-24 NOTE — PROGRESS NOTES
[facility] request was signed & faxed to [phone],ok,Copy placed in scan folder to be scanned to chart.

## 2024-02-01 ENCOUNTER — TELEPHONE (OUTPATIENT)
Age: 89
End: 2024-02-01

## 2024-02-01 NOTE — TELEPHONE ENCOUNTER
----- Message from Dinorah Rizo sent at 2/1/2024  3:53 PM EST -----  Subject: Referral Request    Reason for referral request? Physical Therapist for Routine Therapy  Provider patient wants to be referred to(if known):     Provider Phone Number(if known):    Additional Information for Provider? Patient stated he had seen a Physical   Therapist at [facility] in the past and he would like to see him again but   he is unsure of what his name is. Please advise.   ---------------------------------------------------------------------------  --------------  CALL BACK INFO    [phone]; OK to leave message on voicemail  ---------------------------------------------------------------------------  --------------

## 2024-02-05 DIAGNOSIS — I10 PRIMARY HYPERTENSION: Primary | ICD-10-CM

## 2024-02-14 ENCOUNTER — TELEPHONE (OUTPATIENT)
Age: 89
End: 2024-02-14

## 2024-02-14 NOTE — TELEPHONE ENCOUNTER
----- Message from Rosalind Granda sent at 2/14/2024  2:03 PM EST -----  Subject: Referral Request    Reason for referral request? Pt would like to get back in Physical Therapy   but they told him he needs a referral from Provider. Please call pt to   discuss this  Provider patient wants to be referred to(if known):     Provider Phone Number(if known):    Additional Information for Provider?   ---------------------------------------------------------------------------  --------------  CALL BACK INFO    [phone]; OK to leave message on voicemail  ---------------------------------------------------------------------------  --------------

## 2024-02-15 NOTE — TELEPHONE ENCOUNTER
Referral for PT Guillaume Eddy sent on 2/5/24. Patient does not answer phone. Most likely they had tried calling him, but could not get through to him. I just tried calling patient to let him know that referral was already sent. No answer. No option to leave message.

## 2024-02-21 ENCOUNTER — TELEPHONE (OUTPATIENT)
Age: 89
End: 2024-02-21

## 2024-02-21 NOTE — TELEPHONE ENCOUNTER
Rec'd call from Rianna at [facility]. She states  have been trying to reach out to us about PT orders. Informed PT orders were faxed to Tr Eddy on 2/5/24, but I not been able to reach patient after several attempts . She asked if I could fax the orders to them, Isabella, [facility] is there and does their PT. She also states Mr. Johns does not answer his phone much, kathleen. If he does not recognize the number. She gave me an alternate number which is the  at the [facility] and states she has access to Mr. Johns everyday. She could leave him messages or even get him for a phone call. That number is [phone].     Orders faxed as requested. Confirmed.

## 2024-03-15 ENCOUNTER — TELEPHONE (OUTPATIENT)
Age: 89
End: 2024-03-15

## 2024-03-15 NOTE — TELEPHONE ENCOUNTER
Pt is calling about his PT he went today and they  told him  he needs a new referral for PT [facility] and PT [facility] there

## 2024-03-21 NOTE — TELEPHONE ENCOUNTER
Returned call to the [facility] patient lives at. Spoke to Rianna. Inquired about  whether patient needs PT/OT referral since I sent one last month. Transferred call to DIANNA Mason she states they need a new referral. New referral faxed to [phone]. Confirmed.

## 2024-04-11 ENCOUNTER — CLINICAL DOCUMENTATION (OUTPATIENT)
Age: 89
End: 2024-04-11

## 2024-04-16 ENCOUNTER — CLINICAL DOCUMENTATION (OUTPATIENT)
Age: 89
End: 2024-04-16

## 2024-04-16 NOTE — PROGRESS NOTES
[facility] PT POC was signed & faxed back to [phone],ok,Copy placed in scan folder to be scanned to chart.

## 2024-06-06 NOTE — TELEPHONE ENCOUNTER
Officer Gee Manriquez called in and wanted to let you know pt was found , and will be getting death certificate to you. Will give info to Meredith to close chart, thanks.

## 2024-06-18 ENCOUNTER — TELEPHONE (OUTPATIENT)
Age: 89
End: 2024-06-18

## 2024-06-18 NOTE — TELEPHONE ENCOUNTER
Ron/[place] would like to speak with nurse about closing out PT Order since patient has passed away. Fax: [phone]. Attn: Rachel Velasquez: [phone]

## 2024-06-18 NOTE — TELEPHONE ENCOUNTER
Returned call and spoke with [facility] (Rachel).  Rachel states that:  paperwork (PT Re certification) was faxed over last week   for PT re certification prior to patient expiration.

## 2024-06-27 ENCOUNTER — CLINICAL DOCUMENTATION (OUTPATIENT)
Age: 89
End: 2024-06-27

## 2024-06-27 NOTE — PROGRESS NOTES
Spoke with Rachel Werner of [facility]- advised form for dates 24 - 24 - would be signed  & faxed to [phone]. Form dates 24 - 24, would need to have a corrected date due to pt was  prior to 24, she stated she would see what she could do.